# Patient Record
Sex: FEMALE | Race: BLACK OR AFRICAN AMERICAN | Employment: OTHER | URBAN - METROPOLITAN AREA
[De-identification: names, ages, dates, MRNs, and addresses within clinical notes are randomized per-mention and may not be internally consistent; named-entity substitution may affect disease eponyms.]

---

## 2017-03-08 ENCOUNTER — OFFICE VISIT (OUTPATIENT)
Dept: ENDOCRINOLOGY | Age: 67
End: 2017-03-08

## 2017-03-08 VITALS
HEART RATE: 58 BPM | BODY MASS INDEX: 33.46 KG/M2 | SYSTOLIC BLOOD PRESSURE: 135 MMHG | DIASTOLIC BLOOD PRESSURE: 74 MMHG | WEIGHT: 196 LBS | HEIGHT: 64 IN

## 2017-03-08 DIAGNOSIS — E83.52 HYPERCALCEMIA: ICD-10-CM

## 2017-03-08 DIAGNOSIS — E21.0 HYPERPARATHYROIDISM, PRIMARY (HCC): Primary | ICD-10-CM

## 2017-03-08 RX ORDER — CLONIDINE HYDROCHLORIDE 0.1 MG/1
TABLET ORAL 2 TIMES DAILY
COMMUNITY
End: 2022-02-04 | Stop reason: SDUPTHER

## 2017-03-08 RX ORDER — GUAIFENESIN 100 MG/5ML
81 LIQUID (ML) ORAL EVERY OTHER DAY
COMMUNITY

## 2017-03-08 RX ORDER — ENALAPRIL MALEATE 20 MG/1
20 TABLET ORAL DAILY
COMMUNITY
End: 2021-09-16 | Stop reason: SINTOL

## 2017-03-08 RX ORDER — GLIPIZIDE 5 MG/1
TABLET ORAL 2 TIMES DAILY
COMMUNITY
End: 2017-05-22

## 2017-03-08 RX ORDER — AMLODIPINE BESYLATE 10 MG/1
TABLET ORAL DAILY
COMMUNITY
End: 2022-03-30 | Stop reason: SDUPTHER

## 2017-03-08 RX ORDER — CHOLECALCIFEROL TAB 125 MCG (5000 UNIT) 125 MCG
TAB ORAL DAILY
COMMUNITY
End: 2021-02-15 | Stop reason: CLARIF

## 2017-03-08 NOTE — MR AVS SNAPSHOT
Visit Information Date & Time Provider Department Dept. Phone Encounter #  
 3/8/2017 11:10 AM Sara Ramos, [address] [place] [address] Diabetes and Endocrinology [phone] [id number] Follow-up Instructions Return in about 2 months (around 5/8/2017). Upcoming Health Maintenance Date Due Hepatitis C Screening 1950 DTaP/Tdap/Td series (1 - Tdap) 4/6/1971 BREAST CANCER SCRN MAMMOGRAM 4/6/2000 FOBT Q 1 YEAR AGE 50-75 4/6/2000 ZOSTER VACCINE AGE 60> 4/6/2010 GLAUCOMA SCREENING Q2Y 4/6/2015 OSTEOPOROSIS SCREENING (DEXA) 4/6/2015 Pneumococcal 65+ High/Highest Risk (1 of 2 - PCV13) 4/6/2015 MEDICARE YEARLY EXAM 4/6/2015 INFLUENZA AGE 9 TO ADULT 8/1/2016 Allergies as of 3/8/2017  Review Complete On: 3/8/2017 By: Sara Ramos MD  
 No Known Allergies Current Immunizations  Never Reviewed No immunizations on file. Not reviewed this visit You Were Diagnosed With   
  
 Codes Comments [facility] ([facility]    -  Primary ICD-10-CM: E21.0 ICD-9-CM: 252.01 Hypercalcemia     ICD-10-CM: R92.44 
ICD-9-CM: 275.42 Vitals BP Pulse Height(growth percentile) Weight(growth percentile) BMI Smoking Status 135/74 (BP 1 Location: Right arm, BP Patient Position: Sitting) (!) 58 5' 4\" (1.626 m) 196 lb (88.9 kg) 33.64 kg/m2 Never Smoker Vitals History BMI and BSA Data Body Mass Index Body Surface Area  
 33.64 kg/m 2 2 m 2 Preferred Pharmacy Pharmacy Name Phone [facility], [address] [facility] [phone] Your Updated Medication List  
  
   
This list is accurate as of: 3/8/17 12:02 PM.  Always use your most recent med list. amLODIPine 10 mg tablet Commonly known as:  Carmelita Gables Take  by mouth daily. aspirin 81 mg chewable tablet Take 81 mg by mouth daily. cholecalciferol (VITAMIN D3) 5,000 unit Tab tablet Commonly known as:  VITAMIN D3 Take  by mouth daily. cloNIDine HCl 0.1 mg tablet Commonly known as:  CATAPRES Take  by mouth two (2) times a day. enalapril 20 mg tablet Commonly known as:  Ricky Nissen Take 20 mg by mouth daily. glipiZIDE 5 mg tablet Commonly known as:  Janith Ket Take  by mouth two (2) times a day. metFORMIN 500 mg tablet Commonly known as:  GLUCOPHAGE Take 1,000 mg by mouth two (2) times daily (with meals). metoprolol tartrate 100 mg IR tablet Commonly known as:  LOPRESSOR Take  by mouth two (2) times a day. pravastatin 40 mg tablet Commonly known as:  PRAVACHOL Take 40 mg by mouth daily. We Performed the Following CALCIUM, RANDOM URINE [[id number] Custom] CREATININE, UR, RANDOM Q453814 CPT(R)] METABOLIC PANEL, COMPREHENSIVE [00374 CPT(R)] PTH INTACT [65911 CPT(R)] VITAMIN D, 25 HYDROXY [id number] CPT(R)] Follow-up Instructions Return in about 2 months (around 5/8/2017). To-Do List   
 03/08/2017 Imaging:  DEXA BONE DENSITY STUDY AXIAL Patient Instructions 24 HOUR URINE COLLECTION As part of your evaluation, we would like for you to collect a 24 hour sample of urine. This means that you will collect all of your urine into a collection jug for 24 hours. The first step is to visit the laboratory to receive a collection jug. Then select a day to start the urine collection. Keep in mind when selecting a day that you will need to return to the laboratory the following day to submit your urine sample. On the morning of the day you start collecting urine, the very first urine void should go into the toilet however everything else collected afterward should be collected into the jug, including any urine overnight, AND the first urine the following morning. After this you can stop collecting and bring the sample to the laboratory.  We will discuss the results with you along with any other labs you have completed. If you have other blood work to perform, you can complete that when you submit the urine sample to the lab. Just in case your lab tests may require it, return to the laboratory in the morning fasting. Plan to perform blood work on the morning that you return the urine container to the lab. Call with any concerns, I will discuss the results with you by phone, Yossi Baker [address] [facility] Diabetes & Endocrinology [address] [facility] Introducing [facility] SERVICES! Macario Veras introduces [employer] patient portal. Now you can access parts of your medical record, email your doctor's office, and request medication refills online. 1. In your internet browser, go to [email or web address] 2. Click on the First Time User? Click Here link in the Sign In box. You will see the New Member Sign Up page. 3. Enter your [employer] Access Code exactly as it appears below. You will not need to use this code after youve completed the sign-up process. If you do not sign up before the expiration date, you must request a new code. · [employer] Access Code: [email or web address] Expires: 6/6/2017 11:57 AM 
 
4. Enter the last four digits of your Social Security Number (xxxx) and Date of Birth (mm/dd/yyyy) as indicated and click Submit. You will be taken to the next sign-up page. 5. Create a [employer] ID. This will be your [employer] login ID and cannot be changed, so think of one that is secure and easy to remember. 6. Create a [employer] password. You can change your password at any time. 7. Enter your Password Reset Question and Answer. This can be used at a later time if you forget your password. 8. Enter your e-mail address. You will receive e-mail notification when new information is available in [address] 9. Click Sign Up. You can now view and download portions of your medical record.  
10. Click the Download Summary menu link to download a portable copy of your medical information. If you have questions, please visit the Frequently Asked Questions section of the [employer] website. Remember, [employer] is NOT to be used for urgent needs. For medical emergencies, dial 911. Now available from your iPhone and Android! Please provide this summary of care documentation to your next provider. Your primary care clinician is listed as Raina Miller. If you have any questions after today's visit, please call [phone].

## 2017-03-08 NOTE — PROGRESS NOTES
CONSULTATION REQUESTED BY: Mary Ellen Felder MD     REASON FOR CONSULT: hypercalcemia    CHIEF COMPLAINT: hypercalcemia    HISTORY OF PRESENT ILLNESS:   Nicholas Smart is a 77 y.o. female with a PMHx as noted below who was referred to our endocrinology clinic for evaluation of hypercalcemia. Patient is noted for evaluation of hypercalcemia with Dr. Ludivina Mckeon back in 2012. She was noted for very low vitamin D, level of 11.7, and a PTH level of 39. Furthermore she had been on HCTZ which had been stopped. Her serum calcium level was always very mild at that time, around 10.3-10.4. No history of kidney stones. Family history is not significant for hypercalcemia. Serum calcium on CMP obtained on 1/6/17 per outside labs shows a calcium level of 10.8. Unclear if a 24 hour urine calcium was previously obtained. No recent PTH level for review. Patient notes she has been feeling well in the past year, though some fatigue on exertion. She has been taking 5000 units of vitamin D 3 daily. PAST MEDICAL/SURGICAL HISTORY:   Past Medical History:   Diagnosis Date    Diabetes ([address]     HTN (hypertension)     Hyperlipemia      Past Surgical History:   Procedure Laterality Date    HX CHOLECYSTECTOMY         ALLERGIES:   No Known Allergies    MEDICATIONS ON ADMISSION:     Current Outpatient Prescriptions:     ergocalciferol (ERGOCALCIFEROL) 50,000 unit capsule, Take 1 Cap by mouth every fourteen (14) days. , Disp: 6 Cap, Rfl: 3    pravastatin (PRAVACHOL) 40 mg tablet, Take 40 mg by mouth daily. , Disp: , Rfl:     amLODIPine-benazepril (LOTREL) 5-10 mg per capsule, Take 1 Cap by mouth daily. , Disp: , Rfl:     metoprolol (LOPRESSOR) 100 mg tablet, Take  by mouth two (2) times a day., Disp: , Rfl:     metformin (GLUCOPHAGE) 500 mg tablet, Take 1,000 mg by mouth two (2) times daily (with meals). , Disp: , Rfl:     SOCIAL HISTORY:   Social History     Social History    Marital status:      Spouse name: N/A   Vinicio Wallace Number of children: N/A    Years of education: N/A     Occupational History    Not on file. Social History Main Topics    Smoking status: Never Smoker    Smokeless tobacco: Not on file    Alcohol use No    Drug use: No    Sexual activity: Not on file     Other Topics Concern    Not on file     Social History Narrative       FAMILY HISTORY:  Family History   Problem Relation Age of Onset    Diabetes Father     Kidney Disease Father     Diabetes Brother     Stroke Mother     Kidney Disease Brother     Other Brother      kidney stones       REVIEW OF SYSTEMS: Complete ROS assessed and noted for that which is described above, all else are negative. Eyes: normal  ENT: normal  CVS: normal  Resp: normal  GI: normal  : normal  GYN: normal  Endocrine: normal  Integument: normal  Musculoskeletal: normal  Neuro: normal  Psych: normal      PHYSICAL EXAMINATION:    VITAL SIGNS:  Visit Vitals    /76 (BP 1 Location: Left arm, BP Patient Position: Sitting)    Pulse 62    Ht 5' 4\" (1.626 m)    Wt 196 lb (88.9 kg)    BMI 33.64 kg/m2       GENERAL: NCAT, Sitting comfortably, NAD  EYES: EOMI, non-icteric, no proptosis  Ear/Nose/Throat: NCAT, no inflammation, no masses  LYMPH NODES: No LAD  CARDIOVASCULAR: S1 S2, RRR, No murmur, 2+ radial pulses  RESPIRATORY: CTA b/l, no wheeze/rales  GASTROINTESTINAL: NT, ND,  MUSCULOSKELETAL: Normal ROM, no atrophy  SKIN: warm, no edema/rash/ or other skin changes  NEUROLOGIC: 5/5 power all extremities, no tremors, AAOx3  PSYCHIATRIC: Normal affect, Normal insight and judgement         REVIEW OF LABORATORY AND RADIOLOGY DATA:   Labs and documentation have been reviewed as described above. ASSESSMENT AND PLAN:   Nichole Cowart is a 77 y.o. female with a PMHx as noted above who was referred to our endocrinology clinic for evaluation of hypercalcemia. Hypercalcemia, previously thought to be due to primary hyperparathyroidism.      Today we discussed the importance of f/u evaluation of bone health with progressive hypercalcemia. We discussed the need to reduce fracture risk and evaluate bone density. Furthermore we discussed the need for a lab refresh, with new baseline labs to guide decision making and therapeutic options. Plan:   Continue 5000 units of Vitamin D3 daily  Will check labs (blood and urine) and advise of potential calcium supplementation  Check PTH, CMP, 25 OH D, 24 hour urine calcium/creatiine  Denies prior DXA scan, will request a bone density eval.  * Patient would like to perform labs and imaging near home, I provided her with order slip to take with her. Discussed very simple and brief leg strengthening exercises to reduce risk of falls. Discussed screening her home for rugs and other obstructions that increase risk of falls. We will consider pursuing imaging of parathyroid glands only if she meets criteria for surgical intervention. To call with any concerns, I will advise her of results and treatment plan. Plan for 2 month f/u visit    Khloe HALL  [address] [facility] Diabetes & Endocrinology

## 2017-03-08 NOTE — PATIENT INSTRUCTIONS
24 HOUR URINE COLLECTION  As part of your evaluation, we would like for you to collect a 24 hour sample of urine. This means that you will collect all of your urine into a collection jug for 24 hours. The first step is to visit the laboratory to receive a collection jug. Then select a day to start the urine collection. Keep in mind when selecting a day that you will need to return to the laboratory the following day to submit your urine sample. On the morning of the day you start collecting urine, the very first urine void should go into the toilet however everything else collected afterward should be collected into the jug, including any urine overnight, AND the first urine the following morning. After this you can stop collecting and bring the sample to the laboratory. We will discuss the results with you along with any other labs you have completed. If you have other blood work to perform, you can complete that when you submit the urine sample to the lab. Just in case your lab tests may require it, return to the laboratory in the morning fasting. Plan to perform blood work on the morning that you return the urine container to the lab. Call with any concerns, I will discuss the results with you by phone,    Andrew Tran.  [address] Endocrinology  [address] [place] [address]

## 2017-03-15 ENCOUNTER — TELEPHONE (OUTPATIENT)
Dept: ENDOCRINOLOGY | Age: 67
End: 2017-03-15

## 2017-03-15 LAB
25(OH)D3+25(OH)D2 SERPL-MCNC: 32.9 NG/ML (ref 30–100)
ALBUMIN SERPL-MCNC: 3.9 G/DL (ref 3.6–4.8)
ALBUMIN/GLOB SERPL: 1.5 {RATIO} (ref 1.2–2.2)
ALP SERPL-CCNC: 68 IU/L (ref 39–117)
ALT SERPL-CCNC: 6 IU/L (ref 0–32)
AST SERPL-CCNC: 12 IU/L (ref 0–40)
BILIRUB SERPL-MCNC: 0.2 MG/DL (ref 0–1.2)
BUN SERPL-MCNC: 10 MG/DL (ref 8–27)
BUN/CREAT SERPL: 16 (ref 11–26)
CALCIUM 24H UR-MCNC: 14.4 MG/DL
CALCIUM 24H UR-MRATE: 144 MG/24 HR (ref 100–300)
CALCIUM SERPL-MCNC: 10.6 MG/DL (ref 8.7–10.3)
CHLORIDE SERPL-SCNC: 105 MMOL/L (ref 96–106)
CO2 SERPL-SCNC: 28 MMOL/L (ref 18–29)
CREAT 24H UR-MRATE: 687 MG/24 HR (ref 800–1800)
CREAT SERPL-MCNC: 0.64 MG/DL (ref 0.57–1)
CREAT UR-MCNC: 68.7 MG/DL
GLOBULIN SER CALC-MCNC: 2.6 G/DL (ref 1.5–4.5)
GLUCOSE SERPL-MCNC: 88 MG/DL (ref 65–99)
POTASSIUM SERPL-SCNC: 3.9 MMOL/L (ref 3.5–5.2)
PROT SERPL-MCNC: 6.5 G/DL (ref 6–8.5)
PTH-INTACT SERPL-MCNC: 30 PG/ML (ref 15–65)
SODIUM SERPL-SCNC: 146 MMOL/L (ref 134–144)

## 2017-03-15 NOTE — TELEPHONE ENCOUNTER
----- Message from Paty Vasquez MD sent at 3/15/2017  2:01 PM EDT -----  Labs and Bone density scan were reviewed:  Bone Density Scan dated 3/14/17 ([facility]):     Femor Neck T=  -1.1   Lumbar Spine T=  -0.2      Indication was for hyperparathyroidism but forarm T-score does not appear to have been performed. Serum calcium is mildly elevated at 10.6 (8.7-10. 3)  Vitamin D level is normal at 32.9  GFR normal >90  PTH 30, not overwhelmingly high but not suppressed either  24 hour urine calcium is normal at 144mg    Recommend she continue on her vitamin D3 of 5000 units  Recommend she start 500mg of calcium carbonate with breakfast and dinner  Because it is very mild and without symptoms, just mild borderline osteopenia, we can monitor on this therapy for now. Celine Rendon. 39 [employer] Endocrinology  [employer]    -----------------------------------  3/15/2017 2:12pm    I attempted to call Carrington Bragg, no answer and no voice mail on home phone. I then called mobile number and reached Carrington Bragg but she couldn't talk and asked me to call her back on her home number around 4pm. I told her i would do that for her. Jose Valencia   ----------------------------------------------    Addendum: 3/15/2017, 4:54 PM    Spoke with Amanda Mancuso by phone and relayed the above message. I told her and also her  Porter Tobin. They will  the Calcium and get started as instructed.     Florentino Romero

## 2017-03-15 NOTE — PROGRESS NOTES
Labs and Bone density scan were reviewed:  Bone Density Scan dated 3/14/17 ([facility]):     Femor Neck T=  -1.1   Lumbar Spine T=  -0.2      Indication was for hyperparathyroidism but forarm T-score does not appear to have been performed. Serum calcium is mildly elevated at 10.6 (8.7-10. 3)  Vitamin D level is normal at 32.9  GFR normal >90  PTH 30, not overwhelmingly high but not suppressed either  24 hour urine calcium is normal at 144mg    Recommend she continue on her vitamin D3 of 5000 units  Recommend she start 500mg of calcium carbonate with breakfast and dinner  Because it is very mild and without symptoms, just mild borderline osteopenia, we can monitor on this therapy for now. Maren Medrano.  [address] [facility] Endocrinology  [address] [place] [address]

## 2017-05-22 ENCOUNTER — OFFICE VISIT (OUTPATIENT)
Dept: ENDOCRINOLOGY | Age: 67
End: 2017-05-22

## 2017-05-22 VITALS
HEART RATE: 62 BPM | WEIGHT: 192.6 LBS | HEIGHT: 64 IN | DIASTOLIC BLOOD PRESSURE: 69 MMHG | BODY MASS INDEX: 32.88 KG/M2 | SYSTOLIC BLOOD PRESSURE: 120 MMHG

## 2017-05-22 DIAGNOSIS — E83.52 HYPERCALCEMIA: Primary | ICD-10-CM

## 2017-05-22 DIAGNOSIS — E21.0 HYPERPARATHYROIDISM, PRIMARY (HCC): ICD-10-CM

## 2017-05-22 RX ORDER — METFORMIN HYDROCHLORIDE 1000 MG/1
TABLET ORAL 2 TIMES DAILY
COMMUNITY
Start: 2017-03-29 | End: 2021-10-27 | Stop reason: SDUPTHER

## 2017-05-22 RX ORDER — GLIPIZIDE 5 MG/1
TABLET, FILM COATED, EXTENDED RELEASE ORAL DAILY
COMMUNITY
Start: 2017-04-13 | End: 2022-02-04 | Stop reason: SDUPTHER

## 2017-05-22 RX ORDER — LATANOPROST 50 UG/ML
1 SOLUTION/ DROPS OPHTHALMIC
COMMUNITY
Start: 2017-03-29

## 2017-05-22 NOTE — MR AVS SNAPSHOT
Visit Information Date & Time Provider Department Dept. Phone Encounter #  
 5/22/2017 10:50 AM Flora Whitehead, [facility] Diabetes and Endocrinology [id number] Follow-up Instructions Return in about 6 months (around 11/22/2017). Upcoming Health Maintenance Date Due Hepatitis C Screening 1950 DTaP/Tdap/Td series (1 - Tdap) 4/6/1971 BREAST CANCER SCRN MAMMOGRAM 4/6/2000 FOBT Q 1 YEAR AGE 50-75 4/6/2000 ZOSTER VACCINE AGE 60> 4/6/2010 GLAUCOMA SCREENING Q2Y 4/6/2015 Pneumococcal 65+ Low/Medium Risk (1 of 2 - PCV13) 4/6/2015 MEDICARE YEARLY EXAM 4/6/2015 INFLUENZA AGE 9 TO ADULT 8/1/2017 Allergies as of 5/22/2017  Review Complete On: 5/22/2017 By: Jorgito Kennedy LPN No Known Allergies Current Immunizations  Never Reviewed No immunizations on file. Not reviewed this visit You Were Diagnosed With   
  
 Codes Comments Hypercalcemia    -  Primary ICD-10-CM: E35.37 
ICD-9-CM: 275.42 Hyperparathyroidism, primary ([address]     ICD-10-CM: E21.0 ICD-9-CM: 252.01 Vitals BP Pulse Height(growth percentile) Weight(growth percentile) BMI Smoking Status 120/69 62 5' 4\" (1.626 m) 192 lb 9.6 oz (87.4 kg) 33.06 kg/m2 Never Smoker Vitals History BMI and BSA Data Body Mass Index Body Surface Area 33.06 kg/m 2 1.99 m 2 Preferred Pharmacy Pharmacy Name Phone [facility] 2002 [facility], [address] [place] [address] [phone] Your Updated Medication List  
  
   
This list is accurate as of: 5/22/17 10:54 AM.  Always use your most recent med list. amLODIPine 10 mg tablet Commonly known as:  Yarely Half Take  by mouth daily. aspirin 81 mg chewable tablet Take 81 mg by mouth daily. cholecalciferol (VITAMIN D3) 5,000 unit Tab tablet Commonly known as:  VITAMIN D3 Take  by mouth daily. cloNIDine HCl 0.1 mg tablet Commonly known as:  CATAPRES Take  by mouth two (2) times a day. enalapril 20 mg tablet Commonly known as:  Enrike Grzegorz Take 20 mg by mouth daily. * glipiZIDE 5 mg tablet Commonly known as:  Dimple Coral Take  by mouth two (2) times a day. * glipiZIDE SR 5 mg CR tablet Commonly known as:  GLUCOTROL XL  
daily. latanoprost 0.005 % ophthalmic solution Commonly known as:  XALATAN  
  
 * metFORMIN 500 mg tablet Commonly known as:  GLUCOPHAGE Take 1,000 mg by mouth two (2) times daily (with meals). * metFORMIN 1,000 mg tablet Commonly known as:  GLUCOPHAGE  
two (2) times a day. metoprolol tartrate 100 mg IR tablet Commonly known as:  LOPRESSOR Take  by mouth two (2) times a day. OTHER  
daily. Calcium citrate + D ( Vit D3 500 units and Calcium 630  
  
 pravastatin 40 mg tablet Commonly known as:  PRAVACHOL Take 40 mg by mouth daily. * Notice: This list has 4 medication(s) that are the same as other medications prescribed for you. Read the directions carefully, and ask your doctor or other care provider to review them with you. We Performed the Following METABOLIC PANEL, COMPREHENSIVE [26737 CPT(R)] PTH INTACT [47150 CPT(R)] Follow-up Instructions Return in about 6 months (around 11/22/2017). Patient Instructions Continue with calcium and vitamin D daily Blood work today, Will discuss any changes by phone, Sergio Moody. [address] [facility] Diabetes & Endocrinology 508 [facility] SERVICES! [facility] introduces [employer] patient portal. Now you can access parts of your medical record, email your doctor's office, and request medication refills online. 1. In your internet browser, go to [email or web address] 2. Click on the First Time User? Click Here link in the Sign In box. You will see the New Member Sign Up page. 3. Enter your [employer] Access Code exactly as it appears below. You will not need to use this code after youve completed the sign-up process. If you do not sign up before the expiration date, you must request a new code. · [employer] Access Code: [email or web address] Expires: 6/6/2017 12:57 PM 
 
4. Enter the last four digits of your Social Security Number (xxxx) and Date of Birth (mm/dd/yyyy) as indicated and click Submit. You will be taken to the next sign-up page. 5. Create a [employer] ID. This will be your [employer] login ID and cannot be changed, so think of one that is secure and easy to remember. 6. Create a [employer] password. You can change your password at any time. 7. Enter your Password Reset Question and Answer. This can be used at a later time if you forget your password. 8. Enter your e-mail address. You will receive e-mail notification when new information is available in [address] 9. Click Sign Up. You can now view and download portions of your medical record. 10. Click the Download Summary menu link to download a portable copy of your medical information. If you have questions, please visit the Frequently Asked Questions section of the [employer] website. Remember, [employer] is NOT to be used for urgent needs. For medical emergencies, dial 911. Now available from your iPhone and Android! Please provide this summary of care documentation to your next provider. Your primary care clinician is listed as Shane Campoverde. If you have any questions after today's visit, please call [phone].

## 2017-05-22 NOTE — PROGRESS NOTES
CHIEF COMPLAINT: primary hyperparathyroidism, asymptomatic     HISTORY OF PRESENT ILLNESS:   Bam Magaña is a 79 y.o. female with a PMHx as noted below who presents for f/u evaluation of hypercalcemia. Initial History:  Patient is noted for evaluation of hypercalcemia with Dr. Milagros Padron back in 2012. She was noted for very low vitamin D, level of 11.7, and a PTH level of 39. Furthermore she had been on HCTZ which had been stopped. Her serum calcium level was always very mild at that time, around 10.3-10.4. No history of kidney stones. Family history is not significant for hypercalcemia. Interval History:  Patient had completed a DXA with T= - 1.1 at the FN, forarm not completed  Labs obtained revealed serum calcium 10.6, D 32.9, PTH 30, GFR >90, 24 hr urine calcium 144  She was continued on D3 5000 U daily and started on calcium carb 500mg BID (taking one calc citrate 650 daily)  We planned to proceed with conservative medical management considering mild asymptomatic status. She is otherwise feeling well, has no complaints today. She has not had any fractures in the past.       PAST MEDICAL/SURGICAL HISTORY:   Past Medical History:   Diagnosis Date    Diabetes ([facility] [address]     HTN (hypertension)     Hyperlipemia      Past Surgical History:   Procedure Laterality Date    HX CHOLECYSTECTOMY         ALLERGIES:   No Known Allergies    MEDICATIONS ON ADMISSION:     Current Outpatient Prescriptions:     glipiZIDE SR (GLUCOTROL XL) 5 mg CR tablet, daily. , Disp: , Rfl:     metFORMIN (GLUCOPHAGE) 1,000 mg tablet, two (2) times a day., Disp: , Rfl:     latanoprost (XALATAN) 0.005 % ophthalmic solution, , Disp: , Rfl:     OTHER, daily. Calcium citrate + D ( Vit D3 500 units and Calcium 630, Disp: , Rfl:     aspirin 81 mg chewable tablet, Take 81 mg by mouth daily. , Disp: , Rfl:     enalapril (VASOTEC) 20 mg tablet, Take 20 mg by mouth daily. , Disp: , Rfl:     amLODIPine (NORVASC) 10 mg tablet, Take  by mouth daily. , Disp: , Rfl:     cholecalciferol, VITAMIN D3, (VITAMIN D3) 5,000 unit tab tablet, Take  by mouth daily. , Disp: , Rfl:     cloNIDine HCl (CATAPRES) 0.1 mg tablet, Take  by mouth two (2) times a day., Disp: , Rfl:     pravastatin (PRAVACHOL) 40 mg tablet, Take 40 mg by mouth daily. , Disp: , Rfl:     metoprolol (LOPRESSOR) 100 mg tablet, Take  by mouth two (2) times a day., Disp: , Rfl:     glipiZIDE (GLUCOTROL) 5 mg tablet, Take  by mouth two (2) times a day., Disp: , Rfl:     metformin (GLUCOPHAGE) 500 mg tablet, Take 1,000 mg by mouth two (2) times daily (with meals). , Disp: , Rfl:     SOCIAL HISTORY:   Social History     Social History    Marital status:      Spouse name: N/A    Number of children: N/A    Years of education: N/A     Occupational History    Not on file. Social History Main Topics    Smoking status: Never Smoker    Smokeless tobacco: Not on file    Alcohol use No    Drug use: No    Sexual activity: Not on file     Other Topics Concern    Not on file     Social History Narrative       FAMILY HISTORY:  Family History   Problem Relation Age of Onset    Diabetes Father     Kidney Disease Father     Diabetes Brother     Stroke Mother     Kidney Disease Brother     Other Brother      kidney stones       REVIEW OF SYSTEMS: Complete ROS assessed and noted for that which is described above, all else are negative.   Eyes: normal  ENT: normal  CVS: normal  Resp: normal  GI: normal  : normal  GYN: normal  Endocrine: normal  Integument: normal  Musculoskeletal: normal  Neuro: normal  Psych: normal      PHYSICAL EXAMINATION:    VITAL SIGNS:  Visit Vitals    /69    Pulse 62    Ht 5' 4\" (1.626 m)    Wt 192 lb 9.6 oz (87.4 kg)    BMI 33.06 kg/m2       GENERAL: NCAT, Sitting comfortably, NAD  EYES: EOMI, non-icteric, no proptosis  Ear/Nose/Throat: NCAT, no inflammation, no masses  LYMPH NODES: No LAD  CARDIOVASCULAR: S1 S2, RRR, No murmur, 2+ radial pulses  RESPIRATORY: CTA b/l, no wheeze/rales  GASTROINTESTINAL: NT, ND,  MUSCULOSKELETAL: Normal ROM, no atrophy  SKIN: warm, no edema/rash/ or other skin changes  NEUROLOGIC: 5/5 power all extremities, no tremors, AAOx3  PSYCHIATRIC: Normal affect, Normal insight and judgement         REVIEW OF LABORATORY AND RADIOLOGY DATA:   Labs and documentation have been reviewed as described above. ASSESSMENT AND PLAN:   Yuniel Acosta is a 79 y.o. female with a PMHx as noted above who presents for f/u evaluation of hypercalcemia. Primary hyperparathyroidism, mild asymptomatic    Conservative management at this time with calcium and vitamin D are appropriate. Serum calcium has always been mild, she is asymptomatic, renal function is normal, DXA with only borderline osteopenia at T= -1.1, and urine calcium is stable. Plan:   Continue 5000 units of Vitamin D3 daily  Calcium citrate 650mg daily with a meal ok  PTH and CMP today,  Hydration is important, discussed. Plan for 6 month f/u visit    Roderick HALL  [address] [facility] Diabetes & Endocrinology

## 2017-05-22 NOTE — PATIENT INSTRUCTIONS
Continue with calcium and vitamin D daily  Blood work today,  Will discuss any changes by phone,    Raymond Valenzuela.  39 [facility] Endocrinology  8 [facility]

## 2017-05-23 LAB
ALBUMIN SERPL-MCNC: 4 G/DL (ref 3.6–4.8)
ALBUMIN/GLOB SERPL: 1.7 {RATIO} (ref 1.2–2.2)
ALP SERPL-CCNC: 62 IU/L (ref 39–117)
ALT SERPL-CCNC: 12 IU/L (ref 0–32)
AST SERPL-CCNC: 18 IU/L (ref 0–40)
BILIRUB SERPL-MCNC: <0.2 MG/DL (ref 0–1.2)
BUN SERPL-MCNC: 16 MG/DL (ref 8–27)
BUN/CREAT SERPL: 22 (ref 12–28)
CALCIUM SERPL-MCNC: 11 MG/DL (ref 8.7–10.3)
CHLORIDE SERPL-SCNC: 106 MMOL/L (ref 96–106)
CO2 SERPL-SCNC: 27 MMOL/L (ref 18–29)
CREAT SERPL-MCNC: 0.74 MG/DL (ref 0.57–1)
GLOBULIN SER CALC-MCNC: 2.4 G/DL (ref 1.5–4.5)
GLUCOSE SERPL-MCNC: 110 MG/DL (ref 65–99)
POTASSIUM SERPL-SCNC: 4.2 MMOL/L (ref 3.5–5.2)
PROT SERPL-MCNC: 6.4 G/DL (ref 6–8.5)
PTH-INTACT SERPL-MCNC: 29 PG/ML (ref 15–65)
SODIUM SERPL-SCNC: 148 MMOL/L (ref 134–144)

## 2017-05-24 ENCOUNTER — TELEPHONE (OUTPATIENT)
Dept: ENDOCRINOLOGY | Age: 67
End: 2017-05-24

## 2017-05-24 NOTE — PROGRESS NOTES
Patients blood work is stable,   Because her urine calcium was stable, and vitamin D level was improved, along with stable bone density,   She can hold off on the calcium citrate that she is taking currently, we can decide later if she needs to take anymore extra calcium,  (PTH 29, Serum Calcium 11)    Thanks,  Darnell HALL  39 [facility] Endocrinology  [address] [place] [address]

## 2017-05-24 NOTE — TELEPHONE ENCOUNTER
----- Message from Monalisa Winston MD sent at 5/24/2017 10:27 AM EDT -----  Patients blood work is stable,   Because her urine calcium was stable, and vitamin D level was improved, along with stable bone density,   She can hold off on the calcium citrate that she is taking currently, we can decide later if she needs to take anymore extra calcium,  (PTH 29, Serum Calcium 11)    Thanks,  Fortunato Barrientos TCherry [address] [place] [address] Endocrinology  [address] [place] [address]    ------------------------------------------------------------------------    Addendum: 5/24/2017, 12:39 PM    Spoke with Taylor Cousins by phone, and relayed the above message. She and her  both understood the information.       Moshe Oneal

## 2017-08-24 ENCOUNTER — TELEPHONE (OUTPATIENT)
Dept: ENDOCRINOLOGY | Age: 67
End: 2017-08-24

## 2017-08-24 DIAGNOSIS — E83.52 HYPERCALCEMIA: Primary | ICD-10-CM

## 2017-08-24 NOTE — TELEPHONE ENCOUNTER
Received outside serum calcium level of 10.9 which is only 0.7mg/dl above the normal range. Patient to stay hydrated. It would be a good idea at this time to check just a few more labs to make sure her calcium is not elevated from other causes while we are monitoring. I will order those labs now and she can have them completed at her convenience at [facility]. If she likes, we can print the requisition form (making sure all labs are included on that printed copy) and mail/fax to her or her local lab to have her repeat them closer to home. We can update her on those result at that time. In the meantime proper hydration is important. Thanks,    Marilin Molina.  39 [facility] Endocrinology  [address] [place] [address]

## 2017-08-24 NOTE — TELEPHONE ENCOUNTER
I attempted to call Nathan Bynum but was told she was not home. I left a message saying I would try her again tomorrow.   Lianne Hedrick

## 2017-08-25 NOTE — TELEPHONE ENCOUNTER
I called Yossi Read back and relayed Dr. Sammi Cam message. She understood the information and will have that lab work done.   Kayla Vázquez

## 2017-09-01 LAB
1,25(OH)2D3 SERPL-MCNC: 45.9 PG/ML (ref 19.9–79.3)
25(OH)D3+25(OH)D2 SERPL-MCNC: 67.1 NG/ML (ref 30–100)
PTH RELATED PROT SERPL-SCNC: <1.1 PMOL/L
TSH SERPL DL<=0.005 MIU/L-ACNC: 2.47 UIU/ML (ref 0.45–4.5)
VIT A SERPL-MCNC: 65 UG/DL (ref 26–82)

## 2017-09-05 NOTE — TELEPHONE ENCOUNTER
PTHrp, 25 OH & 1,25 OH D, TSH, and Vit A levels are all normal.   No clear causes for any potential non-PTH mediated hypercalcemia. As calcium level is mild, we will continue to monitor,  I called patient and discussed this with her,    Nancy Ferreira.  39 [facility] Endocrinology  [address] [place] [address]

## 2017-11-22 ENCOUNTER — OFFICE VISIT (OUTPATIENT)
Dept: ENDOCRINOLOGY | Age: 67
End: 2017-11-22

## 2017-11-22 VITALS
SYSTOLIC BLOOD PRESSURE: 169 MMHG | WEIGHT: 195.3 LBS | BODY MASS INDEX: 33.34 KG/M2 | HEART RATE: 60 BPM | HEIGHT: 64 IN | DIASTOLIC BLOOD PRESSURE: 83 MMHG

## 2017-11-22 DIAGNOSIS — E21.0 HYPERPARATHYROIDISM, PRIMARY (HCC): ICD-10-CM

## 2017-11-22 DIAGNOSIS — E83.52 HYPERCALCEMIA: Primary | ICD-10-CM

## 2017-11-22 NOTE — PATIENT INSTRUCTIONS
Blood work at the lab at [facility],    Will see you back in 4 months,    Debra CHANG.  [address] Endocrinology  [address] [place] [address]

## 2017-11-22 NOTE — MR AVS SNAPSHOT
Visit Information Date & Time Provider Department Dept. Phone Encounter #  
 11/22/2017 10:50 AM Ever Fernandez, [address] [place] [address] Diabetes and Endocrinology [phone] [id number] Follow-up Instructions Return in about 4 months (around 3/22/2018). Upcoming Health Maintenance Date Due Hepatitis C Screening 1950 DTaP/Tdap/Td series (1 - Tdap) 4/6/1971 BREAST CANCER SCRN MAMMOGRAM 4/6/2000 FOBT Q 1 YEAR AGE 50-75 4/6/2000 ZOSTER VACCINE AGE 60> 2/6/2010 GLAUCOMA SCREENING Q2Y 4/6/2015 Pneumococcal 65+ Low/Medium Risk (1 of 2 - PCV13) 4/6/2015 MEDICARE YEARLY EXAM 4/6/2015 Influenza Age 5 to Adult 8/1/2017 Allergies as of 11/22/2017  Review Complete On: 11/22/2017 By: Ever Fernandez MD  
 No Known Allergies Current Immunizations  Never Reviewed No immunizations on file. Not reviewed this visit You Were Diagnosed With   
  
 Codes Comments Hypercalcemia    -  Primary ICD-10-CM: O36.35 
ICD-9-CM: 275.42 Hyperparathyroidism, primary ([facility] [address]     ICD-10-CM: E21.0 ICD-9-CM: 252.01 Vitals BP Pulse Height(growth percentile) Weight(growth percentile) BMI Smoking Status 169/83 (BP 1 Location: Right arm, BP Patient Position: Sitting) 60 5' 4\" (1.626 m) 195 lb 4.8 oz (88.6 kg) 33.52 kg/m2 Never Smoker Vitals History BMI and BSA Data Body Mass Index Body Surface Area  
 33.52 kg/m 2 2 m 2 Preferred Pharmacy Pharmacy Name Phone [facility] [address] [facility], [facility] [address] [facility] [phone] Your Updated Medication List  
  
   
This list is accurate as of: 11/22/17 11:58 AM.  Always use your most recent med list. amLODIPine 10 mg tablet Commonly known as:  Julisa Spruce Take  by mouth daily. aspirin 81 mg chewable tablet Take 81 mg by mouth daily. cholecalciferol (VITAMIN D3) 5,000 unit Tab tablet Commonly known as:  VITAMIN D3  
 Take  by mouth daily. cloNIDine HCl 0.1 mg tablet Commonly known as:  CATAPRES Take  by mouth two (2) times a day. enalapril 20 mg tablet Commonly known as:  Esta Honer Take 20 mg by mouth daily. glipiZIDE SR 5 mg CR tablet Commonly known as:  GLUCOTROL XL  
daily. latanoprost 0.005 % ophthalmic solution Commonly known as:  XALATAN  
  
 metFORMIN 1,000 mg tablet Commonly known as:  GLUCOPHAGE  
two (2) times a day. metoprolol tartrate 100 mg IR tablet Commonly known as:  LOPRESSOR Take  by mouth two (2) times a day. OTHER  
daily. Calcium citrate + D ( Vit D3 500 units and Calcium 630  
  
 pravastatin 40 mg tablet Commonly known as:  PRAVACHOL Take 40 mg by mouth daily. We Performed the Following FREE LIGHT CHAINS, KAPPA/LAMBDA, QT [38868 CPT(R)] METABOLIC PANEL, COMPREHENSIVE [94899 CPT(R)] PTH INTACT [89407 CPT(R)] Follow-up Instructions Return in about 4 months (around 3/22/2018). Patient Instructions Blood work at the lab at [facility], Will see you back in 4 months, 
 
Hazel HALL 5[address] [facility] Diabetes & Endocrinology 8 [facility] Introducing [facility]! [employer] introduces [employer] patient portal. Now you can access parts of your medical record, email your doctor's office, and request medication refills online. 1. In your internet browser, go to [email or web address] 2. Click on the First Time User? Click Here link in the Sign In box. You will see the New Member Sign Up page. 3. Enter your [employer] Access Code exactly as it appears below. You will not need to use this code after youve completed the sign-up process. If you do not sign up before the expiration date, you must request a new code. · [employer] Access Code: [email or web address] Expires: 2/20/2018 11:58 AM 
 
4.  Enter the last four digits of your Social Security Number (xxxx) and Date of Birth (mm/dd/yyyy) as indicated and click Submit. You will be taken to the next sign-up page. 5. Create a [employer] ID. This will be your [employer] login ID and cannot be changed, so think of one that is secure and easy to remember. 6. Create a [employer] password. You can change your password at any time. 7. Enter your Password Reset Question and Answer. This can be used at a later time if you forget your password. 8. Enter your e-mail address. You will receive e-mail notification when new information is available in [address] 9. Click Sign Up. You can now view and download portions of your medical record. 10. Click the Download Summary menu link to download a portable copy of your medical information. If you have questions, please visit the Frequently Asked Questions section of the [employer] website. Remember, [employer] is NOT to be used for urgent needs. For medical emergencies, dial 911. Now available from your iPhone and Android! Please provide this summary of care documentation to your next provider. Your primary care clinician is listed as Jaime Carlisle. If you have any questions after today's visit, please call [phone].

## 2017-11-22 NOTE — PROGRESS NOTES
CHIEF COMPLAINT: f/u suspected primary hyperparathyroidism, asymptomatic     HISTORY OF PRESENT ILLNESS:   Sandy Valadez is a 79 y.o. female with a PMHx as noted below who presents for f/u evaluation of hypercalcemia. Initial History:  Patient is noted for evaluation of hypercalcemia with Dr. Roshni Alexander back in 2012. She was noted for very low vitamin D, level of 11.7, and a PTH level of 39. Furthermore she had been on HCTZ which had been stopped. Her serum calcium level was always very mild at that time, around 10.3-10.4. No history of kidney stones. Family history is not significant for hypercalcemia. Patient had completed a DXA with T= - 1.1 at the FN, forarm not completed    Interval History:  24 hr urine calcium was 144  PTHrp, 25 OH & 1,25 OH D, TSH, and Vit A levels are all normal.   No clear causes for any potential non-PTH mediated hypercalcemia. As calcium level is mild and urine calcium normal, we decided to continue monitoring concervatively. She continues on D3 5000 U daily (level was 32) and calc citrate 650 daily  No recent labs for review,  Patient reports feeling well        PAST MEDICAL/SURGICAL HISTORY:   Past Medical History:   Diagnosis Date    Diabetes ([address]     HTN (hypertension)     Hyperlipemia      Past Surgical History:   Procedure Laterality Date    HX CHOLECYSTECTOMY         ALLERGIES:   No Known Allergies    MEDICATIONS ON ADMISSION:     Current Outpatient Prescriptions:     glipiZIDE SR (GLUCOTROL XL) 5 mg CR tablet, daily. , Disp: , Rfl:     metFORMIN (GLUCOPHAGE) 1,000 mg tablet, two (2) times a day., Disp: , Rfl:     latanoprost (XALATAN) 0.005 % ophthalmic solution, , Disp: , Rfl:     aspirin 81 mg chewable tablet, Take 81 mg by mouth daily. , Disp: , Rfl:     enalapril (VASOTEC) 20 mg tablet, Take 20 mg by mouth daily. , Disp: , Rfl:     amLODIPine (NORVASC) 10 mg tablet, Take  by mouth daily. , Disp: , Rfl:     cholecalciferol, VITAMIN D3, (VITAMIN D3) 5,000 skin changes  NEUROLOGIC: 5/5 power all extremities, no tremors, AAOx3  PSYCHIATRIC: Normal affect, Normal insight and judgement         REVIEW OF LABORATORY AND RADIOLOGY DATA:   Labs and documentation have been reviewed as described above. ASSESSMENT AND PLAN:   Nicholas Smart is a 79 y.o. female with a PMHx as noted above who presents for f/u evaluation of hypercalcemia. Hypercalcemia  Suspected Primary hyperparathyroidism, mild asymptomatic    Patient had no apparent cause of her elevated calcium level, and the only appreciable finding which was not too impressive was that the PTH level was not completely suppressed with the elevated calcium. The rest of the work up had been unrevealing. We decided to continue with conservative monitoring since her bone density was stable, her urine calcium was normal and not elevated, and she was feeling just fine. We will re-evaluate with some labs today, watching for any increasing trends in her PTH level, but will also check her serum free light chains on this lab set. Plan:   Continue 5000 units of Vitamin D3 daily  Can continue off calcium (low-normal PTH, normal urine calcium)  PTH, CMP, and serum free light chains. Will complete close to home,  Hydration with water around 2 glasses twice per day ok,    Plan for 4 month f/u visit, welcomed to call with any concerns,    Juan Mckeon.  [address] [facility] Diabetes & Endocrinology

## 2017-11-27 LAB
ALBUMIN SERPL-MCNC: 4.3 G/DL (ref 3.6–4.8)
ALBUMIN/GLOB SERPL: 1.6 {RATIO} (ref 1.2–2.2)
ALP SERPL-CCNC: 64 IU/L (ref 39–117)
ALT SERPL-CCNC: 9 IU/L (ref 0–32)
AST SERPL-CCNC: 15 IU/L (ref 0–40)
BILIRUB SERPL-MCNC: 0.3 MG/DL (ref 0–1.2)
BUN SERPL-MCNC: 17 MG/DL (ref 8–27)
BUN/CREAT SERPL: 23 (ref 12–28)
CALCIUM SERPL-MCNC: 11.2 MG/DL (ref 8.7–10.3)
CHLORIDE SERPL-SCNC: 100 MMOL/L (ref 96–106)
CO2 SERPL-SCNC: 30 MMOL/L (ref 18–29)
CREAT SERPL-MCNC: 0.75 MG/DL (ref 0.57–1)
GFR SERPLBLD CREATININE-BSD FMLA CKD-EPI: 83 ML/MIN/1.73
GFR SERPLBLD CREATININE-BSD FMLA CKD-EPI: 95 ML/MIN/1.73
GLOBULIN SER CALC-MCNC: 2.7 G/DL (ref 1.5–4.5)
GLUCOSE SERPL-MCNC: 130 MG/DL (ref 65–99)
KAPPA LC FREE SER-MCNC: 15.9 MG/L (ref 3.3–19.4)
KAPPA LC FREE/LAMBDA FREE SER: 1.12 {RATIO} (ref 0.26–1.65)
LAMBDA LC FREE SERPL-MCNC: 14.2 MG/L (ref 5.7–26.3)
POTASSIUM SERPL-SCNC: 3.9 MMOL/L (ref 3.5–5.2)
PROT SERPL-MCNC: 7 G/DL (ref 6–8.5)
PTH-INTACT SERPL-MCNC: 30 PG/ML (ref 15–65)
SODIUM SERPL-SCNC: 144 MMOL/L (ref 134–144)

## 2017-11-29 NOTE — PROGRESS NOTES
PTH level is low normal. It has generally not increased and has been stable over the past year. That is reassuring. Her calcium level is only mildly increased at 11.2, previously 11. We expect small fluctuations, but if it continues to increase we may consider pursuing further evaluation with imaging, and consider surgical evaluation. For now, hydration, and management as we had discussed is just fine. All other testing was normal, no evidence of multiple myeloma. She can call us if she has any concerns,    Kavon Pyel.  39 [facility] Endocrinology  [address] [place] [address]

## 2017-11-30 ENCOUNTER — TELEPHONE (OUTPATIENT)
Dept: ENDOCRINOLOGY | Age: 67
End: 2017-11-30

## 2017-11-30 NOTE — TELEPHONE ENCOUNTER
I attempted to call Sandoval Carney and there was no answer and no machine at the [phone] number. I also attempted the mobile number of [phone] and reached a recording that said the mail box was full. I will keep trying to reach her.   Juan R Mcduffie

## 2017-11-30 NOTE — TELEPHONE ENCOUNTER
----- Message from Sudheer Seth MD sent at 11/29/2017  5:02 PM EST -----  PTH level is low normal. It has generally not increased and has been stable over the past year. That is reassuring. Her calcium level is only mildly increased at 11.2, previously 11. We expect small fluctuations, but if it continues to increase we may consider pursuing further evaluation with imaging, and consider surgical evaluation. For now, hydration, and management as we had discussed is just fine. All other testing was normal, no evidence of multiple myeloma. She can call us if she has any concerns,    Denae Ontiveros.  39 [facility] Endocrinology  [address] [place] [address]

## 2017-12-08 NOTE — TELEPHONE ENCOUNTER
I reached Nupur Mac on her cell phone and relayed the message from Dr. James Cavanaugh. She understood the information and will do as instructed.   Fransisco Giles

## 2018-03-08 ENCOUNTER — OFFICE VISIT (OUTPATIENT)
Dept: ENDOCRINOLOGY | Age: 68
End: 2018-03-08

## 2018-03-08 VITALS
WEIGHT: 197.7 LBS | BODY MASS INDEX: 33.75 KG/M2 | DIASTOLIC BLOOD PRESSURE: 74 MMHG | HEIGHT: 64 IN | SYSTOLIC BLOOD PRESSURE: 116 MMHG | HEART RATE: 71 BPM

## 2018-03-08 DIAGNOSIS — E83.52 HYPERCALCEMIA: Primary | ICD-10-CM

## 2018-03-08 DIAGNOSIS — E21.0 HYPERPARATHYROIDISM, PRIMARY (HCC): ICD-10-CM

## 2018-03-08 NOTE — MR AVS SNAPSHOT
[address] [facility] [address] [address] [phone] Patient: Jojo Gaines MRN: [id number] SNM:[birth year] Visit Information Date & Time Provider Department Dept. Phone Encounter #  
 3/8/2018  9:30 AM Ann Elkins, [facility] Diabetes and Endocrinology [phone] [id number] Follow-up Instructions Return in about 6 months (around 9/8/2018). Upcoming Health Maintenance Date Due Hepatitis C Screening 1950 DTaP/Tdap/Td series (1 - Tdap) 4/6/1971 BREAST CANCER SCRN MAMMOGRAM 4/6/2000 FOBT Q 1 YEAR AGE 50-75 4/6/2000 ZOSTER VACCINE AGE 60> 2/6/2010 GLAUCOMA SCREENING Q2Y 4/6/2015 Pneumococcal 65+ Low/Medium Risk (1 of 2 - PCV13) 4/6/2015 MEDICARE YEARLY EXAM 4/6/2015 Allergies as of 3/8/2018  Review Complete On: 3/8/2018 By: Ann Elkins MD  
 Not on File Current Immunizations  Never Reviewed No immunizations on file. Not reviewed this visit You Were Diagnosed With   
  
 Codes Comments Hypercalcemia    -  Primary ICD-10-CM: S36.65 
ICD-9-CM: 275.42 Hyperparathyroidism, primary ([facility] [address]     ICD-10-CM: E21.0 ICD-9-CM: 252.01 Vitals BP Pulse Height(growth percentile) Weight(growth percentile) BMI Smoking Status 116/74 (BP 1 Location: Left arm, BP Patient Position: Sitting) 71 5' 4\" (1.626 m) 197 lb 11.2 oz (89.7 kg) 33.94 kg/m2 Never Smoker BMI and BSA Data Body Mass Index Body Surface Area 33.94 kg/m 2 2.01 m 2 Preferred Pharmacy Pharmacy Name Phone [facility] [address] [facility] [phone] Your Updated Medication List  
  
   
This list is accurate as of 3/8/18  9:45 AM.  Always use your most recent med list. amLODIPine 10 mg tablet Commonly known as:  Johnnie Bangura Take  by mouth daily. aspirin 81 mg chewable tablet Take 81 mg by mouth daily. cholecalciferol (VITAMIN D3) 5,000 unit Tab tablet Commonly known as:  VITAMIN D3 Take  by mouth daily. cloNIDine HCl 0.1 mg tablet Commonly known as:  CATAPRES Take  by mouth two (2) times a day. enalapril 20 mg tablet Commonly known as:  Randene Qualia Take 20 mg by mouth daily. glipiZIDE SR 5 mg CR tablet Commonly known as:  GLUCOTROL XL  
daily. latanoprost 0.005 % ophthalmic solution Commonly known as:  XALATAN  
  
 metFORMIN 1,000 mg tablet Commonly known as:  GLUCOPHAGE  
two (2) times a day. metoprolol tartrate 100 mg IR tablet Commonly known as:  LOPRESSOR Take  by mouth two (2) times a day. OTHER  
daily. Calcium citrate + D ( Vit D3 500 units and Calcium 630  
  
 pravastatin 40 mg tablet Commonly known as:  PRAVACHOL Take 40 mg by mouth daily. We Performed the Following METABOLIC PANEL, COMPREHENSIVE [87202 CPT(R)] METABOLIC PANEL, COMPREHENSIVE [05280 CPT(R)] Comments:  
 6 month labs PTH INTACT [16824 CPT(R)] PTH INTACT [35701 CPT(R)] Comments:  
 6 month labs VITAMIN D, 25 HYDROXY [id number] CPT(R)] Follow-up Instructions Return in about 6 months (around 9/8/2018). Patient Instructions Plan to complete blood work near home, See you back in 6 months,  
 
Samul Osler T. [address] [place] [address] Diabetes & Endocrinology [address] [place] [address] Introducing [address]! [facility] introduces [employer] patient portal. Now you can access parts of your medical record, email your doctor's office, and request medication refills online. 1. In your internet browser, go to [email or web address] 2. Click on the First Time User? Click Here link in the Sign In box. You will see the New Member Sign Up page. 3. Enter your [employer] Access Code exactly as it appears below. You will not need to use this code after youve completed the sign-up process.  If you do not sign up before the expiration date, you must request a new code. · [employer] Access Code: [email or web address] Expires: 6/6/2018  9:44 AM 
 
4. Enter the last four digits of your Social Security Number (xxxx) and Date of Birth (mm/dd/yyyy) as indicated and click Submit. You will be taken to the next sign-up page. 5. Create a [employer] ID. This will be your [employer] login ID and cannot be changed, so think of one that is secure and easy to remember. 6. Create a [employer] password. You can change your password at any time. 7. Enter your Password Reset Question and Answer. This can be used at a later time if you forget your password. 8. Enter your e-mail address. You will receive e-mail notification when new information is available in [address] 9. Click Sign Up. You can now view and download portions of your medical record. 10. Click the Download Summary menu link to download a portable copy of your medical information. If you have questions, please visit the Frequently Asked Questions section of the [employer] website. Remember, [employer] is NOT to be used for urgent needs. For medical emergencies, dial 911. Now available from your iPhone and Android! Please provide this summary of care documentation to your next provider. Your primary care clinician is listed as Ksenia Bolaños. If you have any questions after today's visit, please call [phone].

## 2018-03-08 NOTE — PROGRESS NOTES
CHIEF COMPLAINT: f/u suspected primary hyperparathyroidism, asymptomatic     HISTORY OF PRESENT ILLNESS:   Brannon Bolanos is a 79 y.o. female with a PMHx as noted below who presents for f/u evaluation of hypercalcemia. Initial History:  Patient is noted for evaluation of hypercalcemia with Dr. Mia Dominguez back in 2012. She was noted for very low vitamin D, level of 11.7, and a PTH level of 39. Furthermore she had been on HCTZ which had been stopped. Her serum calcium level was always very mild at that time, around 10.3-10.4. No history of kidney stones. Family history is not significant for hypercalcemia. 3/14/17: DXA with T= - 1.1 at the FN, forarm not completed    Interval History:  Previously all labs were normal other than calcium at 11.2,  No clear causes for any potential non-PTH mediated hypercalcemia. As calcium level is mild and urine calcium normal, we decided to continue monitoring concervatively. She continues on D3 5000 U daily (level was 32) and calc citrate 650 daily  No recent labs for review,  Patient has not had any interval kidney stones and urine calcium prev was normal.  She is otherwise feeling well. PAST MEDICAL/SURGICAL HISTORY:   Past Medical History:   Diagnosis Date    Diabetes ([address]     HTN (hypertension)     Hyperlipemia      Past Surgical History:   Procedure Laterality Date    HX CHOLECYSTECTOMY         ALLERGIES:   Not on File    MEDICATIONS ON ADMISSION:     Current Outpatient Prescriptions:     glipiZIDE SR (GLUCOTROL XL) 5 mg CR tablet, daily. , Disp: , Rfl:     metFORMIN (GLUCOPHAGE) 1,000 mg tablet, two (2) times a day., Disp: , Rfl:     latanoprost (XALATAN) 0.005 % ophthalmic solution, , Disp: , Rfl:     OTHER, daily. Calcium citrate + D ( Vit D3 500 units and Calcium 630, Disp: , Rfl:     aspirin 81 mg chewable tablet, Take 81 mg by mouth daily. , Disp: , Rfl:     enalapril (VASOTEC) 20 mg tablet, Take 20 mg by mouth daily. , Disp: , Rfl:     amLODIPine (NORVASC) 10 mg tablet, Take  by mouth daily. , Disp: , Rfl:     cholecalciferol, VITAMIN D3, (VITAMIN D3) 5,000 unit tab tablet, Take  by mouth daily. , Disp: , Rfl:     cloNIDine HCl (CATAPRES) 0.1 mg tablet, Take  by mouth two (2) times a day., Disp: , Rfl:     pravastatin (PRAVACHOL) 40 mg tablet, Take 40 mg by mouth daily. , Disp: , Rfl:     metoprolol (LOPRESSOR) 100 mg tablet, Take  by mouth two (2) times a day., Disp: , Rfl:     SOCIAL HISTORY:   Social History     Social History    Marital status:      Spouse name: N/A    Number of children: N/A    Years of education: N/A     Occupational History    Not on file. Social History Main Topics    Smoking status: Never Smoker    Smokeless tobacco: Never Used    Alcohol use No    Drug use: No    Sexual activity: Not on file     Other Topics Concern    Not on file     Social History Narrative       FAMILY HISTORY:  Family History   Problem Relation Age of Onset    Diabetes Father     Kidney Disease Father     Diabetes Brother     Stroke Mother     Kidney Disease Brother     Other Brother      kidney stones       REVIEW OF SYSTEMS: Complete ROS assessed and noted for that which is described above, all else are negative.   Eyes: normal  ENT: normal  CVS: normal  Resp: normal  GI: normal  : normal  GYN: normal  Endocrine: normal  Integument: normal  Musculoskeletal: normal  Neuro: normal  Psych: normal      PHYSICAL EXAMINATION:    VITAL SIGNS:  Visit Vitals    /74 (BP 1 Location: Left arm, BP Patient Position: Sitting)    Pulse 71    Ht 5' 4\" (1.626 m)    Wt 197 lb 11.2 oz (89.7 kg)    BMI 33.94 kg/m2       GENERAL: NCAT, Sitting comfortably, NAD  EYES: EOMI, non-icteric, no proptosis  Ear/Nose/Throat: NCAT, no inflammation, no masses  LYMPH NODES: No LAD  CARDIOVASCULAR: S1 S2, RRR, No murmur, 2+ radial pulses  RESPIRATORY: CTA b/l, no wheeze/rales  GASTROINTESTINAL: NT, ND,  MUSCULOSKELETAL: Normal ROM, no atrophy  SKIN: warm, no edema/rash/ or other skin changes  NEUROLOGIC: 5/5 power all extremities, no tremors, AAOx3  PSYCHIATRIC: Normal affect, Normal insight and judgement         REVIEW OF LABORATORY AND RADIOLOGY DATA:   Labs and documentation have been reviewed as described above. ASSESSMENT AND PLAN:   Fredis Rodriguez is a 79 y.o. female with a PMHx as noted above who presents for f/u evaluation of hypercalcemia. Hypercalcemia  Suspected Primary hyperparathyroidism, mild asymptomatic    No evidence of non-PTH mediated hypercalcemia, calcium level has been mildly elevated, she has only borderline osteopenia, no kidney stones and urine calcium level was normal, and patient feels well. In light of this we had discussed the most likely diagnosis of primary hyperparathyroidism, and our conservative approach with calcium/D replacement and hydration, which is her preference. I did advise her to consider one point, and that is if she required surgery after 5-10 years due to progression, would she be in the same good and stable health at that time to proceed with surgery or would it have been better to have surgery these days instead. We are not leaning toward surgery however these concepts must be explored. I advised her to consider this between now and her next visit. Also the option of obtaining a parathyroid scan, and if a positive parathyroid adenoma is detected, if this would be information that would help her in deciding how to proceed. However I did advise that there is no need for a parathyroid scan now if she is not interested in proceeding with surgery. Plan:   Continue 5000 units of Vitamin D3 daily,  Can continue with calcium replacement,  PTH, CMP now and in 6 months, provided lab slip to complete near her home,  Hydration with water,    Plan for 6 month f/u visit, welcomed to call with any concerns,    Gibran Hernandez.  [address] [facility] Diabetes & Endocrinology

## 2018-03-08 NOTE — PATIENT INSTRUCTIONS
Plan to complete blood work near home,     See you back in 6 months,     Keyonna Osborne T.  [address] [facility] Endocrinology  [address] [place] [address]

## 2018-03-23 LAB
25(OH)D3+25(OH)D2 SERPL-MCNC: 68.1 NG/ML (ref 30–100)
ALBUMIN SERPL-MCNC: 4.3 G/DL (ref 3.6–4.8)
ALBUMIN/GLOB SERPL: 1.8 {RATIO} (ref 1.2–2.2)
ALP SERPL-CCNC: 64 IU/L (ref 39–117)
ALT SERPL-CCNC: 12 IU/L (ref 0–32)
AST SERPL-CCNC: 13 IU/L (ref 0–40)
BILIRUB SERPL-MCNC: 0.3 MG/DL (ref 0–1.2)
BUN SERPL-MCNC: 14 MG/DL (ref 8–27)
BUN/CREAT SERPL: 18 (ref 12–28)
CALCIUM SERPL-MCNC: 10.7 MG/DL (ref 8.7–10.3)
CHLORIDE SERPL-SCNC: 103 MMOL/L (ref 96–106)
CO2 SERPL-SCNC: 25 MMOL/L (ref 18–29)
CREAT SERPL-MCNC: 0.76 MG/DL (ref 0.57–1)
GFR SERPLBLD CREATININE-BSD FMLA CKD-EPI: 81 ML/MIN/1.73
GFR SERPLBLD CREATININE-BSD FMLA CKD-EPI: 94 ML/MIN/1.73
GLOBULIN SER CALC-MCNC: 2.4 G/DL (ref 1.5–4.5)
GLUCOSE SERPL-MCNC: 129 MG/DL (ref 65–99)
POTASSIUM SERPL-SCNC: 4 MMOL/L (ref 3.5–5.2)
PROT SERPL-MCNC: 6.7 G/DL (ref 6–8.5)
PTH-INTACT SERPL-MCNC: 28 PG/ML (ref 15–65)
SODIUM SERPL-SCNC: 143 MMOL/L (ref 134–144)

## 2018-03-26 NOTE — PROGRESS NOTES
On calcium and vitamin D replacement,   Her serum calcium has come down from 11.2 to 10.7,   PTH remains low normal at 28,  Recommend she continue on current therapy with conservative approach,  To stay well hydrated,     She will repeat labs before next visit,  Attempted to call patient a home number, no answer, mobile number resulted in someone hanging up after they indicated they are not Ms Shearer. Can try again later,    Collette Pfeiffer.  [address] [facility] Endocrinology  [address]

## 2018-04-19 ENCOUNTER — TELEPHONE (OUTPATIENT)
Dept: ENDOCRINOLOGY | Age: 68
End: 2018-04-19

## 2018-04-19 NOTE — TELEPHONE ENCOUNTER
Attempted to reach her previously but was not able per my note: \"On calcium and vitamin D replacement,   Her serum calcium has come down from 11.2 to 10.7,   PTH remains low normal at 28, this is stable. Recommend she continue on current therapy with conservative approach\"    Yvette Lawson.  [address] [facility] Endocrinology  [address] [place] [address]

## 2018-04-20 NOTE — TELEPHONE ENCOUNTER
I called Ms. Shearer and relayed the message from Dr. Leslee Grey. She understood the information and will do as instructed.   Yash Cortés

## 2018-08-25 LAB
ALBUMIN SERPL-MCNC: 3.7 G/DL (ref 3.6–4.8)
ALBUMIN/GLOB SERPL: 1.4 {RATIO} (ref 1.2–2.2)
ALP SERPL-CCNC: 75 IU/L (ref 39–117)
ALT SERPL-CCNC: 10 IU/L (ref 0–32)
AST SERPL-CCNC: 12 IU/L (ref 0–40)
BILIRUB SERPL-MCNC: 0.2 MG/DL (ref 0–1.2)
BUN SERPL-MCNC: 12 MG/DL (ref 8–27)
BUN/CREAT SERPL: 16 (ref 12–28)
CALCIUM SERPL-MCNC: 10.4 MG/DL (ref 8.7–10.3)
CHLORIDE SERPL-SCNC: 107 MMOL/L (ref 96–106)
CO2 SERPL-SCNC: 24 MMOL/L (ref 20–29)
CREAT SERPL-MCNC: 0.75 MG/DL (ref 0.57–1)
GLOBULIN SER CALC-MCNC: 2.6 G/DL (ref 1.5–4.5)
GLUCOSE SERPL-MCNC: 180 MG/DL (ref 65–99)
POTASSIUM SERPL-SCNC: 4 MMOL/L (ref 3.5–5.2)
PROT SERPL-MCNC: 6.3 G/DL (ref 6–8.5)
PTH-INTACT SERPL-MCNC: 34 PG/ML (ref 15–65)
SODIUM SERPL-SCNC: 148 MMOL/L (ref 134–144)

## 2018-09-06 ENCOUNTER — OFFICE VISIT (OUTPATIENT)
Dept: ENDOCRINOLOGY | Age: 68
End: 2018-09-06

## 2018-09-06 VITALS
HEIGHT: 64 IN | SYSTOLIC BLOOD PRESSURE: 127 MMHG | WEIGHT: 199.8 LBS | BODY MASS INDEX: 34.11 KG/M2 | HEART RATE: 66 BPM | DIASTOLIC BLOOD PRESSURE: 77 MMHG

## 2018-09-06 DIAGNOSIS — E83.52 HYPERCALCEMIA: Primary | ICD-10-CM

## 2018-09-06 DIAGNOSIS — R73.01 IMPAIRED FASTING GLUCOSE: ICD-10-CM

## 2018-09-06 DIAGNOSIS — E21.0 HYPERPARATHYROIDISM, PRIMARY (HCC): ICD-10-CM

## 2018-09-06 NOTE — MR AVS SNAPSHOT
[address] [facility] [address] [address] [phone] Patient: Leisa Lu MRN: [id number] DJP:[birth year] Visit Information Date & Time Provider Department Dept. Phone Encounter #  
 9/6/2018  9:30 AM Ermias Marques, [address] [place] [address] Diabetes and Endocrinology 21  Follow-up Instructions Return in about 6 months (around 3/6/2019). Upcoming Health Maintenance Date Due Hepatitis C Screening 1950 DTaP/Tdap/Td series (1 - Tdap) 4/6/1971 BREAST CANCER SCRN MAMMOGRAM 4/6/2000 FOBT Q 1 YEAR AGE 50-75 4/6/2000 ZOSTER VACCINE AGE 60> 2/6/2010 GLAUCOMA SCREENING Q2Y 4/6/2015 Pneumococcal 65+ Low/Medium Risk (1 of 2 - PCV13) 4/6/2015 MEDICARE YEARLY EXAM 3/14/2018 Influenza Age 5 to Adult 8/1/2018 Allergies as of 9/6/2018  Review Complete On: 9/6/2018 By: Ermias Marques MD  
 No Known Allergies Current Immunizations  Never Reviewed No immunizations on file. Not reviewed this visit You Were Diagnosed With   
  
 Codes Comments Hypercalcemia    -  Primary ICD-10-CM: Z15.86 
ICD-9-CM: 275.42 Hyperparathyroidism, primary ([facility] [address]     ICD-10-CM: E21.0 ICD-9-CM: 252.01 Vitals BP Pulse Height(growth percentile) Weight(growth percentile) BMI Smoking Status 127/77 (BP 1 Location: Left arm, BP Patient Position: Sitting) 66 5' 4\" (1.626 m) 199 lb 12.8 oz (90.6 kg) 34.3 kg/m2 Never Smoker BMI and BSA Data Body Mass Index Body Surface Area  
 34.3 kg/m 2 2.02 m 2 Preferred Pharmacy Pharmacy Name Phone [facility] [address] [facility] [phone] Your Updated Medication List  
  
   
This list is accurate as of 9/6/18  9:49 AM.  Always use your most recent med list. amLODIPine 10 mg tablet Commonly known as:  Evin Alstrom Take  by mouth daily. aspirin 81 mg chewable tablet Take 81 mg by mouth daily. cholecalciferol (VITAMIN D3) 5,000 unit Tab tablet Commonly known as:  VITAMIN D3 Take  by mouth daily. cloNIDine HCl 0.1 mg tablet Commonly known as:  CATAPRES Take  by mouth two (2) times a day. enalapril 20 mg tablet Commonly known as:  Bakari Kirby Take 20 mg by mouth daily. glipiZIDE SR 5 mg CR tablet Commonly known as:  GLUCOTROL XL  
daily. latanoprost 0.005 % ophthalmic solution Commonly known as:  XALATAN  
  
 metFORMIN 1,000 mg tablet Commonly known as:  GLUCOPHAGE  
two (2) times a day. metoprolol tartrate 100 mg IR tablet Commonly known as:  LOPRESSOR Take  by mouth two (2) times a day. OTHER  
daily. Calcium citrate + D ( Vit D3 500 units and Calcium 630  
  
 pravastatin 40 mg tablet Commonly known as:  PRAVACHOL Take 40 mg by mouth daily. We Performed the Following METABOLIC PANEL, BASIC [74292 CPT(R)] PTH INTACT [54094 CPT(R)] VITAMIN D, 25 HYDROXY S204387 CPT(R)] Follow-up Instructions Return in about 6 months (around 3/6/2019). Introducing [facility] & HEALTH SERVICES! [facility] introduces [employer] patient portal. Now you can access parts of your medical record, email your doctor's office, and request medication refills online. 1. In your internet browser, go to [email or web address] 2. Click on the First Time User? Click Here link in the Sign In box. You will see the New Member Sign Up page. 3. Enter your [employer] Access Code exactly as it appears below. You will not need to use this code after youve completed the sign-up process. If you do not sign up before the expiration date, you must request a new code. · [employer] Access Code: [email or web address] Expires: 12/5/2018  9:47 AM 
 
4. Enter the last four digits of your Social Security Number (xxxx) and Date of Birth (mm/dd/yyyy) as indicated and click Submit.  You will be taken to the next sign-up page. 5. Create a [employer] ID. This will be your [employer] login ID and cannot be changed, so think of one that is secure and easy to remember. 6. Create a [employer] password. You can change your password at any time. 7. Enter your Password Reset Question and Answer. This can be used at a later time if you forget your password. 8. Enter your e-mail address. You will receive e-mail notification when new information is available in [address] 9. Click Sign Up. You can now view and download portions of your medical record. 10. Click the Download Summary menu link to download a portable copy of your medical information. If you have questions, please visit the Frequently Asked Questions section of the [employer] website. Remember, [employer] is NOT to be used for urgent needs. For medical emergencies, dial 911. Now available from your iPhone and Android! Please provide this summary of care documentation to your next provider. Your primary care clinician is listed as Daisy Morelos. If you have any questions after today's visit, please call [phone].

## 2018-09-06 NOTE — PROGRESS NOTES
CHIEF COMPLAINT: f/u suspected primary hyperparathyroidism, asymptomatic     HISTORY OF PRESENT ILLNESS:   Viviane Sandoval is a 76 y.o. female with a PMHx as noted below who presents for f/u evaluation of hypercalcemia. Initial History:  Patient is noted for evaluation of hypercalcemia with Dr. Tejal Durham back in 2012. She was noted for very low vitamin D, level of 11.7, and a PTH level of 39. Furthermore she had been on HCTZ which had been stopped. Her serum calcium level was always very mild at that time, around 10.3-10.4. No history of kidney stones. Family history is not significant for hypercalcemia.    3/14/17: DXA with T= - 1.1 at the FN, forarm not completed    Interval History:  Previously her calcium level had improved on shantel/D replacement, recently 10.4,  Her PTH level had remained low normal in the 20's which is ideal,  We had planned to continue monitoring conservatively,  She continues on D3 5000 U daily (level was 32) and calc citrate 650 daily  No recent labs for review,  Patient notably has not had interval kidney stones and urine calcium prev was normal.  She is doing just fine today,    Review of most recent bone-related metabolic lab panel:  Lab Results   Component Value Date    PTHILT 34 08/24/2018    PTHILT 28 03/22/2018    PTHILT 30 11/24/2017    [id number] <1.1 08/28/2017    CA 10.4 (H) 08/24/2018    CA 10.7 (H) 03/22/2018    CA 11.2 (H) 11/24/2017    VITD3 68.1 03/22/2018    VITD3 67.1 08/28/2017    VITD3 32.9 03/14/2017    CALU2 144.0 03/13/2017    TSH 2.470 08/28/2017     [id number]: PTHrP level  [id number]: Serum NTX level  PTHILT: Intact PTH level  CILT: Ionized Calcium    PAST MEDICAL/SURGICAL HISTORY:   Past Medical History:   Diagnosis Date    Diabetes ([address]     HTN (hypertension)     Hyperlipemia      Past Surgical History:   Procedure Laterality Date    HX CHOLECYSTECTOMY         ALLERGIES:   No Known Allergies    MEDICATIONS ON ADMISSION:     Current Outpatient Prescriptions:   glipiZIDE SR (GLUCOTROL XL) 5 mg CR tablet, daily. , Disp: , Rfl:     metFORMIN (GLUCOPHAGE) 1,000 mg tablet, two (2) times a day., Disp: , Rfl:     latanoprost (XALATAN) 0.005 % ophthalmic solution, , Disp: , Rfl:     aspirin 81 mg chewable tablet, Take 81 mg by mouth daily. , Disp: , Rfl:     amLODIPine (NORVASC) 10 mg tablet, Take  by mouth daily. , Disp: , Rfl:     cholecalciferol, VITAMIN D3, (VITAMIN D3) 5,000 unit tab tablet, Take  by mouth daily. , Disp: , Rfl:     cloNIDine HCl (CATAPRES) 0.1 mg tablet, Take  by mouth two (2) times a day., Disp: , Rfl:     pravastatin (PRAVACHOL) 40 mg tablet, Take 40 mg by mouth daily. , Disp: , Rfl:     metoprolol (LOPRESSOR) 100 mg tablet, Take  by mouth two (2) times a day., Disp: , Rfl:     OTHER, daily. Calcium citrate + D ( Vit D3 500 units and Calcium 630, Disp: , Rfl:     enalapril (VASOTEC) 20 mg tablet, Take 20 mg by mouth daily. , Disp: , Rfl:     SOCIAL HISTORY:   Social History     Social History    Marital status:      Spouse name: N/A    Number of children: N/A    Years of education: N/A     Occupational History    Not on file. Social History Main Topics    Smoking status: Never Smoker    Smokeless tobacco: Never Used    Alcohol use No    Drug use: No    Sexual activity: Not on file     Other Topics Concern    Not on file     Social History Narrative       FAMILY HISTORY:  Family History   Problem Relation Age of Onset    Diabetes Father     Kidney Disease Father     Diabetes Brother     Stroke Mother     Kidney Disease Brother     Other Brother      kidney stones       REVIEW OF SYSTEMS: Complete ROS assessed and noted for that which is described above, all else are negative.   Eyes: normal  ENT: normal  CVS: normal  Resp: normal  GI: normal  : normal  GYN: normal  Endocrine: normal  Integument: normal  Musculoskeletal: normal  Neuro: normal  Psych: normal      PHYSICAL EXAMINATION:    VITAL SIGNS:  Visit Vitals    BP 127/77 (BP 1 Location: Left arm, BP Patient Position: Sitting)    Pulse 66    Ht 5' 4\" (1.626 m)    Wt 199 lb 12.8 oz (90.6 kg)    BMI 34.3 kg/m2       GENERAL: NCAT, Sitting comfortably, NAD  EYES: EOMI, non-icteric, no proptosis  Ear/Nose/Throat: NCAT, no inflammation, no masses  LYMPH NODES: No LAD  CARDIOVASCULAR: S1 S2, RRR, No murmur, 2+ radial pulses  RESPIRATORY: CTA b/l, no wheeze/rales  GASTROINTESTINAL: NT, ND,  MUSCULOSKELETAL: Normal ROM, no atrophy  SKIN: warm, no edema/rash/ or other skin changes  NEUROLOGIC: 5/5 power all extremities, no tremors, AAOx3  PSYCHIATRIC: Normal affect, Normal insight and judgement         REVIEW OF LABORATORY AND RADIOLOGY DATA:   Labs and documentation have been reviewed as described above. ASSESSMENT AND PLAN:   Erin Feliciano is a 76 y.o. female with a PMHx as noted above who presents for f/u evaluation of hypercalcemia. Hypercalcemia  Suspected Primary hyperparathyroidism, mild asymptomatic  Prediabetes    Patients serum calcium appears to be almost back to normal. Reviewing her trend of serum calcium as see above, it is evident that calcium and vitamin D replacement has helped her serum calcium levels to trend back down, almost normal. Patients serum blood sugar was noted however to be elevated on her cmp and she noted she is prediabetic. She describes that Dr. Mile Villela is no longer her primary doctor since he has left the practice, and so she is establishing care with a new provider. I advised her to continue her metformin twice daily, advised a walk 15 minutes, 1 hour after her dinner each evening, and recommended 45 gm max carb per meal.     Plan:   Continue 5000 units of Vitamin D3 daily,  Can continue with calcium replacement,  Bone prelabs before next visit,   Continue metformin twice daily, Dietary changes /exercise as described,  We can check her POCA1c on next visit as well IFserum blood sugar appears elevated.      Plan for 6 month f/u visit, welcomed to call with any concerns,    Patric Staton.  [address] [facility] Diabetes & Endocrinology

## 2019-01-15 ENCOUNTER — OFFICE VISIT (OUTPATIENT)
Dept: ENDOCRINOLOGY | Age: 69
End: 2019-01-15

## 2019-01-15 VITALS
SYSTOLIC BLOOD PRESSURE: 128 MMHG | WEIGHT: 197.3 LBS | DIASTOLIC BLOOD PRESSURE: 58 MMHG | BODY MASS INDEX: 33.68 KG/M2 | HEART RATE: 65 BPM | HEIGHT: 64 IN

## 2019-01-15 DIAGNOSIS — E21.0 HYPERPARATHYROIDISM, PRIMARY (HCC): ICD-10-CM

## 2019-01-15 DIAGNOSIS — E74.89 OTHER SPECIFIED DISORDERS OF CARBOHYDRATE METABOLISM (HCC): ICD-10-CM

## 2019-01-15 DIAGNOSIS — E83.52 HYPERCALCEMIA: Primary | ICD-10-CM

## 2019-01-15 LAB — HBA1C MFR BLD HPLC: 7 %

## 2019-01-15 NOTE — PATIENT INSTRUCTIONS
Plan for labs today at the laboratory,    I will call you with results and plan,     Plan to return to the clinic in 3 months,     Try to repeat the blood work ordered using the lab slip provided 3 days before next visit. This is different from the blood work we ordered for you to complete today,     Hallie Poon.  [address] [facility] Endocrinology  [address] [place] [address]

## 2019-01-15 NOTE — PROGRESS NOTES
CHIEF COMPLAINT: f/u suspected primary hyperparathyroidism, asymptomatic     HISTORY OF PRESENT ILLNESS:   Oliver Marrufo is a 76 y.o. female with a PMHx as noted below who presents for f/u evaluation of hypercalcemia. Initial History:  Patient is noted for evaluation of hypercalcemia with Dr. Michael Linares back in 2012. She was noted for very low vitamin D, level of 11.7, and a PTH level of 39. Furthermore she had been on HCTZ which had been stopped. Her serum calcium level was always very mild at that time, around 10.3-10.4. No history of kidney stones. Family history is not significant for hypercalcemia. 3/14/17: DXA with T= - 1.1 at the [facility], forarm not completed    Interval History:  Previously her calcium level had improved on shantel/D replacement, last on file was 10.4 in Aug 2018. Her PTH was low normal and stable. She had some prelabs ordered but has not completed them for this visit. We had planned to continue monitoring conservatively considering treatment response. She continues on D3 5000 U daily but no longer taking calc citrate 650 daily pre review of meds. No interval kidney stones and urine calcium was evaluated in the past was was normal.  Patient describes having been to the ED with low calcium, record not available,   She reports she has some aching but not now, no active muscle spasms.    Prev with neck/shoulder/hip discomfort, on tramadol, and diazepam per meds reviewed,    Review of most recent bone-related metabolic lab panel:  Lab Results   Component Value Date    PTHILT 34 08/24/2018    PTHILT 28 03/22/2018    PTHILT 30 11/24/2017    [id number] <1.1 08/28/2017    CA 10.4 (H) 08/24/2018    CA 10.7 (H) 03/22/2018    CA 11.2 (H) 11/24/2017    VITD3 68.1 03/22/2018    VITD3 67.1 08/28/2017    VITD3 32.9 03/14/2017    CALU2 144.0 03/13/2017    TSH 2.470 08/28/2017     [id number]: PTHrP level  [id number]: Serum NTX level  PTHILT: Intact PTH level  CILT: Ionized Calcium    PAST MEDICAL/SURGICAL HISTORY:   Past Medical History:   Diagnosis Date    Diabetes ([facility] [address]     HTN (hypertension)     Hyperlipemia      Past Surgical History:   Procedure Laterality Date    HX CHOLECYSTECTOMY         ALLERGIES:   No Known Allergies    MEDICATIONS ON ADMISSION:     Current Outpatient Medications:     glipiZIDE SR (GLUCOTROL XL) 5 mg CR tablet, daily. , Disp: , Rfl:     metFORMIN (GLUCOPHAGE) 1,000 mg tablet, two (2) times a day., Disp: , Rfl:     latanoprost (XALATAN) 0.005 % ophthalmic solution, , Disp: , Rfl:     aspirin 81 mg chewable tablet, Take 81 mg by mouth daily. , Disp: , Rfl:     amLODIPine (NORVASC) 10 mg tablet, Take  by mouth daily. , Disp: , Rfl:     cholecalciferol, VITAMIN D3, (VITAMIN D3) 5,000 unit tab tablet, Take  by mouth daily. , Disp: , Rfl:     cloNIDine HCl (CATAPRES) 0.1 mg tablet, Take  by mouth two (2) times a day., Disp: , Rfl:     pravastatin (PRAVACHOL) 40 mg tablet, Take 40 mg by mouth daily. , Disp: , Rfl:     metoprolol (LOPRESSOR) 100 mg tablet, Take  by mouth two (2) times a day., Disp: , Rfl:     OTHER, daily. Calcium citrate + D ( Vit D3 500 units and Calcium 630, Disp: , Rfl:     enalapril (VASOTEC) 20 mg tablet, Take 20 mg by mouth daily. , Disp: , Rfl:     SOCIAL HISTORY:   Social History     Socioeconomic History    Marital status:      Spouse name: Not on file    Number of children: Not on file    Years of education: Not on file    Highest education level: Not on file   Social Needs    Financial resource strain: Not on file    Food insecurity - worry: Not on file    Food insecurity - inability: Not on file   [employer] needs - medical: Not on file   [employer] needs - non-medical: Not on file   Occupational History    Not on file   Tobacco Use    Smoking status: Never Smoker    Smokeless tobacco: Never Used   Substance and Sexual Activity    Alcohol use: No    Drug use: No    Sexual activity: Not on file   Other Topics Concern    Not on file   Social History Narrative    Not on file       FAMILY HISTORY:  Family History   Problem Relation Age of Onset    Diabetes Father     Kidney Disease Father     Diabetes Brother     Stroke Mother     Kidney Disease Brother     Other Brother         kidney stones       REVIEW OF SYSTEMS: Complete ROS assessed and noted for that which is described above, all else are negative. Eyes: normal  ENT: normal  CVS: normal  Resp: normal  GI: normal  : normal  GYN: normal  Endocrine: normal  Integument: normal  Musculoskeletal: normal  Neuro: normal  Psych: normal    PHYSICAL EXAMINATION:    VITAL SIGNS:  Visit Vitals  /58 (BP 1 Location: Left arm, BP Patient Position: Sitting)   Pulse 65   Ht 5' 4\" (1.626 m)   Wt 197 lb 4.8 oz (89.5 kg)   BMI 33.87 kg/m²       GENERAL: NCAT, Sitting comfortably, NAD  EYES: EOMI, non-icteric, no proptosis  Ear/Nose/Throat: NCAT, no inflammation, no masses  LYMPH NODES: No LAD  CARDIOVASCULAR: S1 S2, RRR, No murmur, 2+ radial pulses  RESPIRATORY: CTA b/l, no wheeze/rales  GASTROINTESTINAL: NT, ND,  MUSCULOSKELETAL: Normal ROM, no atrophy  SKIN: warm, no edema/rash/ or other skin changes  NEUROLOGIC: 5/5 power all extremities, no tremors, AAOx3  PSYCHIATRIC: Normal affect, Normal insight and judgement    REVIEW OF LABORATORY AND RADIOLOGY DATA:   Labs and documentation have been reviewed as described above. ASSESSMENT AND PLAN:   Jones Navarro is a 76 y.o. female with a PMHx as noted above who presents for f/u evaluation of hypercalcemia. Hypercalcemia  Suspected Primary hyperparathyroidism, mild asymptomatic    It is not clear why patient has reported hypocalcemia, however we do know that her hypercalcemia previously responded well to calcium / D replacement, and this can potentially be a result of chronic calcium deficiency, and when she stopped taking her calcium supplements as directed, her calcium may have started to get low.  I am not sure that her calcium level is associated with some of the pain she had been having in the neck/hips etc, and some of that may be arthritic in nature, however we did discuss the typical symptoms of hypocalcemia. We will update her labs today and restart calcium replacement if appropriate. Plan:   Continue 5000 units of Vitamin D3 daily,  Can continue with calcium replacement,  BMP, PTH, and Vitamin D level today at the lab,    Her A1c today in clinic is 7.0% suggestive of diabetes (prev prediabetic), already on metformin and glipizide per review of med's and thus at goal. She should resume normal diabetes evaluation/screening/management with her primary care team.     Plan for 3 month f/u visit with Bakari Mcclure.  [address] [facility]

## 2019-01-16 LAB
25(OH)D3+25(OH)D2 SERPL-MCNC: 69.2 NG/ML (ref 30–100)
ALBUMIN SERPL-MCNC: 3.9 G/DL (ref 3.6–4.8)
ALBUMIN/GLOB SERPL: 1.4 {RATIO} (ref 1.2–2.2)
ALP SERPL-CCNC: 67 IU/L (ref 39–117)
ALT SERPL-CCNC: 13 IU/L (ref 0–32)
AST SERPL-CCNC: 13 IU/L (ref 0–40)
BILIRUB SERPL-MCNC: <0.2 MG/DL (ref 0–1.2)
BUN SERPL-MCNC: 12 MG/DL (ref 8–27)
BUN/CREAT SERPL: 17 (ref 12–28)
CALCIUM SERPL-MCNC: 10.8 MG/DL (ref 8.7–10.3)
CHLORIDE SERPL-SCNC: 106 MMOL/L (ref 96–106)
CO2 SERPL-SCNC: 27 MMOL/L (ref 20–29)
CREAT SERPL-MCNC: 0.71 MG/DL (ref 0.57–1)
GLOBULIN SER CALC-MCNC: 2.8 G/DL (ref 1.5–4.5)
GLUCOSE SERPL-MCNC: 114 MG/DL (ref 65–99)
POTASSIUM SERPL-SCNC: 4.7 MMOL/L (ref 3.5–5.2)
PROT SERPL-MCNC: 6.7 G/DL (ref 6–8.5)
PTH-INTACT SERPL-MCNC: 45 PG/ML (ref 15–65)
SODIUM SERPL-SCNC: 145 MMOL/L (ref 134–144)

## 2019-01-30 ENCOUNTER — TELEPHONE (OUTPATIENT)
Dept: ENDOCRINOLOGY | Age: 69
End: 2019-01-30

## 2019-01-30 NOTE — TELEPHONE ENCOUNTER
I attempted to call Ms. Shearer and the phone just rang and rang. There was no answering machine. I will try again.   Bekah Aceves

## 2019-01-30 NOTE — TELEPHONE ENCOUNTER
Vitamin D and parathyroid hormone level are stable,  Her calcium is 10.8 which is elevated but has been stable below 11 mostly. As long as she has no symptoms, and notably had stable bone density on DXA, we will continue current management. Thanks ! Sushma Ceballos.  [facility]  [address] [place] [address]

## 2019-04-13 LAB
BUN SERPL-MCNC: 16 MG/DL (ref 8–27)
BUN/CREAT SERPL: 22 (ref 12–28)
CALCIUM SERPL-MCNC: 10.7 MG/DL (ref 8.7–10.3)
CHLORIDE SERPL-SCNC: 106 MMOL/L (ref 96–106)
CO2 SERPL-SCNC: 27 MMOL/L (ref 20–29)
CREAT SERPL-MCNC: 0.73 MG/DL (ref 0.57–1)
GLUCOSE SERPL-MCNC: 143 MG/DL (ref 65–99)
POTASSIUM SERPL-SCNC: 3.8 MMOL/L (ref 3.5–5.2)
PTH-INTACT SERPL-MCNC: 38 PG/ML (ref 15–65)
SODIUM SERPL-SCNC: 147 MMOL/L (ref 134–144)

## 2019-04-17 ENCOUNTER — OFFICE VISIT (OUTPATIENT)
Dept: ENDOCRINOLOGY | Age: 69
End: 2019-04-17

## 2019-04-17 VITALS
BODY MASS INDEX: 32.95 KG/M2 | SYSTOLIC BLOOD PRESSURE: 99 MMHG | DIASTOLIC BLOOD PRESSURE: 66 MMHG | HEART RATE: 66 BPM | HEIGHT: 64 IN | WEIGHT: 193 LBS

## 2019-04-17 DIAGNOSIS — E74.89 OTHER SPECIFIED DISORDERS OF CARBOHYDRATE METABOLISM (HCC): ICD-10-CM

## 2019-04-17 DIAGNOSIS — E21.0 HYPERPARATHYROIDISM, PRIMARY (HCC): ICD-10-CM

## 2019-04-17 DIAGNOSIS — E83.52 HYPERCALCEMIA: Primary | ICD-10-CM

## 2019-04-17 RX ORDER — LANOLIN ALCOHOL/MO/W.PET/CERES
325 CREAM (GRAM) TOPICAL
COMMUNITY
Start: 2019-03-26 | End: 2022-03-30 | Stop reason: SDUPTHER

## 2019-04-17 RX ORDER — CALCIUM CARBONATE 200(500)MG
1 TABLET,CHEWABLE ORAL
Qty: 200 TAB | Refills: 5 | Status: SHIPPED | OUTPATIENT
Start: 2019-04-17 | End: 2019-04-24 | Stop reason: SDUPTHER

## 2019-04-17 NOTE — PATIENT INSTRUCTIONS
Vitamin D3: Take 5000 units once daily with breakfast    Calcium: Take 500mg with breakfast and dinner each day, ordered to pharmacy    Bone Density: Repeat bone density scan, make sure that the technician also includes the forarm    See you back in 6 months,    * Remember to have your blood work collected at the laboratory 3 days before your next visit using the lab sheet provided during your visit. Jesus Guaman.  [address] [facility]  [address] [place] [address]

## 2019-04-17 NOTE — PROGRESS NOTES
CHIEF COMPLAINT: f/u suspected primary hyperparathyroidism, asymptomatic     HISTORY OF PRESENT ILLNESS:   Rigoberto Nunez is a 71 y.o. female with a PMHx as noted below who presents for f/u evaluation of hypercalcemia. Initial History:  Patient is noted for evaluation of hypercalcemia with Dr. Elvis Johnson back in 2012. She was noted for very low vitamin D, level of 11.7, and a PTH level of 39. Furthermore she had been on HCTZ which had been stopped. Her serum calcium level was always very mild at that time, around 10.3-10.4. No history of kidney stones. Family history is not significant for hypercalcemia. 3/14/17: DXA with T= - 1.1 at the [facility], forarm not completed    Interval History:  Patient continues on calcium / D replacement, conservative management of asymptomatic hyperparathyroidism,  She continues on D3 5000 U daily but not taking calcium. No interval kidney stones and urine calcium was evaluated in the past was was normal.  Review of her labs demonstrates a stable PTH and Vit D, calcium 10.7, not worsening since last visit. She has not had a repeat DXA since last visit. Reports feeling stable these days.    Review of most recent bone-related metabolic lab panel:  Lab Results   Component Value Date    PTHILT 38 04/12/2019    PTHILT 45 01/15/2019    PTHILT 34 08/24/2018    [id number] <1.1 08/28/2017    CA 10.7 (H) 04/12/2019    CA 10.8 (H) 01/15/2019    CA 10.4 (H) 08/24/2018    CALU2 144.0 03/13/2017    VITD3 69.2 01/15/2019    VITD3 68.1 03/22/2018    VITD3 67.1 08/28/2017    GFRNA 84 04/12/2019    GFRNA 88 01/15/2019    GFRNA 82 08/24/2018    GFRAA 97 04/12/2019    GFRAA 101 01/15/2019    GFRAA 95 08/24/2018    TSH 2.470 08/28/2017     LAB KEY:  [id number]: PTHrP level  [id number]: Serum NTX level  PTHILT: Intact PTH level  PTHRLT:  PTH-rP  CILT: Ionized Calcium  CALU2: 24 hr urine calcium      PAST MEDICAL/SURGICAL HISTORY:   Past Medical History:   Diagnosis Date    Diabetes ([address]     HTN (hypertension)     Hyperlipemia      Past Surgical History:   Procedure Laterality Date    HX CHOLECYSTECTOMY         ALLERGIES:   No Known Allergies    MEDICATIONS ON ADMISSION:     Current Outpatient Medications:     ferrous sulfate 325 mg (65 mg iron) tablet, Take 325 mg by mouth., Disp: , Rfl:     glipiZIDE SR (GLUCOTROL XL) 5 mg CR tablet, daily. , Disp: , Rfl:     metFORMIN (GLUCOPHAGE) 1,000 mg tablet, two (2) times a day., Disp: , Rfl:     latanoprost (XALATAN) 0.005 % ophthalmic solution, , Disp: , Rfl:     OTHER, daily. Calcium citrate + D ( Vit D3 500 units and Calcium 630, Disp: , Rfl:     aspirin 81 mg chewable tablet, Take 81 mg by mouth daily. , Disp: , Rfl:     amLODIPine (NORVASC) 10 mg tablet, Take  by mouth daily. , Disp: , Rfl:     cholecalciferol, VITAMIN D3, (VITAMIN D3) 5,000 unit tab tablet, Take  by mouth daily. , Disp: , Rfl:     cloNIDine HCl (CATAPRES) 0.1 mg tablet, Take  by mouth two (2) times a day., Disp: , Rfl:     pravastatin (PRAVACHOL) 40 mg tablet, Take 40 mg by mouth daily. , Disp: , Rfl:     metoprolol (LOPRESSOR) 100 mg tablet, Take  by mouth two (2) times a day., Disp: , Rfl:     enalapril (VASOTEC) 20 mg tablet, Take 20 mg by mouth daily. , Disp: , Rfl:     SOCIAL HISTORY:   Social History     Socioeconomic History    Marital status:      Spouse name: Not on file    Number of children: Not on file    Years of education: Not on file    Highest education level: Not on file   Occupational History    Not on file   Social Needs    Financial resource strain: Not on file    Food insecurity:     Worry: Not on file     Inability: Not on file    Transportation needs:     Medical: Not on file     Non-medical: Not on file   Tobacco Use    Smoking status: Never Smoker    Smokeless tobacco: Never Used   Substance and Sexual Activity    Alcohol use: No    Drug use: No    Sexual activity: Not on file   Lifestyle    Physical activity:     Days per week: Not on file     Minutes per session: Not on file    Stress: Not on file   Relationships    Social connections:     Talks on phone: Not on file     Gets together: Not on file     Attends Christianity service: Not on file     Active member of club or organization: Not on file     Attends meetings of clubs or organizations: Not on file     Relationship status: Not on file    Intimate partner violence:     Fear of current or ex partner: Not on file     Emotionally abused: Not on file     Physically abused: Not on file     Forced sexual activity: Not on file   Other Topics Concern    Not on file   Social History Narrative    Not on file       FAMILY HISTORY:  Family History   Problem Relation Age of Onset    Diabetes Father     Kidney Disease Father     Diabetes Brother     Stroke Mother     Kidney Disease Brother     Other Brother         kidney stones       REVIEW OF SYSTEMS: Complete ROS assessed and noted for that which is described above, all else are negative. Eyes: normal  ENT: normal  CVS: normal  Resp: normal  GI: normal  : normal  GYN: normal  Endocrine: normal  Integument: normal  Musculoskeletal: normal  Neuro: normal  Psych: normal    PHYSICAL EXAMINATION:    VITAL SIGNS:  Visit Vitals  BP 99/66 (BP 1 Location: Left arm, BP Patient Position: Sitting)   Pulse 66   Ht 5' 4\" (1.626 m)   Wt 193 lb (87.5 kg)   BMI 33.13 kg/m²       GENERAL: NCAT, Sitting comfortably, NAD  EYES: EOMI, non-icteric, no proptosis  Ear/Nose/Throat: NCAT, no inflammation, no masses  LYMPH NODES: No LAD  CARDIOVASCULAR: S1 S2, RRR, No murmur, 2+ radial pulses  RESPIRATORY: CTA b/l, no wheeze/rales  GASTROINTESTINAL: NT, ND,  MUSCULOSKELETAL: Normal ROM, no atrophy  SKIN: warm, no edema/rash/ or other skin changes  NEUROLOGIC: 5/5 power all extremities, no tremors, AAOx3  PSYCHIATRIC: Normal affect, Normal insight and judgement    REVIEW OF LABORATORY AND RADIOLOGY DATA:   Labs and documentation have been reviewed as described above. ASSESSMENT AND PLAN:   Graciela Lockhart is a 71 y.o. female with a PMHx as noted above who presents for f/u evaluation of hypercalcemia. Hypercalcemia  Suspected Primary hyperparathyroidism, mild asymptomatic    Patient has asymptomatic hypercalcemia thought to be due to mild hyperparathyroidism ( \"inappropriately normal PTH level\" ). In her case her renal function and bone density have been stable and she has not had any falls, fractures, or kidney stones. We noted that there may be indications in the future that might guide us toward definitive therapy with surgery and the need to monitor over time. We will update her DXA to include the forarm this time, noting it was not assessed on prior scan. She will likely complete it near home ([facility] ?)    Plan:   Continue 5000 units of Vitamin D3 daily,  Start 500mg calcium carbonate (200mg elemental) BID with meals,  BMP, PTH, and Vitamin D level before next visit in 6 months,   DXA: Repeat DXA WITH ADRIANA,    Plan for 6 month f/u visit with Leanne Finley.  [address] [facility]

## 2019-04-24 NOTE — TELEPHONE ENCOUNTER
-----  Regarding: Dr Cochran/rx refill  Pt (p) [phone], said her [facility] mail order pharmacy has sent  Request for her Calcium pills she is out of the pills and hopes it can be sent in soon

## 2019-04-25 RX ORDER — CALCIUM CARBONATE 200(500)MG
1 TABLET,CHEWABLE ORAL
Qty: 200 TAB | Refills: 5 | Status: SHIPPED | OUTPATIENT
Start: 2019-04-25 | End: 2020-12-16 | Stop reason: ALTCHOICE

## 2019-10-12 LAB
BUN SERPL-MCNC: 14 MG/DL (ref 8–27)
BUN/CREAT SERPL: 19 (ref 12–28)
CALCIUM SERPL-MCNC: 11 MG/DL (ref 8.7–10.3)
CHLORIDE SERPL-SCNC: 106 MMOL/L (ref 96–106)
CO2 SERPL-SCNC: 25 MMOL/L (ref 20–29)
CREAT SERPL-MCNC: 0.74 MG/DL (ref 0.57–1)
GLUCOSE SERPL-MCNC: 139 MG/DL (ref 65–99)
POTASSIUM SERPL-SCNC: 4.1 MMOL/L (ref 3.5–5.2)
PTH-INTACT SERPL-MCNC: 31 PG/ML (ref 15–65)
SODIUM SERPL-SCNC: 147 MMOL/L (ref 134–144)

## 2019-10-16 ENCOUNTER — OFFICE VISIT (OUTPATIENT)
Dept: ENDOCRINOLOGY | Age: 69
End: 2019-10-16

## 2019-10-16 VITALS
TEMPERATURE: 97.2 F | RESPIRATION RATE: 16 BRPM | WEIGHT: 196.3 LBS | BODY MASS INDEX: 33.51 KG/M2 | DIASTOLIC BLOOD PRESSURE: 77 MMHG | HEART RATE: 58 BPM | OXYGEN SATURATION: 89 % | SYSTOLIC BLOOD PRESSURE: 125 MMHG | HEIGHT: 64 IN

## 2019-10-16 DIAGNOSIS — E83.52 HYPERCALCEMIA: ICD-10-CM

## 2019-10-16 DIAGNOSIS — E21.0 HYPERPARATHYROIDISM, PRIMARY (HCC): Primary | ICD-10-CM

## 2019-10-16 NOTE — PROGRESS NOTES
Identified pt with two pt identifiers(name and ). Reviewed record in preparation for visit and have obtained necessary documentation. Chief Complaint   Patient presents with    Thyroid Problem     follow up        Visit Vitals  /77 (BP 1 Location: Left arm, BP Patient Position: Sitting)   Pulse (!) 58   Temp 97.2 °F (36.2 °C) (Oral)   Resp 16   Ht 5' 4\" (1.626 m)   Wt 196 lb 4.8 oz (89 kg)   SpO2 (!) 89%   BMI 33.69 kg/m²       Pain Scale: 0 - No pain/10  Pain Location:     1. Have you been to the ER, urgent care clinic since your last visit? Hospitalized since your last visit? No    2. Have you seen or consulted any other health care providers outside of the [address] [place] [address] since your last visit? Include any pap smears or colon screening.  No

## 2019-10-16 NOTE — PATIENT INSTRUCTIONS
Vitamin D3: Take 5000 units once daily with breakfast 
 
Calcium: Holding this for now Ultrasound AND nuclear parathyroid scan ordered to evaluate for lesion. You can go ahead and call the scheduling department to have your procedure scheduled at a time that is convenient to you. Please call [phone]. See you back in 3 months, * Remember to have your blood work collected at the laboratory 3 days before your next visit using the lab sheet provided during your visit. Reji Escalante. [address] [facility] [address]

## 2019-10-16 NOTE — PROGRESS NOTES
CHIEF COMPLAINT: f/u suspected primary hyperparathyroidism, asymptomatic     HISTORY OF PRESENT ILLNESS:   Sandi Cross is a 71 y.o. female with a PMHx as noted below who presents for f/u evaluation of hypercalcemia. Initial History:  Patient is noted for evaluation of hypercalcemia with Dr. Kimo Theodore back in 2012. She was noted for very low vitamin D, level of 11.7, and a PTH level of 39. Furthermore she had been on HCTZ which had been stopped. Her serum calcium level was always very mild at that time, around 10.3-10.4. No history of kidney stones. Family history is not significant for hypercalcemia. 3/14/17: DXA with T= - 1.1 at the [facility], forarm not completed    Interval History:  Patient continues on calcium / D replacement, conservative management of asymptomatic hyperparathyroidism,  She continues on D3 5000 U daily but not taking calcium.    No interval kidney stones and urine calcium was evaluated in the past was was normal.  Review of her labs demonstrates a stable PTH and Vit D,   Her calcium however is trending up, now at 11.0,  She has not yet repeated DXA ordered on prior visit,  No falls or fractures since last visit,    Review of most recent bone-related metabolic lab panel:  Lab Results   Component Value Date    PTHILT 31 10/11/2019    PTHILT 38 04/12/2019    PTHILT 45 01/15/2019    [id number] <1.1 08/28/2017    CA 11.0 (H) 10/11/2019    CA 10.7 (H) 04/12/2019    CA 10.8 (H) 01/15/2019    CALU2 144.0 03/13/2017    VITD3 69.2 01/15/2019    VITD3 68.1 03/22/2018    VITD3 67.1 08/28/2017    GFRNA 83 10/11/2019    GFRNA 84 04/12/2019    GFRNA 88 01/15/2019    GFRAA 96 10/11/2019    GFRAA 97 04/12/2019    GFRAA 101 01/15/2019    TSH 2.470 08/28/2017     LAB KEY:  [id number]: PTHrP level  [id number]: Serum NTX level  PTHILT: Intact PTH level  PTHRLT:  PTH-rP  CILT: Ionized Calcium  CALU2: 24 hr urine calcium      PAST MEDICAL/SURGICAL HISTORY:   Past Medical History:   Diagnosis Date    Diabetes ([address]  HTN (hypertension)     Hyperlipemia      Past Surgical History:   Procedure Laterality Date    HX CHOLECYSTECTOMY         ALLERGIES:   No Known Allergies    MEDICATIONS ON ADMISSION:     Current Outpatient Medications:     ferrous sulfate 325 mg (65 mg iron) tablet, Take 325 mg by mouth., Disp: , Rfl:     glipiZIDE SR (GLUCOTROL XL) 5 mg CR tablet, daily. , Disp: , Rfl:     metFORMIN (GLUCOPHAGE) 1,000 mg tablet, two (2) times a day., Disp: , Rfl:     latanoprost (XALATAN) 0.005 % ophthalmic solution, , Disp: , Rfl:     aspirin 81 mg chewable tablet, Take 81 mg by mouth daily. , Disp: , Rfl:     amLODIPine (NORVASC) 10 mg tablet, Take  by mouth daily. , Disp: , Rfl:     cholecalciferol, VITAMIN D3, (VITAMIN D3) 5,000 unit tab tablet, Take  by mouth daily. , Disp: , Rfl:     cloNIDine HCl (CATAPRES) 0.1 mg tablet, Take  by mouth two (2) times a day., Disp: , Rfl:     pravastatin (PRAVACHOL) 40 mg tablet, Take 40 mg by mouth daily. , Disp: , Rfl:     metoprolol (LOPRESSOR) 100 mg tablet, Take  by mouth two (2) times a day., Disp: , Rfl:     calcium carbonate (TUMS) 200 mg calcium (500 mg) chew, Take 1 Tab by mouth Before breakfast and dinner., Disp: 200 Tab, Rfl: 5    OTHER, daily. Calcium citrate + D ( Vit D3 500 units and Calcium 630, Disp: , Rfl:     enalapril (VASOTEC) 20 mg tablet, Take 20 mg by mouth daily. , Disp: , Rfl:     SOCIAL HISTORY:   Social History     Socioeconomic History    Marital status:      Spouse name: Not on file    Number of children: Not on file    Years of education: Not on file    Highest education level: Not on file   Occupational History    Not on file   Social Needs    Financial resource strain: Not on file    Food insecurity:     Worry: Not on file     Inability: Not on file    Transportation needs:     Medical: Not on file     Non-medical: Not on file   Tobacco Use    Smoking status: Never Smoker    Smokeless tobacco: Never Used   Substance and Sexual Activity    Alcohol use: No    Drug use: No    Sexual activity: Not on file   Lifestyle    Physical activity:     Days per week: Not on file     Minutes per session: Not on file    Stress: Not on file   Relationships    Social connections:     Talks on phone: Not on file     Gets together: Not on file     Attends Yazdanism service: Not on file     Active member of club or organization: Not on file     Attends meetings of clubs or organizations: Not on file     Relationship status: Not on file    Intimate partner violence:     Fear of current or ex partner: Not on file     Emotionally abused: Not on file     Physically abused: Not on file     Forced sexual activity: Not on file   Other Topics Concern    Not on file   Social History Narrative    Not on file       FAMILY HISTORY:  Family History   Problem Relation Age of Onset    Diabetes Father     Kidney Disease Father     Diabetes Brother     Stroke Mother     Kidney Disease Brother     Other Brother         kidney stones       REVIEW OF SYSTEMS: Complete ROS assessed and noted for that which is described above, all else are negative.   Eyes: normal  ENT: normal  CVS: normal  Resp: normal  GI: normal  : normal  GYN: normal  Endocrine: normal  Integument: normal  Musculoskeletal: normal  Neuro: normal  Psych: normal    PHYSICAL EXAMINATION:    VITAL SIGNS:  Visit Vitals  /77 (BP 1 Location: Left arm, BP Patient Position: Sitting)   Pulse (!) 58   Temp 97.2 °F (36.2 °C) (Oral)   Resp 16   Ht 5' 4\" (1.626 m)   Wt 196 lb 4.8 oz (89 kg)   SpO2 (!) 89%   BMI 33.69 kg/m²       GENERAL: NCAT, Sitting comfortably, NAD  EYES: EOMI, non-icteric, no proptosis  Ear/Nose/Throat: NCAT, no inflammation, no masses  LYMPH NODES: No LAD  CARDIOVASCULAR: S1 S2, RRR, No murmur, 2+ radial pulses  RESPIRATORY: CTA b/l, no wheeze/rales  GASTROINTESTINAL: NT, ND,  MUSCULOSKELETAL: Normal ROM, no atrophy  SKIN: warm, no edema/rash/ or other skin changes  NEUROLOGIC: 5/5 power all extremities, no tremors, AAOx3  PSYCHIATRIC: Normal affect, Normal insight and judgement    REVIEW OF LABORATORY AND RADIOLOGY DATA:   Labs and documentation have been reviewed as described above. ASSESSMENT AND PLAN:   Shellia Essex is a 71 y.o. female with a PMHx as noted above who presents for f/u evaluation of hypercalcemia. Hypercalcemia  Suspected Primary hyperparathyroidism, mild asymptomatic    Calcium is trending up at 11 now. She remains asymptomatic. She is not excited about the idea of potential surgery however. I advised the best thing to do that this time is start obtaining imaging of the parathyroid glands, and repeat her levels at a sooner time, about 3 months from now. If does not trend back down and we have positive imaging studies, we will refer for surgical evaluation. If improves however we can continue monitoring conservatively however would still recommend imaging. Plan:   Continue 5000 units of Vitamin D3 daily,  Off calcium   BMP only before next visit  Imaging:    DXA: again reminded about DXA WITH ADRIANA, will complete this later however after imaging neck,   Obtaining US and NM parathyroid scan    Plan for 3 month f/u visit with Juan Carlos HALL  [address] [place] [address] Diabetes & Endocrinology

## 2019-10-31 ENCOUNTER — HOSPITAL ENCOUNTER (OUTPATIENT)
Dept: NUCLEAR MEDICINE | Age: 69
Discharge: HOME | End: 2019-10-31
Attending: INTERNAL MEDICINE
Payer: MEDICARE

## 2019-10-31 ENCOUNTER — HOSPITAL ENCOUNTER (OUTPATIENT)
Dept: ULTRASOUND IMAGING | Age: 69
Discharge: HOME | End: 2019-10-31
Attending: INTERNAL MEDICINE
Payer: MEDICARE

## 2019-10-31 DIAGNOSIS — E83.52 HYPERCALCEMIA: ICD-10-CM

## 2019-10-31 DIAGNOSIS — E21.0 HYPERPARATHYROIDISM, PRIMARY (HCC): ICD-10-CM

## 2019-10-31 PROCEDURE — 76536 US EXAM OF HEAD AND NECK: CPT

## 2019-10-31 PROCEDURE — 78070 PARATHYROID PLANAR IMAGING: CPT

## 2019-11-01 NOTE — PROGRESS NOTES
10/31/19: Parathyroid US & NM Parathyroid scan: \"1.3 cm hypoechoic nodule at the base of the right thyroid gland \"  I notified patient, spoke to her and her ,  She is not excited about possible surgery, we plan to repeat her calcium before next visit first,  Advised to stay hydrated with water,    Melinda HALL  [facility]  [address] [place] [address]

## 2020-01-25 LAB
BUN SERPL-MCNC: 11 MG/DL (ref 8–27)
BUN/CREAT SERPL: 15 (ref 12–28)
CALCIUM SERPL-MCNC: 10.7 MG/DL (ref 8.7–10.3)
CHLORIDE SERPL-SCNC: 105 MMOL/L (ref 96–106)
CO2 SERPL-SCNC: 25 MMOL/L (ref 20–29)
CREAT SERPL-MCNC: 0.71 MG/DL (ref 0.57–1)
GLUCOSE SERPL-MCNC: 134 MG/DL (ref 65–99)
POTASSIUM SERPL-SCNC: 4 MMOL/L (ref 3.5–5.2)
SODIUM SERPL-SCNC: 145 MMOL/L (ref 134–144)

## 2020-01-29 ENCOUNTER — OFFICE VISIT (OUTPATIENT)
Dept: ENDOCRINOLOGY | Age: 70
End: 2020-01-29

## 2020-01-29 VITALS
BODY MASS INDEX: 32.1 KG/M2 | SYSTOLIC BLOOD PRESSURE: 104 MMHG | HEART RATE: 66 BPM | HEIGHT: 64 IN | DIASTOLIC BLOOD PRESSURE: 67 MMHG | WEIGHT: 188 LBS

## 2020-01-29 DIAGNOSIS — E21.0 HYPERPARATHYROIDISM, PRIMARY (HCC): Primary | ICD-10-CM

## 2020-01-29 DIAGNOSIS — E83.52 HYPERCALCEMIA: ICD-10-CM

## 2020-01-29 NOTE — PROGRESS NOTES
CHIEF COMPLAINT: f/u suspected primary hyperparathyroidism, asymptomatic     HISTORY OF PRESENT ILLNESS:   Ralf Carrillo is a 71 y.o. female with a PMHx as noted below who presents for f/u evaluation of hypercalcemia. Patient is noted for evaluation of hypercalcemia with Dr. Enrike Florentino back in 2012. No history of kidney stones. Family history is not significant for hypercalcemia.    3/14/17: DXA with T= - 1.1 at the [facility], forarm not completed  10/31/19: NM Parathyroid Scan: Parathyroid adenoma base of the right thyroid gland  10/31/19: Thyroid US: 1.3 cm nodule, base of Rt lobe, compatible with parathyroid adenoma  Monitored conservatively per her preference, asymptomatic, with vitamin D replacement  She continues on D3 5000 U daily but not taking calcium  No interval kidney stones and urine calcium was evaluated in the past was was normal  Serum calcium down to 10.7 from prior 11.0  No falls or fractures since last visit,    Review of most recent bone-related metabolic lab panel:  Lab Results   Component Value Date    PTHILT 31 10/11/2019    PTHILT 38 04/12/2019    PTHILT 45 01/15/2019    [id number] <1.1 08/28/2017    CA 10.7 (H) 01/24/2020    CA 11.0 (H) 10/11/2019    CA 10.7 (H) 04/12/2019    CALU2 144.0 03/13/2017    VITD3 69.2 01/15/2019    VITD3 68.1 03/22/2018    VITD3 67.1 08/28/2017    GFRNA 87 01/24/2020    GFRNA 83 10/11/2019    GFRNA 84 04/12/2019    GFRAA 100 01/24/2020    GFRAA 96 10/11/2019    GFRAA 97 04/12/2019    TSH 2.470 08/28/2017     LAB KEY:  [id number]: PTHrP level  [id number]: Serum NTX level  PTHILT: Intact PTH level  PTHRLT:  PTH-rP  CILT: Ionized Calcium  CALU2: 24 hr urine calcium      PAST MEDICAL/SURGICAL HISTORY:   Past Medical History:   Diagnosis Date    Diabetes ([address]     HTN (hypertension)     Hyperlipemia      Past Surgical History:   Procedure Laterality Date    HX CHOLECYSTECTOMY         ALLERGIES:   No Known Allergies    MEDICATIONS ON ADMISSION:     Current Outpatient Medications:     calcium carbonate (TUMS) 200 mg calcium (500 mg) chew, Take 1 Tab by mouth Before breakfast and dinner., Disp: 200 Tab, Rfl: 5    ferrous sulfate 325 mg (65 mg iron) tablet, Take 325 mg by mouth., Disp: , Rfl:     glipiZIDE SR (GLUCOTROL XL) 5 mg CR tablet, daily. , Disp: , Rfl:     metFORMIN (GLUCOPHAGE) 1,000 mg tablet, two (2) times a day., Disp: , Rfl:     latanoprost (XALATAN) 0.005 % ophthalmic solution, , Disp: , Rfl:     aspirin 81 mg chewable tablet, Take 81 mg by mouth daily. , Disp: , Rfl:     amLODIPine (NORVASC) 10 mg tablet, Take  by mouth daily. , Disp: , Rfl:     cholecalciferol, VITAMIN D3, (VITAMIN D3) 5,000 unit tab tablet, Take  by mouth daily. , Disp: , Rfl:     cloNIDine HCl (CATAPRES) 0.1 mg tablet, Take  by mouth two (2) times a day., Disp: , Rfl:     pravastatin (PRAVACHOL) 40 mg tablet, Take 40 mg by mouth daily. , Disp: , Rfl:     metoprolol (LOPRESSOR) 100 mg tablet, Take  by mouth two (2) times a day., Disp: , Rfl:     OTHER, daily. Calcium citrate + D ( Vit D3 500 units and Calcium 630, Disp: , Rfl:     enalapril (VASOTEC) 20 mg tablet, Take 20 mg by mouth daily. , Disp: , Rfl:     SOCIAL HISTORY:   Social History     Socioeconomic History    Marital status:      Spouse name: Not on file    Number of children: Not on file    Years of education: Not on file    Highest education level: Not on file   Occupational History    Not on file   Social Needs    Financial resource strain: Not on file    Food insecurity:     Worry: Not on file     Inability: Not on file    Transportation needs:     Medical: Not on file     Non-medical: Not on file   Tobacco Use    Smoking status: Never Smoker    Smokeless tobacco: Never Used   Substance and Sexual Activity    Alcohol use: No    Drug use: No    Sexual activity: Not on file   Lifestyle    Physical activity:     Days per week: Not on file     Minutes per session: Not on file    Stress: Not on file Relationships    Social connections:     Talks on phone: Not on file     Gets together: Not on file     Attends Bahai service: Not on file     Active member of club or organization: Not on file     Attends meetings of clubs or organizations: Not on file     Relationship status: Not on file    Intimate partner violence:     Fear of current or ex partner: Not on file     Emotionally abused: Not on file     Physically abused: Not on file     Forced sexual activity: Not on file   Other Topics Concern    Not on file   Social History Narrative    Not on file       FAMILY HISTORY:  Family History   Problem Relation Age of Onset    Diabetes Father     Kidney Disease Father     Diabetes Brother     Stroke Mother     Kidney Disease Brother     Other Brother         kidney stones       REVIEW OF SYSTEMS: Complete ROS assessed and noted for that which is described above, all else are negative. Eyes: normal  ENT: normal  CVS: normal  Resp: normal  GI: normal  : normal  GYN: normal  Endocrine: normal  Integument: normal  Musculoskeletal: normal  Neuro: normal  Psych: normal    PHYSICAL EXAMINATION:    VITAL SIGNS:  Visit Vitals  /67 (BP 1 Location: Left arm, BP Patient Position: Sitting)   Pulse 66   Ht 5' 4\" (1.626 m)   Wt 188 lb (85.3 kg)   BMI 32.27 kg/m²       GENERAL: NCAT, Sitting comfortably, NAD  EYES: EOMI, non-icteric, no proptosis  Ear/Nose/Throat: NCAT, no inflammation, no masses  LYMPH NODES: No LAD  CARDIOVASCULAR: S1 S2, RRR, No murmur, 2+ radial pulses  RESPIRATORY: CTA b/l, no wheeze/rales  GASTROINTESTINAL: NT, ND,  MUSCULOSKELETAL: Normal ROM, no atrophy  SKIN: warm, no edema/rash/ or other skin changes  NEUROLOGIC: 5/5 power all extremities, no tremors, AAOx3  PSYCHIATRIC: Normal affect, Normal insight and judgement    REVIEW OF LABORATORY AND RADIOLOGY DATA:   Labs and documentation have been reviewed as described above.      ASSESSMENT AND PLAN:   Jong Rubio is a 71 y.o. female with a PMHx as noted above who presents for f/u evaluation of hypercalcemia. Hypercalcemia  Suspected Primary hyperparathyroidism, mild asymptomatic    Patient has a stable elevated calcium, asymptomatic. We do have positive imaging and this will aid any surgical intervention, however she prefers monitoring of asymptomatic disease. This is reasonable for now, renal function is good and stable, and we will plan as follows:    Plan:   Vit D: Continue 5000 units of Vitamin D3 daily,  Calcium: stable asymptomatic hypercalcemia, off calcium supplement  Labs: BMP/Vit D/PTH before next visit  Imaging: + for Rt lower parathyroid adenoma    Plan for 4 month f/u visit with Torsten HALL  [address] [facility]

## 2020-01-29 NOTE — PATIENT INSTRUCTIONS
Vitamin D3: Take 5000 units once daily with breakfast 
 
Calcium: Holding this for now See you back in 4 months, * Remember to have your blood work collected at the laboratory 3 days before your next visit using the lab sheet provided during your visit. Camden Ring. [address] [facility] [address]

## 2020-05-23 LAB
25(OH)D3+25(OH)D2 SERPL-MCNC: 52.7 NG/ML (ref 30–100)
BUN SERPL-MCNC: 13 MG/DL (ref 8–27)
BUN/CREAT SERPL: 19 (ref 12–28)
CALCIUM SERPL-MCNC: 10.7 MG/DL (ref 8.7–10.3)
CHLORIDE SERPL-SCNC: 103 MMOL/L (ref 96–106)
CO2 SERPL-SCNC: 26 MMOL/L (ref 20–29)
CREAT SERPL-MCNC: 0.68 MG/DL (ref 0.57–1)
GLUCOSE SERPL-MCNC: 166 MG/DL (ref 65–99)
POTASSIUM SERPL-SCNC: 3.6 MMOL/L (ref 3.5–5.2)
PTH-INTACT SERPL-MCNC: 29 PG/ML (ref 15–65)
SODIUM SERPL-SCNC: 143 MMOL/L (ref 134–144)

## 2020-05-29 ENCOUNTER — VIRTUAL VISIT (OUTPATIENT)
Dept: ENDOCRINOLOGY | Age: 70
End: 2020-05-29

## 2020-05-29 DIAGNOSIS — R73.01 IMPAIRED FASTING GLUCOSE: ICD-10-CM

## 2020-05-29 DIAGNOSIS — E83.52 HYPERCALCEMIA: ICD-10-CM

## 2020-05-29 DIAGNOSIS — E21.0 HYPERPARATHYROIDISM, PRIMARY (HCC): Primary | ICD-10-CM

## 2020-05-29 RX ORDER — LOSARTAN POTASSIUM 50 MG/1
50 TABLET ORAL DAILY
COMMUNITY
Start: 2020-05-06 | End: 2021-10-27 | Stop reason: SDUPTHER

## 2020-05-29 RX ORDER — MELATONIN
1000 DAILY
COMMUNITY
End: 2022-05-04

## 2020-05-29 NOTE — PROGRESS NOTES
**DUE TO PANDEMIC AND HEALTH CONCERNS IN THE COMMUNITY, THIS PATIENT WAS EITHER ILL OR FOUND TO BE HIGH RISK FOR IN-PERSON EVALUATION WITHIN THE CLINIC. THE FOLLOWING IS A VIRTUAL VISIT VIA A TELEPHONE ENCOUNTER TO WHICH THE PATIENT AGREED. THE PURPOSE IS TO LIMIT INTERRUPTIONS IN HEALTHCARE AND TO PROVIDE FOR ONGOING URGENT NEEDS UNDER THE CURRENT CONDITIONS. THE PATIENT CONFIRMS THEY ARE AWARE OF THE LIMITATIONS OF THE TELEPHONE VISIT. CHIEF COMPLAINT: f/u suspected primary hyperparathyroidism, asymptomatic     HISTORY OF PRESENT ILLNESS:   Pillo Bhandari is a 79 y.o. female with a PMHx as noted below who presents for f/u evaluation of hypercalcemia. Patient is noted for evaluation of hypercalcemia with Dr. Jasiel Leigh back in 2012. No history of kidney stones. Family history is not significant for hypercalcemia.    3/14/17: DXA with T= - 1.1 at the [facility], forarm not completed  10/31/19: NM Parathyroid Scan: Parathyroid adenoma base of the right thyroid gland  10/31/19: Thyroid US: 1.3 cm nodule, base of Rt lobe, compatible with parathyroid adenoma  Monitored conservatively per her preference, asymptomatic, with vitamin D replacement  She continues on D3 1000 U daily but not taking calcium  No interval kidney stones and urine calcium was evaluated in the past was was normal  Serum calcium remains stable, unchanged at 10.7,   No falls or fractures since last visit,  Feeling well, no recent illness,    Review of most recent bone-related metabolic lab panel:  Lab Results   Component Value Date    PTHILT 29 05/22/2020    PTHILT 31 10/11/2019    PTHILT 38 04/12/2019    [id number] <1.1 08/28/2017    CA 10.7 (H) 05/22/2020    CA 10.7 (H) 01/24/2020    CA 11.0 (H) 10/11/2019    CALU2 144.0 03/13/2017    VITD3 52.7 05/22/2020    VITD3 69.2 01/15/2019    VITD3 68.1 03/22/2018    GFRNA 89 05/22/2020    GFRNA 87 01/24/2020    GFRNA 83 10/11/2019    GFRAA 102 05/22/2020    GFRAA 100 01/24/2020    GFRAA 96 10/11/2019    TSH 2.470 08/28/2017     LAB KEY:  [id number]: PTHrP level  [id number]: Serum NTX level  PTHILT: Intact PTH level  PTHRLT:  PTH-rP  CILT: Ionized Calcium  CALU2: 24 hr urine calcium      PAST MEDICAL/SURGICAL HISTORY:   Past Medical History:   Diagnosis Date    Diabetes ([address]     HTN (hypertension)     Hyperlipemia      Past Surgical History:   Procedure Laterality Date    HX CHOLECYSTECTOMY         ALLERGIES:   No Known Allergies    MEDICATIONS ON ADMISSION:     Current Outpatient Medications:     cholecalciferol (Vitamin D3) (1000 Units /25 mcg) tablet, Take 1,000 Units by mouth daily. , Disp: , Rfl:     losartan (COZAAR) 50 mg tablet, , Disp: , Rfl:     glipiZIDE SR (GLUCOTROL XL) 5 mg CR tablet, daily. , Disp: , Rfl:     metFORMIN (GLUCOPHAGE) 1,000 mg tablet, two (2) times a day., Disp: , Rfl:     latanoprost (XALATAN) 0.005 % ophthalmic solution, , Disp: , Rfl:     aspirin 81 mg chewable tablet, Take 81 mg by mouth daily. , Disp: , Rfl:     amLODIPine (NORVASC) 10 mg tablet, Take  by mouth daily. , Disp: , Rfl:     pravastatin (PRAVACHOL) 40 mg tablet, Take 40 mg by mouth daily. , Disp: , Rfl:     metoprolol (LOPRESSOR) 100 mg tablet, Take  by mouth two (2) times a day., Disp: , Rfl:     calcium carbonate (TUMS) 200 mg calcium (500 mg) chew, Take 1 Tab by mouth Before breakfast and dinner., Disp: 200 Tab, Rfl: 5    ferrous sulfate 325 mg (65 mg iron) tablet, Take 325 mg by mouth., Disp: , Rfl:     OTHER, daily. Calcium citrate + D ( Vit D3 500 units and Calcium 630, Disp: , Rfl:     enalapril (VASOTEC) 20 mg tablet, Take 20 mg by mouth daily. , Disp: , Rfl:     cholecalciferol, VITAMIN D3, (VITAMIN D3) 5,000 unit tab tablet, Take  by mouth daily. , Disp: , Rfl:     cloNIDine HCl (CATAPRES) 0.1 mg tablet, Take  by mouth two (2) times a day., Disp: , Rfl:     SOCIAL HISTORY:   Social History     Socioeconomic History    Marital status:      Spouse name: Not on file    Number of children: Not on file    Years of education: Not on file    Highest education level: Not on file   Occupational History    Not on file   Social Needs    Financial resource strain: Not on file    Food insecurity     Worry: Not on file     Inability: Not on file    Transportation needs     Medical: Not on file     Non-medical: Not on file   Tobacco Use    Smoking status: Never Smoker    Smokeless tobacco: Never Used   Substance and Sexual Activity    Alcohol use: No    Drug use: No    Sexual activity: Not on file   Lifestyle    Physical activity     Days per week: Not on file     Minutes per session: Not on file    Stress: Not on file   Relationships    Social connections     Talks on phone: Not on file     Gets together: Not on file     Attends Denominational service: Not on file     Active member of club or organization: Not on file     Attends meetings of clubs or organizations: Not on file     Relationship status: Not on file    Intimate partner violence     Fear of current or ex partner: Not on file     Emotionally abused: Not on file     Physically abused: Not on file     Forced sexual activity: Not on file   Other Topics Concern    Not on file   Social History Narrative    Not on file       FAMILY HISTORY:  Family History   Problem Relation Age of Onset    Diabetes Father     Kidney Disease Father     Diabetes Brother     Stroke Mother     Kidney Disease Brother     Other Brother         kidney stones       REVIEW OF SYSTEMS: Complete ROS assessed and noted for that which is described above, all else are negative. Eyes: normal  ENT: normal  CVS: normal  Resp: normal  GI: normal  : normal  GYN: normal  Endocrine: normal  Integument: normal  Musculoskeletal: normal  Neuro: normal  Psych: normal    PHYSICAL EXAMINATION:  Telephone Visit    REVIEW OF LABORATORY AND RADIOLOGY DATA:   Labs and documentation have been reviewed as described above.      ASSESSMENT AND PLAN:   Tejal Barrientos is a 79 y.o. female with a PMHx as noted above who presents for f/u evaluation of hypercalcemia. Hypercalcemia  Suspected Primary hyperparathyroidism, mild asymptomatic    Patient has a stable elevated calcium, asymptomatic. We do have positive imaging and this will aid any surgical intervention, however she continues to prefer monitoring of asymptomatic disease. And this remains reasonable approach noting her calcium and PTH level have been stable. Plan:   Vit D: Continue 1000 units of Vitamin D3 daily,  Calcium: stable asymptomatic hypercalcemia, off calcium supplement  Labs: BMP/PTH before next visit  Imaging: + for Rt lower parathyroid adenoma    Plan for 4 month f/u visit with pre-labs,    25 minutes spent toward telephone visit today of which >50% of this time was spent in counseling and coordination of care. Emigdio Gamboa.  [facility]

## 2020-10-02 LAB
BUN SERPL-MCNC: 15 MG/DL (ref 8–27)
BUN/CREAT SERPL: 21 (ref 12–28)
CALCIUM SERPL-MCNC: 11.1 MG/DL (ref 8.7–10.3)
CHLORIDE SERPL-SCNC: 105 MMOL/L (ref 96–106)
CO2 SERPL-SCNC: 26 MMOL/L (ref 20–29)
CREAT SERPL-MCNC: 0.73 MG/DL (ref 0.57–1)
GLUCOSE SERPL-MCNC: 137 MG/DL (ref 65–99)
POTASSIUM SERPL-SCNC: 3.8 MMOL/L (ref 3.5–5.2)
PTH-INTACT SERPL-MCNC: 28 PG/ML (ref 15–65)
SODIUM SERPL-SCNC: 145 MMOL/L (ref 134–144)

## 2020-10-06 ENCOUNTER — VIRTUAL VISIT (OUTPATIENT)
Dept: ENDOCRINOLOGY | Age: 70
End: 2020-10-06
Payer: MEDICARE

## 2020-10-06 DIAGNOSIS — E83.52 HYPERCALCEMIA: ICD-10-CM

## 2020-10-06 DIAGNOSIS — E21.0 HYPERPARATHYROIDISM, PRIMARY (HCC): Primary | ICD-10-CM

## 2020-10-06 PROCEDURE — 99443 PR PHYS/QHP TELEPHONE EVALUATION 21-30 MIN: CPT | Performed by: INTERNAL MEDICINE

## 2020-10-06 RX ORDER — MELOXICAM 15 MG/1
TABLET ORAL
COMMUNITY
Start: 2020-10-05 | End: 2022-05-04

## 2020-10-06 RX ORDER — DICLOFENAC SODIUM 10 MG/G
2 GEL TOPICAL 2 TIMES DAILY
COMMUNITY
Start: 2020-10-05 | End: 2022-06-28

## 2020-10-06 NOTE — PROGRESS NOTES
**DUE TO PANDEMIC AND HEALTH CONCERNS IN THE COMMUNITY, THIS PATIENT WAS EITHER ILL OR FOUND TO BE HIGH RISK FOR IN-PERSON EVALUATION WITHIN THE CLINIC. THE FOLLOWING IS A VIRTUAL VISIT VIA A TELEPHONE ENCOUNTER TO WHICH THE PATIENT AGREED. THE PURPOSE IS TO LIMIT INTERRUPTIONS IN HEALTHCARE AND TO PROVIDE FOR ONGOING URGENT NEEDS UNDER THE CURRENT CONDITIONS. THE PATIENT CONFIRMS THEY ARE AWARE OF THE LIMITATIONS OF THE TELEPHONE VISIT. CHIEF COMPLAINT: f/u suspected primary hyperparathyroidism, asymptomatic     HISTORY OF PRESENT ILLNESS:   Luzma Coronel is a 79 y.o. female with a PMHx as noted below who presents for f/u evaluation of hypercalcemia. Patient is noted for evaluation of hypercalcemia with Dr. Jose Alfredo rome in 2012. No history of kidney stones. Family history is not significant for hypercalcemia.    3/14/17: DXA with T= - 1.1 at the [facility], forarm not completed  10/31/19: NM Parathyroid Scan: Parathyroid adenoma base of the right thyroid gland  10/31/19: Thyroid US: 1.3 cm nodule, base of Rt lobe, compatible with parathyroid adenoma  Monitored conservatively per her preference, asymptomatic, with vitamin D replacement  She continues on D3 1000 U daily but not taking calcium  No interval kidney stones and urine calcium was evaluated in the past was was normal  Serum calcium did rise to 11.1 recently,   No falls or fractures since last visit,  No kidney stones,   PTH and Vit D remain stable,     Review of most recent bone-related metabolic lab panel:  Lab Results   Component Value Date    PTHILT 28 10/01/2020    PTHILT 29 05/22/2020    PTHILT 31 10/11/2019    [id number] <1.1 08/28/2017    CA 11.1 (H) 10/01/2020    CA 10.7 (H) 05/22/2020    CA 10.7 (H) 01/24/2020    CALU2 144.0 03/13/2017    VITD3 52.7 05/22/2020    VITD3 69.2 01/15/2019    VITD3 68.1 03/22/2018    GFRNA 84 10/01/2020    GFRNA 89 05/22/2020    GFRNA 87 01/24/2020    GFRAA 96 10/01/2020    GFRAA 102 05/22/2020    GFRAA 100 01/24/2020 TSH 2.470 08/28/2017     LAB KEY:  [id number]: PTHrP level  [id number]: Serum NTX level  PTHILT: Intact PTH level  PTHRLT:  PTH-rP  CILT: Ionized Calcium  CALU2: 24 hr urine calcium      PAST MEDICAL/SURGICAL HISTORY:   Past Medical History:   Diagnosis Date    Diabetes ([address]     HTN (hypertension)     Hyperlipemia      Past Surgical History:   Procedure Laterality Date    HX CHOLECYSTECTOMY         ALLERGIES:   No Known Allergies    MEDICATIONS ON ADMISSION:     Current Outpatient Medications:     cholecalciferol (Vitamin D3) (1000 Units /25 mcg) tablet, Take 1,000 Units by mouth daily. , Disp: , Rfl:     losartan (COZAAR) 50 mg tablet, 50 mg daily. , Disp: , Rfl:     calcium carbonate (TUMS) 200 mg calcium (500 mg) chew, Take 1 Tab by mouth Before breakfast and dinner., Disp: 200 Tab, Rfl: 5    ferrous sulfate 325 mg (65 mg iron) tablet, Take 325 mg by mouth., Disp: , Rfl:     glipiZIDE SR (GLUCOTROL XL) 5 mg CR tablet, daily. , Disp: , Rfl:     metFORMIN (GLUCOPHAGE) 1,000 mg tablet, two (2) times a day., Disp: , Rfl:     latanoprost (XALATAN) 0.005 % ophthalmic solution, Administer 1 Drop to both eyes nightly., Disp: , Rfl:     OTHER, daily. Calcium citrate + D ( Vit D3 500 units and Calcium 630, Disp: , Rfl:     aspirin 81 mg chewable tablet, Take 81 mg by mouth every other day., Disp: , Rfl:     enalapril (VASOTEC) 20 mg tablet, Take 20 mg by mouth daily. , Disp: , Rfl:     amLODIPine (NORVASC) 10 mg tablet, Take  by mouth daily. , Disp: , Rfl:     cloNIDine HCl (CATAPRES) 0.1 mg tablet, Take  by mouth two (2) times a day., Disp: , Rfl:     pravastatin (PRAVACHOL) 40 mg tablet, Take 40 mg by mouth daily. , Disp: , Rfl:     metoprolol (LOPRESSOR) 100 mg tablet, Take  by mouth two (2) times a day., Disp: , Rfl:     diclofenac (VOLTAREN) 1 % gel, , Disp: , Rfl:     meloxicam (MOBIC) 15 mg tablet, , Disp: , Rfl:     cholecalciferol, VITAMIN D3, (VITAMIN D3) 5,000 unit tab tablet, Take  by mouth daily., Disp: , Rfl:     SOCIAL HISTORY:   Social History     Socioeconomic History    Marital status:      Spouse name: Not on file    Number of children: Not on file    Years of education: Not on file    Highest education level: Not on file   Occupational History    Not on file   Social Needs    Financial resource strain: Not on file    Food insecurity     Worry: Not on file     Inability: Not on file    Transportation needs     Medical: Not on file     Non-medical: Not on file   Tobacco Use    Smoking status: Never Smoker    Smokeless tobacco: Never Used   Substance and Sexual Activity    Alcohol use: No    Drug use: No    Sexual activity: Not on file   Lifestyle    Physical activity     Days per week: Not on file     Minutes per session: Not on file    Stress: Not on file   Relationships    Social connections     Talks on phone: Not on file     Gets together: Not on file     Attends Druze service: Not on file     Active member of club or organization: Not on file     Attends meetings of clubs or organizations: Not on file     Relationship status: Not on file    Intimate partner violence     Fear of current or ex partner: Not on file     Emotionally abused: Not on file     Physically abused: Not on file     Forced sexual activity: Not on file   Other Topics Concern    Not on file   Social History Narrative    Not on file       FAMILY HISTORY:  Family History   Problem Relation Age of Onset    Diabetes Father     Kidney Disease Father     Diabetes Brother     Stroke Mother     Kidney Disease Brother     Other Brother         kidney stones       REVIEW OF SYSTEMS: Complete ROS assessed and noted for that which is described above, all else are negative.   Eyes: normal  ENT: normal  CVS: normal  Resp: normal  GI: normal  : normal  GYN: normal  Endocrine: normal  Integument: normal  Musculoskeletal: normal  Neuro: normal  Psych: normal    PHYSICAL EXAMINATION:  Telephone Visit    REVIEW OF LABORATORY AND RADIOLOGY DATA:   Labs and documentation have been reviewed as described above. ASSESSMENT AND PLAN:   Nicole Mejia is a 79 y.o. female with a PMHx as noted above who presents for f/u evaluation of hypercalcemia. Hypercalcemia  Suspected Primary hyperparathyroidism, mild asymptomatic    Calcium has climbed up beyond the mild range where she had previously preferred to monitor asymptomatic disease. She remains asymptomatic and feeling well however. I discussed the notion today with her regarding seeing a surgeon and the result of our discussion was such that we will repeat her calcium level in 4 weeks and if remains elevated we will refer her to the surgeon at that time. She is encouraged to stay adequately hydrated with water. Plan:   Vit D: Continue 1000 units of Vitamin D3 daily,  Calcium: asymptomatic hypercalcemia, off calcium supplement  Labs: BMP in 4 weeks as planned, will consider if still high = surgery visit  Imaging: + for Rt lower parathyroid adenoma    Plan for 4 month, Feb 15 at 2:50,    25 minutes spent toward telephone visit today of which >50% of this time was spent in counseling and coordination of care. Marie Livingston.  [address] [facility]

## 2020-10-30 ENCOUNTER — TELEPHONE (OUTPATIENT)
Dept: ENDOCRINOLOGY | Age: 70
End: 2020-10-30

## 2020-10-30 DIAGNOSIS — E21.0 HYPERPARATHYROIDISM, PRIMARY (HCC): Primary | ICD-10-CM

## 2020-10-30 LAB
BUN SERPL-MCNC: 17 MG/DL (ref 8–27)
BUN/CREAT SERPL: 19 (ref 12–28)
CALCIUM SERPL-MCNC: 11.2 MG/DL (ref 8.7–10.3)
CHLORIDE SERPL-SCNC: 106 MMOL/L (ref 96–106)
CO2 SERPL-SCNC: 25 MMOL/L (ref 20–29)
CREAT SERPL-MCNC: 0.88 MG/DL (ref 0.57–1)
GLUCOSE SERPL-MCNC: 114 MG/DL (ref 65–99)
POTASSIUM SERPL-SCNC: 4.8 MMOL/L (ref 3.5–5.2)
SODIUM SERPL-SCNC: 144 MMOL/L (ref 134–144)

## 2020-10-30 NOTE — TELEPHONE ENCOUNTER
Jerson Rizzo,     Patients calcium remains elevated, she should continue to stay hydrated with water, and I will refer her to the surgery clinic for evaluation. She can go ahead and call [phone] and advise them that she would like to set up an appt. Romana Ellis.  [facility]  [address] [place] [address]

## 2020-11-11 ENCOUNTER — TELEPHONE (OUTPATIENT)
Dept: ENDOCRINOLOGY | Age: 70
End: 2020-11-11

## 2020-11-11 DIAGNOSIS — E83.52 HYPERCALCEMIA: Primary | ICD-10-CM

## 2020-11-11 NOTE — TELEPHONE ENCOUNTER
----- Message from Bret Alves sent at 11/11/2020  9:52 AM EST -----  Regarding: /Telephone  General Message/Vendor Calls    Caller's first and last name:Fifi Shearer      Reason for call:lab results       Callback required yes/no and why:yes      Best contact number(s):[phone]      Details to clarify the request:  Pt requesting lab results from October 29 in regards to if pt needs to schedule thyroid surgery before the end of the year .     Bret Alves

## 2020-11-11 NOTE — TELEPHONE ENCOUNTER
----- Message from Roz Grider sent at 11/11/2020  2:26 PM EST -----  Regarding: Dr Babs León  Pt needs to speak to the doctor regarding her Calcium being high, thinks it is coming from her Thyroid, please call pt at [phone] or [phone]

## 2020-11-11 NOTE — TELEPHONE ENCOUNTER
I returned this call and spoke with Mrs and Mr Jefry Donato. I relayed the message from Dr. Nilda Martin. They both seemed to understand this information and said they would do as instructed.   Pepper Marie

## 2020-11-11 NOTE — TELEPHONE ENCOUNTER
Gerri Watkins,    Her calcium was elevated >11 previously and I referred her to a surgeon and we provided her with the number to call and schedule an appt with them, see recent telephone notes. This is not a thyroid problem but is usually a parathyroid problem and we do have positive imaging of a suspect parathyroid lesion. It does not appear she has scheduled this appt with them yet. 1. She would need to set up an appt. since her calcium remains very high. 2. In the meantime she can complete a 24 hour urine collection for calcium to evaluate her risk of kidney stone and exclude other possibilities. To  the jug from the lab, and then to collect all urine in that jug for a 24 hr period and submit to the lab when completed. If not submitting the following day, then should refrigerate until taking back to the lab. Thanks,     Steven Best.  [facility]  [address] [place] [address]

## 2020-11-11 NOTE — TELEPHONE ENCOUNTER
----- Message from David Sanchez sent at 11/11/2020  9:52 AM EST -----  Regarding: /Telephone  General Message/Vendor Calls     Caller's first and last name:Fifi Shearer        Reason for call:lab results         Callback required yes/no and why:yes        Best contact number(s):[phone]        Details to clarify the request:  Pt requesting lab results from October 29 in regards to if pt needs to schedule thyroid surgery before the end of the year .     David Sanchez

## 2020-11-11 NOTE — TELEPHONE ENCOUNTER
Spoke to Mr. And Mrs. Shearer and informed them of the message per Dr. Licona Mems.  Both understood what was told to them

## 2020-12-07 ENCOUNTER — OFFICE VISIT (OUTPATIENT)
Dept: SURGERY | Age: 70
End: 2020-12-07
Payer: MEDICARE

## 2020-12-07 VITALS
WEIGHT: 185.5 LBS | RESPIRATION RATE: 18 BRPM | OXYGEN SATURATION: 98 % | HEART RATE: 70 BPM | TEMPERATURE: 96.8 F | DIASTOLIC BLOOD PRESSURE: 70 MMHG | SYSTOLIC BLOOD PRESSURE: 133 MMHG | HEIGHT: 64 IN | BODY MASS INDEX: 31.67 KG/M2

## 2020-12-07 DIAGNOSIS — E21.0 PRIMARY HYPERPARATHYROIDISM (HCC): Primary | ICD-10-CM

## 2020-12-07 PROCEDURE — G8417 CALC BMI ABV UP PARAM F/U: HCPCS | Performed by: SURGERY

## 2020-12-07 PROCEDURE — 1101F PT FALLS ASSESS-DOCD LE1/YR: CPT | Performed by: SURGERY

## 2020-12-07 PROCEDURE — G8510 SCR DEP NEG, NO PLAN REQD: HCPCS | Performed by: SURGERY

## 2020-12-07 PROCEDURE — G8399 PT W/DXA RESULTS DOCUMENT: HCPCS | Performed by: SURGERY

## 2020-12-07 PROCEDURE — 1090F PRES/ABSN URINE INCON ASSESS: CPT | Performed by: SURGERY

## 2020-12-07 PROCEDURE — G8427 DOCREV CUR MEDS BY ELIG CLIN: HCPCS | Performed by: SURGERY

## 2020-12-07 PROCEDURE — 99204 OFFICE O/P NEW MOD 45 MIN: CPT | Performed by: SURGERY

## 2020-12-07 PROCEDURE — 3017F COLORECTAL CA SCREEN DOC REV: CPT | Performed by: SURGERY

## 2020-12-07 PROCEDURE — G8536 NO DOC ELDER MAL SCRN: HCPCS | Performed by: SURGERY

## 2020-12-07 RX ORDER — BLOOD-GLUCOSE METER
EACH MISCELLANEOUS
COMMUNITY
Start: 2020-09-18

## 2020-12-07 RX ORDER — MULTIVITAMIN
TABLET,CHEWABLE ORAL
COMMUNITY
Start: 2020-09-16 | End: 2022-05-04

## 2020-12-07 RX ORDER — BLOOD SUGAR DIAGNOSTIC
STRIP MISCELLANEOUS
COMMUNITY
Start: 2020-09-18 | End: 2022-08-30 | Stop reason: SDUPTHER

## 2020-12-07 NOTE — PROGRESS NOTES
HISTORY OF PRESENT ILLNESS  Rich Sepulveda is a 79 y.o. female who comes in for consultation by Olegario Duron DO and Dr Christi Anguiano for hyperparathyroidism  HPI  She has been noted to have an elevated calcium for over 8 years. Recently it has been over 11. PTH was normal.   She had an US and NM parathyroid scan suggesting a 1.3 cm right inferior parathyroid consistent with adenoma. She denies hx kidney stones, fractures, abdominal pain, depression although she does have some fatigue and memory loss. Past Medical History:   Diagnosis Date    Arthritis     Diabetes ([address]     HTN (hypertension)     Hyperlipemia      Past Surgical History:   Procedure Laterality Date    HX CHOLECYSTECTOMY      HX ORTHOPAEDIC      lumbar discectomy      Family History   Problem Relation Age of Onset    Diabetes Father     Kidney Disease Father     Diabetes Brother     Cancer Brother     Stroke Mother     Kidney Disease Brother     Cancer Brother     Other Brother         kidney stones    Cancer Sister     Cancer Sister      Social History     Tobacco Use    Smoking status: Never Smoker    Smokeless tobacco: Never Used   Substance Use Topics    Alcohol use: No    Drug use: No     Current Outpatient Medications   Medication Sig    ReliOn Prime Meter misc USE TO CHECK BLOOD SUGAR ONCE DAILY    ReliOn Prime Test Strips strip USE TO CHECK BLOOD SUGAR ONCE DAILY    Alcohol Prep Pads padm     diclofenac (VOLTAREN) 1 % gel     cholecalciferol (Vitamin D3) (1000 Units /25 mcg) tablet Take 1,000 Units by mouth daily.  losartan (COZAAR) 50 mg tablet 50 mg daily.  ferrous sulfate 325 mg (65 mg iron) tablet Take 325 mg by mouth.  glipiZIDE SR (GLUCOTROL XL) 5 mg CR tablet daily.  metFORMIN (GLUCOPHAGE) 1,000 mg tablet two (2) times a day.  latanoprost (XALATAN) 0.005 % ophthalmic solution Administer 1 Drop to both eyes nightly.  aspirin 81 mg chewable tablet Take 81 mg by mouth every other day.     amLODIPine (NORVASC) 10 mg tablet Take  by mouth daily.  cloNIDine HCl (CATAPRES) 0.1 mg tablet Take  by mouth two (2) times a day.  pravastatin (PRAVACHOL) 40 mg tablet Take 40 mg by mouth daily.  metoprolol (LOPRESSOR) 100 mg tablet Take  by mouth two (2) times a day.  meloxicam (MOBIC) 15 mg tablet     calcium carbonate (TUMS) 200 mg calcium (500 mg) chew Take 1 Tab by mouth Before breakfast and dinner. (Patient not taking: Reported on 12/7/2020)    OTHER daily. Calcium citrate + D ( Vit D3 500 units and Calcium 630    enalapril (VASOTEC) 20 mg tablet Take 20 mg by mouth daily.  cholecalciferol, VITAMIN D3, (VITAMIN D3) 5,000 unit tab tablet Take  by mouth daily. No current facility-administered medications for this visit. Allergies   Allergen Reactions    Enalapril Swelling       Review of Systems   Constitutional: Negative for chills, diaphoresis, fever and weight loss. HENT: Negative for sore throat. Eyes: Negative for blurred vision and discharge. Respiratory: Negative for cough, shortness of breath and wheezing. Cardiovascular: Negative for chest pain, palpitations, orthopnea, claudication and leg swelling. Gastrointestinal: Negative for abdominal pain, constipation, diarrhea, heartburn, melena, nausea and vomiting. Genitourinary: Negative for dysuria, flank pain, frequency and hematuria. Musculoskeletal: Negative for back pain, joint pain, myalgias and neck pain. Skin: Negative for rash. Neurological: Negative for dizziness, speech change, focal weakness, seizures, loss of consciousness, weakness and headaches. Endo/Heme/Allergies: Bruises/bleeds easily. Psychiatric/Behavioral: Negative for depression and memory loss.      Visit Vitals  /70 (BP 1 Location: Left arm, BP Patient Position: Sitting)   Pulse 70   Temp 96.8 °F (36 °C) (Oral)   Resp 18   Ht 5' 4\" (1.626 m)   Wt 84.1 kg (185 lb 8 oz)   SpO2 98%   BMI 31.84 kg/m²       Physical Exam  Constitutional:       General: She is not in acute distress. Appearance: She is well-developed. She is not diaphoretic. HENT:      Head: Normocephalic and atraumatic. Mouth/Throat:      Pharynx: No oropharyngeal exudate. Eyes:      General: No scleral icterus. Conjunctiva/sclera: Conjunctivae normal.      Pupils: Pupils are equal, round, and reactive to light. Neck:      Musculoskeletal: Normal range of motion and neck supple. Thyroid: No thyromegaly. Vascular: No JVD. Trachea: No tracheal deviation. Cardiovascular:      Rate and Rhythm: Normal rate and regular rhythm. Heart sounds: No murmur. No friction rub. No gallop. Pulmonary:      Effort: Pulmonary effort is normal. No respiratory distress. Breath sounds: Normal breath sounds. No wheezing or rales. Abdominal:      General: Bowel sounds are normal. There is no distension. Palpations: Abdomen is soft. There is no mass. Tenderness: There is no abdominal tenderness. There is no guarding or rebound. Musculoskeletal: Normal range of motion. Lymphadenopathy:      Cervical: No cervical adenopathy. Skin:     General: Skin is warm and dry. Coloration: Skin is not pale. Findings: No erythema or rash. Neurological:      Mental Status: She is alert and oriented to person, place, and time. Cranial Nerves: No cranial nerve deficit. Psychiatric:         Behavior: Behavior normal.         Thought Content: Thought content normal.         Judgment: Judgment normal.         ASSESSMENT and PLAN  1. Primary hyperparathyroidism. I explained to her and her  about the anatomy and pathophysiology of the process. I explained about risks of hypercalcemia and bone loss.   I discussed parathyroidectomy and risks including, but not limited to, bleeding, infection, hypoparathyroidism, recurrent laryngeal nerve injury, need for partial/thyroidectomy, DVT/PE, cardiac/CNS/pulmonary/Urinary complications and [id number].    2.  Essential hypertension. Stable on rx  3. NIDDM type 2. Controlled on rx  4. Anemia. On supplemental iron    She is interested in parathyroidectomy under general anesthesia as an outpatient with an overnight stay    She and her  are extremely anxious about Covid-19 and the recent increase in cases    The patient was counseled at length about the risks of cesar Covid-19 during their perioperative period and any recovery window from their procedure. The patient was made aware that cesar Covid-19  may worsen their prognosis for recovering from their procedure and lend to a higher morbidity and/or mortality risk. All material risks, benefits, and reasonable alternatives including postponing the procedure were discussed. The patient does not  wish to proceed with the procedure at this time.       They will return in 2 months and consider surgery at that time    Hank Caal MD FACS        RTC 2 months    Hank Caal MD FACS

## 2020-12-07 NOTE — PATIENT INSTRUCTIONS
Parathyroidectomy: Before Your Surgery  What is a parathyroidectomy? Parathyroidectomy is surgery to remove one or more of the four parathyroid glands in the neck. These small glands help control the amount of calcium in the body. They are found on the back of the thyroid gland. When they are too active, these glands cause high levels of calcium. This is called hyperparathyroidism (say \"hy-per-pair-td-ZTW-zybv-iz-um\"). The glands also are removed if they contain cancer. The doctor will take out the gland or glands through a cut in the front of your neck. This cut is called an incision. You may have a tube in your neck for 1 to 4 days. The tube drains fluid from the incision. You may be able to go back to work or your normal routine after a few weeks. This depends on the kind of work you do and how you feel. Follow-up care is a key part of your treatment and safety. Be sure to make and go to all appointments, and call your doctor if you are having problems. It's also a good idea to know your test results and keep a list of the medicines you take. How do you prepare for surgery? Surgery can be stressful. This information will help you understand what you can expect. And it will help you safely prepare for surgery. Preparing for surgery    · Be sure you have someone to take you home. Anesthesia and pain medicine will make it unsafe for you to drive or get home on your own.     · Understand exactly what surgery is planned, along with the risks, benefits, and other options.     · If you take aspirin or some other blood thinner, ask your doctor if you should stop taking it before your surgery. Make sure that you understand exactly what your doctor wants you to do. These medicines increase the risk of bleeding.     · Tell your doctor ALL the medicines, vitamins, supplements, and herbal remedies you take. Some may increase the risk of problems during your surgery.  Your doctor will tell you if you should stop taking any of them before the surgery and how soon to do it.     · Make sure your doctor and the hospital have a copy of your advance directive. If you don't have one, you may want to prepare one. It lets others know your health care wishes. It's a good thing to have before any type of surgery or procedure. What happens on the day of surgery? · Follow the instructions exactly about when to stop eating and drinking. If you don't, your surgery may be canceled. If your doctor told you to take your medicines on the day of surgery, take them with only a sip of water.     · Take a bath or shower before you come in for your surgery. Do not apply lotions, perfumes, deodorants, or nail polish.     · Do not shave the surgical site yourself.     · Take off all jewelry and piercings. And take out contact lenses, if you wear them. At the hospital or surgery center   · Bring a picture ID.     · You will be kept comfortable and safe by your anesthesia provider. You will be asleep during the surgery.     · The surgery may take several hours, depending on how complex it is. When should you call your doctor? · You have questions or concerns.     · You don't understand how to prepare for your surgery.     · You become ill before the surgery (such as fever, flu, or a cold).     · You need to reschedule or have changed your mind about having the surgery. Where can you learn more? Go to http://www.gray.com/  Enter L446 in the search box to learn more about \"Parathyroidectomy: Before Your Surgery. \"  Current as of: March 31, 2020               Content Version: 12.6  © 5143-0465 [employer]. Care instructions adapted under license by [employer] (which disclaims liability or warranty for this information).  If you have questions about a medical condition or this instruction, always ask your healthcare professional. [address] any warranty or liability for your use of this information.

## 2020-12-07 NOTE — LETTER
12/7/20 Patient: Sahara Herring YOB: 1950 Date of Visit: 12/7/2020 Karol Litten, DO 
[address] [place] [address] VIA Facsimile: [phone] Vinnie Jack MD 
[address] [place] [address] [address] [facility] VIA In Basket Dear Karol Litten, DO Elner Cullen, MD, Thank you for referring Ms. Sahara Herring to  [facility] for evaluation. My notes for this consultation are attached. If you have questions, please do not hesitate to call me. I look forward to following your patient along with you.  
 
 
Sincerely, 
 
Blanca Kirk MD

## 2020-12-07 NOTE — PROGRESS NOTES
Chief Complaint   Patient presents with    Thyroid Problem     Seen at the request of [place] hyperparathyroidism with hypercalcemia.

## 2020-12-11 ENCOUNTER — TELEPHONE (OUTPATIENT)
Dept: ENDOCRINOLOGY | Age: 70
End: 2020-12-11

## 2020-12-11 NOTE — TELEPHONE ENCOUNTER
I returned this call and spoke with Mr. Jefry Donato. He said that they went to see the surgeon and he said there may be a pill that she could take. I looked at the note from him and it looks like they were talking about surgery but Mr. Jefry Donato said that she doesn't really want to do this. I also asked him to go to [facility] and pickup the 24 hour urine jug and do that because that would be helpful in trying to answer some questions about what to do next. He said they would do that.   Pepper Marie

## 2020-12-11 NOTE — TELEPHONE ENCOUNTER
----- Message from Erasmo Quiroga sent at 12/11/2020  9:45 AM EST -----  Regarding: Dr. Sruthi Adams General Message/Vendor Calls    Caller's first and last name: Tito Howe ()      Reason for call: Requesting call back regarding Rx options.        Call back required yes/no and why: Yes       Best contact number(s): [phone] or (c) [phone] or [phone]      Details to clarify the request:      Erasmo Quiroga

## 2020-12-16 LAB
CALCIUM 24H UR-MCNC: 9 MG/DL
CALCIUM 24H UR-MRATE: 45 MG/24 HR (ref 47–462)
CREAT 24H UR-MRATE: 916 MG/24 HR (ref 800–1800)
CREAT UR-MCNC: 183.2 MG/DL

## 2020-12-16 RX ORDER — CALCIUM CARBONATE 200(500)MG
1 TABLET,CHEWABLE ORAL
Qty: 200 TAB | Refills: 5 | Status: SHIPPED | OUTPATIENT
Start: 2020-12-16 | End: 2022-05-04

## 2020-12-16 NOTE — TELEPHONE ENCOUNTER
Chance Zambrano,   Patients urine calcium was low, although it was not a full collection since they reported only 500 ml collected. I believe she should start calcium supplement at 500mg with breakfast and dinner each day. Although her calcium is high in the blood, she is somewhat calcium deficient with regards to total body calcium, and will benefit from supplement.    She can plan to follow up with Dr. Reyes Gust as per their surgical plans,

## 2020-12-17 ENCOUNTER — TELEPHONE (OUTPATIENT)
Dept: ENDOCRINOLOGY | Age: 70
End: 2020-12-17

## 2020-12-17 NOTE — TELEPHONE ENCOUNTER
I called Ms. Shearer and relayed the message from Dr. Kendra Wilson. I spoke with both her and her  and they both seemed to understand this information and will do as instructed.   Kwabena Stanley

## 2020-12-29 ENCOUNTER — TELEPHONE (OUTPATIENT)
Dept: ENDOCRINOLOGY | Age: 70
End: 2020-12-29

## 2020-12-29 NOTE — TELEPHONE ENCOUNTER
----- Message from Maria R Mosley sent at 12/29/2020 11:56 AM EST -----  Regarding: Dr. Rodríguez De Jesus General Message/Vendor Calls    Caller's first and last name:      Reason for call: Regarding she hasn't received her thyroid Rx through [facility].       Call back required yes/no and why: Yes       Best contact number(s): [phone]      Details to clarify the request:      Maria R Mosley

## 2020-12-30 ENCOUNTER — TRANSCRIBE ORDER (OUTPATIENT)
Dept: ENDOCRINOLOGY | Age: 70
End: 2020-12-30

## 2020-12-30 ENCOUNTER — TELEPHONE (OUTPATIENT)
Dept: ENDOCRINOLOGY | Age: 70
End: 2020-12-30

## 2020-12-30 NOTE — TELEPHONE ENCOUNTER
Spoke to Mrs. Shearer and informed her that she can buy the Tums over the counter at her local store. Patient understood with no further questions.

## 2020-12-30 NOTE — TELEPHONE ENCOUNTER
Patient states has not received a rx from [facility] that  sent in a few weeks ago. Advised that TUMS was sent on 12/16/2020. Advised to call [facility] to see why she has not received the prescription yet. Patient expressed understanding.

## 2020-12-30 NOTE — TELEPHONE ENCOUNTER
----- Message from Brant Gleason sent at 12/30/2020  9:43 AM EST -----  Regarding: [place]/Telephone  Env General Message/Vendor Calls    Caller's first and last name: Antoinette Ospina ()      Reason for call: Regarding pt hasn't received mail order for calcium carbonate (TUMS) 200 mg calcium (500 mg) chew, stated pharmacy stated they needed to contact the Dr.       Call back required yes/no and why: Yes       Best contact number(s): [phone]      Details to clarify the request: Stated it has been 16 days.        Brant Gleason

## 2020-12-30 NOTE — TELEPHONE ENCOUNTER
Spoke to Mr. And Mrs. Shearer and informed them both that Dr. Nilda Martin sent the prescription to [address] [place] [address] on 12/16/2020 and that we received confirmation at 4:48 pm that same day that the pharmacy received the prescription via electronically. Mr. And Mrs. Shearer stated that they will call [facility] and will contact us if there is anything that we need to do from our end.

## 2020-12-30 NOTE — TELEPHONE ENCOUNTER
----- Message from Christopher Victoria sent at 12/30/2020 12:40 PM EST -----  Regarding: Dr. Shivani Petersen General Message/Vendor Calls    Caller's first and last name: Bc Ramachandran ()      Reason for call: Regarding pt's Rx Calcium Bonate isn't covered by her ins.        Call back required yes/no and why: Yes       Best contact number(s): [phone]      Details to clarify the request:      Christopher Victoria

## 2021-01-07 ENCOUNTER — TRANSCRIBE ORDER (OUTPATIENT)
Dept: ENDOCRINOLOGY | Age: 71
End: 2021-01-07

## 2021-01-07 ENCOUNTER — TELEPHONE (OUTPATIENT)
Dept: ENDOCRINOLOGY | Age: 71
End: 2021-01-07

## 2021-01-07 NOTE — TELEPHONE ENCOUNTER
I returned this call and Ms. Shearer just wanted to know how many times a day she was supposed to take the tums. I let her know that she was supposed to take one before breakfast and one before dinner. She understood this information and will do as instructed.   Ousmane Salvador

## 2021-01-07 NOTE — TELEPHONE ENCOUNTER
----- Message from Jose Hernandez sent at 1/7/2021  1:45 PM EST -----  Regarding: Dr. Celine Salgado General Message/Vendor Calls    Caller's first and last name: Cady Coleman ()      Reason for call: Requesting call back from [facility]. regarding his wife's Rx calcium carbonate (TUMS) 200 mg calcium (500 mg) chew.       Call back required yes/no and why: Yes       Best contact number(s): [phone] or  [phone]      Details to clarify the request:      Jose Hernandez

## 2021-02-01 ENCOUNTER — TELEPHONE (OUTPATIENT)
Dept: ENDOCRINOLOGY | Age: 71
End: 2021-02-01

## 2021-02-01 DIAGNOSIS — E55.9 VITAMIN D DEFICIENCY: ICD-10-CM

## 2021-02-01 DIAGNOSIS — E21.0 HYPERPARATHYROIDISM, PRIMARY (HCC): Primary | ICD-10-CM

## 2021-02-01 DIAGNOSIS — E83.52 HYPERCALCEMIA: ICD-10-CM

## 2021-02-01 NOTE — TELEPHONE ENCOUNTER
----- Message from Jeevan Irizarry sent at 2/1/2021 10:24 AM EST -----  Regarding: Dr. Katerina Craft: [phone]  General Message/Vendor Calls    Caller's first and last name:Pt      Reason for call: Patient would like a call back to know if she has to get blood/lab work done before her 2/15/21 appointment. If yes, please sent the paperwork to [facility] so the patient can schedule. Callback required yes/no and why:Yes, confirm.        Best contact number(s):[phone]      Details to clarify the request:n/a      Jeevan Irizarry

## 2021-02-02 NOTE — TELEPHONE ENCOUNTER
I returned this call and let Ms. Shearer know that Dr. Bentley Godwin ordered the lab work and she could go to any [facility] to have this done.   Alem Perez

## 2021-02-08 ENCOUNTER — OFFICE VISIT (OUTPATIENT)
Dept: SURGERY | Age: 71
End: 2021-02-08
Payer: MEDICARE

## 2021-02-08 VITALS
BODY MASS INDEX: 30.15 KG/M2 | OXYGEN SATURATION: 98 % | DIASTOLIC BLOOD PRESSURE: 76 MMHG | SYSTOLIC BLOOD PRESSURE: 155 MMHG | TEMPERATURE: 97.2 F | WEIGHT: 176.6 LBS | RESPIRATION RATE: 20 BRPM | HEIGHT: 64 IN | HEART RATE: 90 BPM

## 2021-02-08 DIAGNOSIS — E21.0 PRIMARY HYPERPARATHYROIDISM (HCC): Primary | ICD-10-CM

## 2021-02-08 PROCEDURE — 1090F PRES/ABSN URINE INCON ASSESS: CPT | Performed by: SURGERY

## 2021-02-08 PROCEDURE — G8536 NO DOC ELDER MAL SCRN: HCPCS | Performed by: SURGERY

## 2021-02-08 PROCEDURE — 99213 OFFICE O/P EST LOW 20 MIN: CPT | Performed by: SURGERY

## 2021-02-08 PROCEDURE — G8417 CALC BMI ABV UP PARAM F/U: HCPCS | Performed by: SURGERY

## 2021-02-08 PROCEDURE — G8432 DEP SCR NOT DOC, RNG: HCPCS | Performed by: SURGERY

## 2021-02-08 PROCEDURE — G8399 PT W/DXA RESULTS DOCUMENT: HCPCS | Performed by: SURGERY

## 2021-02-08 PROCEDURE — G8427 DOCREV CUR MEDS BY ELIG CLIN: HCPCS | Performed by: SURGERY

## 2021-02-08 PROCEDURE — 3017F COLORECTAL CA SCREEN DOC REV: CPT | Performed by: SURGERY

## 2021-02-08 PROCEDURE — 1101F PT FALLS ASSESS-DOCD LE1/YR: CPT | Performed by: SURGERY

## 2021-02-08 NOTE — PROGRESS NOTES
Chief Complaint   Patient presents with    Thyroid Problem     follow up hyperparathyroidism w/ hypercalcemia        1. Have you been to the ER, urgent care clinic since your last visit? Hospitalized since your last visit? ER, [facility] fracture left hand. 2. Have you seen or consulted any other health care providers outside of the [address] [place] [address] since your last visit? Include any pap smears or colon screening. No    Pt fell this morning on same side (left) as hand fracture.

## 2021-02-08 NOTE — PROGRESS NOTES
HISTORY OF PRESENT ILLNESS  Burt Marrufo is a 79 y.o. female who comes in for consultation by Caity Ha DO and Dr Bethann Phalen for hyperparathyroidism  Thyroid Problem  Pertinent negatives include no chest pain, no abdominal pain, no headaches and no shortness of breath. She has been noted to have an elevated calcium for over 8 years. Recently it has been over 11. PTH was normal.   She had an US and NM parathyroid scan suggesting a 1.3 cm right inferior parathyroid consistent with adenoma. She denies hx kidney stones, fractures, abdominal pain, depression although she does have some fatigue and memory loss. Past Medical History:   Diagnosis Date    Arthritis     Diabetes ([address]     Fracture     HTN (hypertension)     Hyperlipemia      Past Surgical History:   Procedure Laterality Date    HX CHOLECYSTECTOMY      HX FRACTURE TX Left     HX ORTHOPAEDIC      lumbar discectomy      Family History   Problem Relation Age of Onset    Diabetes Father     Kidney Disease Father     Diabetes Brother     Cancer Brother     Stroke Mother     Kidney Disease Brother     Cancer Brother     Other Brother         kidney stones    Cancer Sister     Cancer Sister      Social History     Tobacco Use    Smoking status: Never Smoker    Smokeless tobacco: Never Used   Substance Use Topics    Alcohol use: No    Drug use: No     Current Outpatient Medications   Medication Sig    calcium carbonate (TUMS) 200 mg calcium (500 mg) chew Take 1 Tab by mouth Before breakfast and dinner.  ReliOn Prime Meter misc USE TO CHECK BLOOD SUGAR ONCE DAILY    ReliOn Prime Test Strips strip USE TO CHECK BLOOD SUGAR ONCE DAILY    Alcohol Prep Pads padm     diclofenac (VOLTAREN) 1 % gel     meloxicam (MOBIC) 15 mg tablet     cholecalciferol (Vitamin D3) (1000 Units /25 mcg) tablet Take 1,000 Units by mouth daily.  losartan (COZAAR) 50 mg tablet 50 mg daily.     ferrous sulfate 325 mg (65 mg iron) tablet Take 325 mg by mouth.  glipiZIDE SR (GLUCOTROL XL) 5 mg CR tablet daily.  metFORMIN (GLUCOPHAGE) 1,000 mg tablet two (2) times a day.  latanoprost (XALATAN) 0.005 % ophthalmic solution Administer 1 Drop to both eyes nightly.  aspirin 81 mg chewable tablet Take 81 mg by mouth every other day.  enalapril (VASOTEC) 20 mg tablet Take 20 mg by mouth daily.  amLODIPine (NORVASC) 10 mg tablet Take  by mouth daily.  cloNIDine HCl (CATAPRES) 0.1 mg tablet Take  by mouth two (2) times a day.  pravastatin (PRAVACHOL) 40 mg tablet Take 40 mg by mouth daily.  metoprolol (LOPRESSOR) 100 mg tablet Take  by mouth two (2) times a day.  OTHER daily. Calcium citrate + D ( Vit D3 500 units and Calcium 630    cholecalciferol, VITAMIN D3, (VITAMIN D3) 5,000 unit tab tablet Take  by mouth daily. No current facility-administered medications for this visit. Allergies   Allergen Reactions    Enalapril Swelling       Review of Systems   Constitutional: Negative for chills, diaphoresis, fever and weight loss. HENT: Negative for sore throat. Eyes: Negative for blurred vision and discharge. Respiratory: Negative for cough, shortness of breath and wheezing. Cardiovascular: Negative for chest pain, palpitations, orthopnea, claudication and leg swelling. Gastrointestinal: Negative for abdominal pain, constipation, diarrhea, heartburn, melena, nausea and vomiting. Genitourinary: Negative for dysuria, flank pain, frequency and hematuria. Musculoskeletal: Negative for back pain, joint pain, myalgias and neck pain. Skin: Negative for rash. Neurological: Negative for dizziness, speech change, focal weakness, seizures, loss of consciousness, weakness and headaches. Endo/Heme/Allergies: Bruises/bleeds easily. Psychiatric/Behavioral: Negative for depression and memory loss.      Visit Vitals  BP (!) 155/76 (BP 1 Location: Left upper arm, BP Patient Position: Sitting)   Pulse 90   Temp 97.2 °F (36.2 °C) (Temporal)   Resp 20   Ht 5' 4\" (1.626 m)   Wt 80.1 kg (176 lb 9.6 oz)   SpO2 98%   BMI 30.31 kg/m²       Physical Exam  Constitutional:       General: She is not in acute distress. Appearance: She is well-developed. She is not diaphoretic. HENT:      Head: Normocephalic and atraumatic. Mouth/Throat:      Pharynx: No oropharyngeal exudate. Eyes:      General: No scleral icterus. Conjunctiva/sclera: Conjunctivae normal.      Pupils: Pupils are equal, round, and reactive to light. Neck:      Musculoskeletal: Normal range of motion and neck supple. Thyroid: No thyromegaly. Vascular: No JVD. Trachea: No tracheal deviation. Cardiovascular:      Rate and Rhythm: Normal rate and regular rhythm. Heart sounds: No murmur. No friction rub. No gallop. Pulmonary:      Effort: Pulmonary effort is normal. No respiratory distress. Breath sounds: Normal breath sounds. No wheezing or rales. Abdominal:      General: Bowel sounds are normal. There is no distension. Palpations: Abdomen is soft. There is no mass. Tenderness: There is no abdominal tenderness. There is no guarding or rebound. Musculoskeletal: Normal range of motion. Lymphadenopathy:      Cervical: No cervical adenopathy. Skin:     General: Skin is warm and dry. Coloration: Skin is not pale. Findings: No erythema or rash. Neurological:      Mental Status: She is alert and oriented to person, place, and time. Cranial Nerves: No cranial nerve deficit. Psychiatric:         Behavior: Behavior normal.         Thought Content: Thought content normal.         Judgment: Judgment normal.         ASSESSMENT and PLAN  1. Primary hyperparathyroidism. I explained to her and her  about the anatomy and pathophysiology of the process. I explained about risks of hypercalcemia and bone loss.   I discussed parathyroidectomy and risks including, but not limited to, bleeding, infection, hypoparathyroidism, recurrent laryngeal nerve injury, need for partial/thyroidectomy, DVT/PE, cardiac/CNS/pulmonary/Urinary complications and [id number].    2.  Essential hypertension. Stable on rx  3. NIDDM type 2. Controlled on rx  4. Anemia. On supplemental iron  5.  covid-19. Received first vaccine 2/6/2021. She and her  are reluctant to undergo surgery due to Covid-19 risks at this time    She is interested in parathyroidectomy under general anesthesia as an outpatient with an overnight stay    She and her  are extremely anxious about Covid-19 and the recent increase in cases    The patient was counseled at length about the risks of cesar Covid-19 during their perioperative period and any recovery window from their procedure. The patient was made aware that cesar Covid-19  may worsen their prognosis for recovering from their procedure and lend to a higher morbidity and/or mortality risk. All material risks, benefits, and reasonable alternatives including postponing the procedure were discussed. The patient does not  wish to proceed with the procedure at this time.       They will return in 4 months and consider surgery at that time    Cody George MD FACS        RTC 2 months    Cody George MD FACS

## 2021-02-08 NOTE — LETTER
2/8/2021 Patient: Daniel Thomas YOB: 1950 Date of Visit: 2/8/2021 Usama Ivan DO 
[address] [place] [address] Via Fax: [phone] Dar Son MD 
[address] [place] [address] [address] [place] [address] Via In [employer] Dear DO Dar Shepard MD, Thank you for referring Ms. Daniel Thomas to  [facility] for evaluation. My notes for this consultation are attached. If you have questions, please do not hesitate to call me. I look forward to following your patient along with you.  
 
 
Sincerely, 
 
Steve Hager MD

## 2021-02-11 LAB
25(OH)D3+25(OH)D2 SERPL-MCNC: 47.3 NG/ML (ref 30–100)
BUN SERPL-MCNC: 11 MG/DL (ref 8–27)
BUN/CREAT SERPL: 15 (ref 12–28)
CALCIUM SERPL-MCNC: 10.9 MG/DL (ref 8.7–10.3)
CHLORIDE SERPL-SCNC: 106 MMOL/L (ref 96–106)
CO2 SERPL-SCNC: 24 MMOL/L (ref 20–29)
CREAT SERPL-MCNC: 0.75 MG/DL (ref 0.57–1)
GLUCOSE SERPL-MCNC: 139 MG/DL (ref 65–99)
POTASSIUM SERPL-SCNC: 3.7 MMOL/L (ref 3.5–5.2)
PTH-INTACT SERPL-MCNC: 20 PG/ML (ref 15–65)
SODIUM SERPL-SCNC: 145 MMOL/L (ref 134–144)

## 2021-02-15 ENCOUNTER — VIRTUAL VISIT (OUTPATIENT)
Dept: ENDOCRINOLOGY | Age: 71
End: 2021-02-15
Payer: MEDICARE

## 2021-02-15 DIAGNOSIS — E21.0 HYPERPARATHYROIDISM, PRIMARY (HCC): Primary | ICD-10-CM

## 2021-02-15 DIAGNOSIS — E83.52 HYPERCALCEMIA: ICD-10-CM

## 2021-02-15 DIAGNOSIS — E55.9 VITAMIN D DEFICIENCY: ICD-10-CM

## 2021-02-15 PROCEDURE — 99443 PR PHYS/QHP TELEPHONE EVALUATION 21-30 MIN: CPT | Performed by: INTERNAL MEDICINE

## 2021-02-15 RX ORDER — CYCLOBENZAPRINE HCL 5 MG
TABLET ORAL
COMMUNITY
Start: 2020-12-26 | End: 2022-05-04

## 2021-02-15 NOTE — PROGRESS NOTES
**DUE TO PANDEMIC AND HEALTH CONCERNS IN THE COMMUNITY, THIS PATIENT WAS EITHER ILL OR FOUND TO BE HIGH RISK FOR IN-PERSON EVALUATION WITHIN THE CLINIC. THE FOLLOWING IS A VIRTUAL VISIT VIA A TELEPHONE ENCOUNTER TO WHICH THE PATIENT AGREED. THE PURPOSE IS TO LIMIT INTERRUPTIONS IN HEALTHCARE AND TO PROVIDE FOR ONGOING URGENT NEEDS UNDER THE CURRENT CONDITIONS. THE PATIENT CONFIRMS THEY ARE AWARE OF THE LIMITATIONS OF THE TELEPHONE VISIT. CHIEF COMPLAINT: f/u suspected primary hyperparathyroidism, asymptomatic     HISTORY OF PRESENT ILLNESS:   Griselda Lands is a 79 y.o. female with a PMHx as noted below who presents for f/u evaluation of hypercalcemia. Patient is noted for evaluation of hypercalcemia with Dr. Shivani Peralta back in 2012. No history of kidney stones. Family history is not significant for hypercalcemia.    3/14/17: DXA with T= - 1.1 at the [facility], forarm not completed  10/31/19: NM Parathyroid Scan: Parathyroid adenoma base of the right thyroid gland  10/31/19: Thyroid US: 1.3 cm nodule, base of Rt lobe, compatible with parathyroid adenoma  Monitored conservatively initially per her preference, was asymptomatic, with vitamin D replacement  She continues on D3 1000 U daily and 500mg of calcium carbonate BID  Serum calcium did rise to 11.2 and has remained generally elevated,  She was referred to surgery for evaluation, seen recently by Dr. Maegan Rizo,  They discussed elective surgery, she desires to wait until things cool down with COVID,  No falls or fractures since last visit,  No kidney stones,   See labs below,    Review of most recent bone-related metabolic lab panel:  Lab Results   Component Value Date    PTHILT 20 02/10/2021    PTHILT 28 10/01/2020    PTHILT 29 05/22/2020    [id number] <1.1 08/28/2017    CA 10.9 (H) 02/10/2021    CA 11.2 (H) 10/29/2020    CA 11.1 (H) 10/01/2020    CALU2 45 (L) 12/14/2020    VITD3 47.3 02/10/2021    VITD3 52.7 05/22/2020    VITD3 69.2 01/15/2019    GFRNA 81 02/10/2021 GFRNA 67 10/29/2020    GFRNA 84 10/01/2020    GFRAA 93 02/10/2021    GFRAA 77 10/29/2020    GFRAA 96 10/01/2020    TSH 2.470 08/28/2017     LAB KEY:  [id number]: PTHrP level  [id number]: Serum NTX level  PTHILT: Intact PTH level  PTHRLT:  PTH-rP  CILT: Ionized Calcium  CALU2: 24 hr urine calcium      PAST MEDICAL/SURGICAL HISTORY:   Past Medical History:   Diagnosis Date    Arthritis     Diabetes ([address]     Fracture     HTN (hypertension)     Hyperlipemia      Past Surgical History:   Procedure Laterality Date    HX CHOLECYSTECTOMY      HX FRACTURE TX Left     HX ORTHOPAEDIC      lumbar discectomy        ALLERGIES:   Allergies   Allergen Reactions    Enalapril Swelling       MEDICATIONS ON ADMISSION:     Current Outpatient Medications:     calcium carbonate (TUMS) 200 mg calcium (500 mg) chew, Take 1 Tab by mouth Before breakfast and dinner., Disp: 200 Tab, Rfl: 5    cholecalciferol (Vitamin D3) (1000 Units /25 mcg) tablet, Take 1,000 Units by mouth daily. , Disp: , Rfl:     losartan (COZAAR) 50 mg tablet, 50 mg daily. , Disp: , Rfl:     ferrous sulfate 325 mg (65 mg iron) tablet, Take 325 mg by mouth., Disp: , Rfl:     glipiZIDE SR (GLUCOTROL XL) 5 mg CR tablet, daily. , Disp: , Rfl:     metFORMIN (GLUCOPHAGE) 1,000 mg tablet, two (2) times a day., Disp: , Rfl:     latanoprost (XALATAN) 0.005 % ophthalmic solution, Administer 1 Drop to both eyes nightly., Disp: , Rfl:     aspirin 81 mg chewable tablet, Take 81 mg by mouth every other day., Disp: , Rfl:     amLODIPine (NORVASC) 10 mg tablet, Take  by mouth daily. , Disp: , Rfl:     cloNIDine HCl (CATAPRES) 0.1 mg tablet, Take  by mouth two (2) times a day., Disp: , Rfl:     pravastatin (PRAVACHOL) 40 mg tablet, Take 40 mg by mouth daily. , Disp: , Rfl:     metoprolol (LOPRESSOR) 100 mg tablet, Take  by mouth two (2) times a day., Disp: , Rfl:     cyclobenzaprine (FLEXERIL) 5 mg tablet, TAKE 1 TABLET BY MOUTH AT BEDTIME AS NEEDED FOR MUSCLE SPASM, Disp: , Rfl:     ReliOn Prime Meter misc, USE TO CHECK BLOOD SUGAR ONCE DAILY, Disp: , Rfl:     ReliOn Prime Test Strips strip, USE TO CHECK BLOOD SUGAR ONCE DAILY, Disp: , Rfl:     Alcohol Prep Pads padm, , Disp: , Rfl:     diclofenac (VOLTAREN) 1 % gel, , Disp: , Rfl:     meloxicam (MOBIC) 15 mg tablet, , Disp: , Rfl:     OTHER, daily. Calcium citrate + D ( Vit D3 500 units and Calcium 630, Disp: , Rfl:     enalapril (VASOTEC) 20 mg tablet, Take 20 mg by mouth daily. , Disp: , Rfl:     SOCIAL HISTORY:   Social History     Socioeconomic History    Marital status:      Spouse name: Not on file    Number of children: Not on file    Years of education: Not on file    Highest education level: Not on file   Occupational History    Not on file   Social Needs    Financial resource strain: Not on file    Food insecurity     Worry: Not on file     Inability: Not on file    Transportation needs     Medical: Not on file     Non-medical: Not on file   Tobacco Use    Smoking status: Never Smoker    Smokeless tobacco: Never Used   Substance and Sexual Activity    Alcohol use: No    Drug use: No    Sexual activity: Not on file   Lifestyle    Physical activity     Days per week: Not on file     Minutes per session: Not on file    Stress: Not on file   Relationships    Social connections     Talks on phone: Not on file     Gets together: Not on file     Attends [employer]'s witness service: Not on file     Active member of club or organization: Not on file     Attends meetings of clubs or organizations: Not on file     Relationship status: Not on file    Intimate partner violence     Fear of current or ex partner: Not on file     Emotionally abused: Not on file     Physically abused: Not on file     Forced sexual activity: Not on file   Other Topics Concern    Not on file   Social History Narrative    Not on file       FAMILY HISTORY:  Family History   Problem Relation Age of Onset    Diabetes Father    Surjit Kidney Disease Father     Diabetes Brother     Cancer Brother     Stroke Mother     Kidney Disease Brother     Cancer Brother     Other Brother         kidney stones    Cancer Sister     Cancer Sister        REVIEW OF SYSTEMS: Complete ROS assessed and noted for that which is described above, all else are negative. Eyes: normal  ENT: normal  CVS: normal  Resp: normal  GI: normal  : normal  GYN: normal  Endocrine: normal  Integument: normal  Musculoskeletal: normal  Neuro: normal  Psych: normal    PHYSICAL EXAMINATION:  Telephone Visit    REVIEW OF LABORATORY AND RADIOLOGY DATA:   Labs and documentation have been reviewed as described above. ASSESSMENT AND PLAN:   Win Moncada is a 79 y.o. female with a PMHx as noted above who presents for f/u evaluation of hypercalcemia. Hypercalcemia  Suspected Primary hyperparathyroidism, mild asymptomatic    Patient is on calcium and Vit D replacement, with calcium that occasionally rises >11, and in the setting of hyperparathyroidism we noted the potential consequences of this. She is anxious about the notion of surgery, I understand this. She would like to know what calcium level is reasonable to monitor without surgery and to a degree this is sort of an arbitrary number as the definition of symptoms can vary, and the window of opportunity for elective surgery can add to the complexity considering all things in the past year. Overall I would like to see her calcium come back down to around 10.6 or so for continued conservative monitoring, but it is good that she did establish care with Dr. Blanquita Flores due to the likelihood that surgery will likely be needed.      Plan:   Vit D: 1000 units of Vitamin D3 daily,  Calcium: 500 mg calcium carbonate twice daily  Labs: BMP before next visit  Imaging: + for Rt lower parathyroid adenoma  To f/u with surgery as planned, particularly as to advise when she feels comfortable to proceed with surgery    RTC Iqra 10 at 11:50 AM,    25 minutes spent toward telephone visit today of which >50% of this time was spent in counseling and coordination of care. Kem Garrison.  [address] [facility]

## 2021-05-15 LAB
BUN SERPL-MCNC: 16 MG/DL (ref 8–27)
BUN/CREAT SERPL: 19 (ref 12–28)
CALCIUM SERPL-MCNC: 11 MG/DL (ref 8.7–10.3)
CHLORIDE SERPL-SCNC: 105 MMOL/L (ref 96–106)
CO2 SERPL-SCNC: 26 MMOL/L (ref 20–29)
CREAT SERPL-MCNC: 0.86 MG/DL (ref 0.57–1)
GLUCOSE SERPL-MCNC: 139 MG/DL (ref 65–99)
POTASSIUM SERPL-SCNC: 4 MMOL/L (ref 3.5–5.2)
SODIUM SERPL-SCNC: 144 MMOL/L (ref 134–144)

## 2021-05-19 ENCOUNTER — VIRTUAL VISIT (OUTPATIENT)
Dept: ENDOCRINOLOGY | Age: 71
End: 2021-05-19
Payer: MEDICARE

## 2021-05-19 DIAGNOSIS — E55.9 VITAMIN D DEFICIENCY: ICD-10-CM

## 2021-05-19 DIAGNOSIS — E21.0 HYPERPARATHYROIDISM, PRIMARY (HCC): Primary | ICD-10-CM

## 2021-05-19 DIAGNOSIS — E83.52 HYPERCALCEMIA: ICD-10-CM

## 2021-05-19 PROCEDURE — 99442 PR PHYS/QHP TELEPHONE EVALUATION 11-20 MIN: CPT | Performed by: INTERNAL MEDICINE

## 2021-05-19 RX ORDER — CALCIUM CARBONATE 500(1250)
TABLET ORAL DAILY
COMMUNITY
End: 2022-05-04

## 2021-05-19 RX ORDER — LANOLIN ALCOHOL/MO/W.PET/CERES
1000 CREAM (GRAM) TOPICAL DAILY
COMMUNITY
End: 2022-05-04

## 2021-05-19 NOTE — PROGRESS NOTES
**DUE TO PANDEMIC AND HEALTH CONCERNS IN THE COMMUNITY, THIS PATIENT WAS EITHER ILL OR FOUND TO BE HIGH RISK FOR IN-PERSON EVALUATION WITHIN THE CLINIC. THE FOLLOWING IS A VIRTUAL VISIT VIA A TELEPHONE ENCOUNTER TO WHICH THE PATIENT AGREED. THE PURPOSE IS TO LIMIT INTERRUPTIONS IN HEALTHCARE AND TO PROVIDE FOR ONGOING URGENT NEEDS UNDER THE CURRENT CONDITIONS. THE PATIENT CONFIRMS THEY ARE AWARE OF THE LIMITATIONS OF THE TELEPHONE VISIT. CHIEF COMPLAINT: f/u suspected primary hyperparathyroidism, asymptomatic     HISTORY OF PRESENT ILLNESS:   Buddyda Parent is a 70 y.o. female with a PMHx as noted below who presents for f/u evaluation of hypercalcemia. Patient is noted for evaluation of hypercalcemia with Dr. Jojo rome in 2012. No history of kidney stones. Family history is not significant for hypercalcemia.    3/14/17: DXA with T= - 1.1 at the [facility], forarm not completed  10/31/19: NM Parathyroid Scan: Parathyroid adenoma base of the right thyroid gland  10/31/19: Thyroid US: 1.3 cm nodule, base of Rt lobe, compatible with parathyroid adenoma  Monitored conservatively initially per her preference, was asymptomatic, with vitamin D replacement  She continues on D3 1000 U daily and 500mg of calcium carbonate BID  Serum calcium did rise to 11.2 and has remained generally elevated,  Most recently her calcium is 11.0, PTH of 20, Vit D 47,   Her urine calcium was low but was not a full collection, only 500 ml,  We did start 500mg of calcium carbonate with breakfast and dinner,  She was referred to surgery for evaluation, seen by Dr. Robbie Sexton,  She had a follow up visit with him in February of this year, has f/u in June,  They discussed elective surgery, she desires to wait until things cool down with COVID,  Since last visit she is feeling well,   No interval falls, fractures or kidney stones,   No interval hospital visits,   Review of most recent bone-related metabolic lab panel:  Lab Results   Component Value Date    PTHILT 20 02/10/2021    PTHILT 28 10/01/2020    PTHILT 29 05/22/2020    [id number] <1.1 08/28/2017    CA 11.0 (H) 05/14/2021    CA 10.9 (H) 02/10/2021    CA 11.2 (H) 10/29/2020    CALU2 45 (L) 12/14/2020    VITD3 47.3 02/10/2021    VITD3 52.7 05/22/2020    VITD3 69.2 01/15/2019    GFRNA 68 05/14/2021    GFRNA 81 02/10/2021    GFRNA 67 10/29/2020    GFRAA 79 05/14/2021    GFRAA 93 02/10/2021    GFRAA 77 10/29/2020    TSH 2.470 08/28/2017     LAB KEY:  [id number]: PTHrP level  [id number]: Serum NTX level  PTHILT: Intact PTH level  PTHRLT:  PTH-rP  CILT: Ionized Calcium  CALU2: 24 hr urine calcium      PAST MEDICAL/SURGICAL HISTORY:   Past Medical History:   Diagnosis Date    Arthritis     Diabetes ([address]     Fracture     HTN (hypertension)     Hyperlipemia      Past Surgical History:   Procedure Laterality Date    HX CHOLECYSTECTOMY      HX FRACTURE TX Left     HX ORTHOPAEDIC      lumbar discectomy        ALLERGIES:   Allergies   Allergen Reactions    Enalapril Swelling       MEDICATIONS ON ADMISSION:     Current Outpatient Medications:     calcium carbonate (OS-ANTONETTE) 500 mg calcium (1,250 mg) tablet, Take  by mouth daily. , Disp: , Rfl:     cyanocobalamin (Vitamin B-12) 1,000 mcg tablet, Take 1,000 mcg by mouth daily. , Disp: , Rfl:     diclofenac (VOLTAREN) 1 % gel, , Disp: , Rfl:     cholecalciferol (Vitamin D3) (1000 Units /25 mcg) tablet, Take 1,000 Units by mouth daily. , Disp: , Rfl:     losartan (COZAAR) 50 mg tablet, 50 mg daily. , Disp: , Rfl:     ferrous sulfate 325 mg (65 mg iron) tablet, Take 325 mg by mouth., Disp: , Rfl:     glipiZIDE SR (GLUCOTROL XL) 5 mg CR tablet, daily. , Disp: , Rfl:     metFORMIN (GLUCOPHAGE) 1,000 mg tablet, two (2) times a day., Disp: , Rfl:     latanoprost (XALATAN) 0.005 % ophthalmic solution, Administer 1 Drop to both eyes nightly., Disp: , Rfl:     aspirin 81 mg chewable tablet, Take 81 mg by mouth every other day., Disp: , Rfl:     amLODIPine (NORVASC) 10 mg tablet, Take  by mouth daily. , Disp: , Rfl:     cloNIDine HCl (CATAPRES) 0.1 mg tablet, Take  by mouth two (2) times a day., Disp: , Rfl:     pravastatin (PRAVACHOL) 40 mg tablet, Take 40 mg by mouth daily. , Disp: , Rfl:     metoprolol (LOPRESSOR) 100 mg tablet, Take  by mouth two (2) times a day., Disp: , Rfl:     cyclobenzaprine (FLEXERIL) 5 mg tablet, TAKE 1 TABLET BY MOUTH AT BEDTIME AS NEEDED FOR MUSCLE SPASM (Patient not taking: Reported on 5/19/2021), Disp: , Rfl:     calcium carbonate (TUMS) 200 mg calcium (500 mg) chew, Take 1 Tab by mouth Before breakfast and dinner. (Patient not taking: Reported on 5/19/2021), Disp: 200 Tab, Rfl: 5    ReliOn Prime Meter misc, USE TO CHECK BLOOD SUGAR ONCE DAILY, Disp: , Rfl:     ReliOn Prime Test Strips strip, USE TO CHECK BLOOD SUGAR ONCE DAILY, Disp: , Rfl:     Alcohol Prep Pads padm, , Disp: , Rfl:     meloxicam (MOBIC) 15 mg tablet, , Disp: , Rfl:     OTHER, daily. Calcium citrate + D ( Vit D3 500 units and Calcium 630 (Patient not taking: Reported on 5/19/2021), Disp: , Rfl:     enalapril (VASOTEC) 20 mg tablet, Take 20 mg by mouth daily.  (Patient not taking: Reported on 5/19/2021), Disp: , Rfl:     SOCIAL HISTORY:   Social History     Socioeconomic History    Marital status:      Spouse name: Not on file    Number of children: Not on file    Years of education: Not on file    Highest education level: Not on file   Occupational History    Not on file   Tobacco Use    Smoking status: Never Smoker    Smokeless tobacco: Never Used   Vaping Use    Vaping Use: Never used   Substance and Sexual Activity    Alcohol use: No    Drug use: No    Sexual activity: Not on file   Other Topics Concern    Not on file   Social History Narrative    Not on file     Social Determinants of Health     Financial Resource Strain:     Difficulty of Paying Living Expenses:    Food Insecurity:     Worried About Running Out of Food in the Last Year:     Ran Out of Food in the Last Year:    Transportation Needs:     Lack of Transportation (Medical):  Lack of Transportation (Non-Medical):    Physical Activity:     Days of Exercise per Week:     Minutes of Exercise per Session:    Stress:     Feeling of Stress :    Social Connections:     Frequency of Communication with Friends and Family:     Frequency of Social Gatherings with Friends and Family:     Attends [employer] Services:     Active Member of Clubs or Organizations:     Attends Club or Organization Meetings:     Marital Status:    Intimate Partner Violence:     Fear of Current or Ex-Partner:     Emotionally Abused:     Physically Abused:     Sexually Abused:        FAMILY HISTORY:  Family History   Problem Relation Age of Onset    Diabetes Father     Kidney Disease Father     Diabetes Brother     Cancer Brother     Stroke Mother     Kidney Disease Brother     Cancer Brother     Other Brother         kidney stones    Cancer Sister     Cancer Sister        REVIEW OF SYSTEMS: Complete ROS assessed and noted for that which is described above, all else are negative. Eyes: normal  ENT: normal  CVS: normal  Resp: normal  GI: normal  : normal  GYN: normal  Endocrine: normal  Integument: normal  Musculoskeletal: normal  Neuro: normal  Psych: normal    PHYSICAL EXAMINATION:  Telephone Visit    REVIEW OF LABORATORY AND RADIOLOGY DATA:   Labs and documentation have been reviewed as described above. ASSESSMENT AND PLAN:   Leonel Gaona is a 70 y.o. female with a PMHx as noted above who presents for f/u evaluation of hypercalcemia. Hypercalcemia  Suspected Primary hyperparathyroidism, mild asymptomatic  Vit D deficiency    Patient is on calcium and Vit D replacement, with calcium that occasionally rises >11, and in the setting of hyperparathyroidism we noted the potential consequences of this.  So far her calcium has not come back down to around 10.5 or 10.6 where monitoring is more reasonable. I believe she would benefit from potential surgery and she is planning to keep her surgery f/u appt in June. Plan:   Vit D: 1000 units of Vitamin D3 daily,  Calcium: 500 mg calcium carbonate twice daily  Labs: reviewed, will need post op PTH/BMP/Vit D levels for evaluation  Imaging: positive for Rt lower parathyroid adenoma  Surgery: Has plans for f/u    20 minutes spent toward telephone visit today of which >50% of this time was spent in counseling and coordination of care. Artemio Costa.  [id number] [facility]

## 2021-06-07 ENCOUNTER — OFFICE VISIT (OUTPATIENT)
Dept: SURGERY | Age: 71
End: 2021-06-07
Payer: MEDICARE

## 2021-06-07 VITALS
HEART RATE: 69 BPM | OXYGEN SATURATION: 98 % | WEIGHT: 182.9 LBS | DIASTOLIC BLOOD PRESSURE: 80 MMHG | SYSTOLIC BLOOD PRESSURE: 143 MMHG | TEMPERATURE: 96.7 F | BODY MASS INDEX: 31.22 KG/M2 | RESPIRATION RATE: 18 BRPM | HEIGHT: 64 IN

## 2021-06-07 DIAGNOSIS — E21.0 PRIMARY HYPERPARATHYROIDISM (HCC): Primary | ICD-10-CM

## 2021-06-07 PROCEDURE — G8536 NO DOC ELDER MAL SCRN: HCPCS | Performed by: SURGERY

## 2021-06-07 PROCEDURE — G8417 CALC BMI ABV UP PARAM F/U: HCPCS | Performed by: SURGERY

## 2021-06-07 PROCEDURE — G8427 DOCREV CUR MEDS BY ELIG CLIN: HCPCS | Performed by: SURGERY

## 2021-06-07 PROCEDURE — 1090F PRES/ABSN URINE INCON ASSESS: CPT | Performed by: SURGERY

## 2021-06-07 PROCEDURE — 3017F COLORECTAL CA SCREEN DOC REV: CPT | Performed by: SURGERY

## 2021-06-07 PROCEDURE — G8399 PT W/DXA RESULTS DOCUMENT: HCPCS | Performed by: SURGERY

## 2021-06-07 PROCEDURE — G8432 DEP SCR NOT DOC, RNG: HCPCS | Performed by: SURGERY

## 2021-06-07 PROCEDURE — 99214 OFFICE O/P EST MOD 30 MIN: CPT | Performed by: SURGERY

## 2021-06-07 PROCEDURE — 1101F PT FALLS ASSESS-DOCD LE1/YR: CPT | Performed by: SURGERY

## 2021-06-07 NOTE — PROGRESS NOTES
HISTORY OF PRESENT ILLNESS  Cristo Bhagat is a 70 y.o. female who comes in for reevaluation by AMBER Ferrera and Dr Welch Friend for hyperparathyroidism  Thyroid Problem  Pertinent negatives include no chest pain, no abdominal pain, no headaches and no shortness of breath. Follow-up  Pertinent negatives include no chest pain, no abdominal pain, no headaches and no shortness of breath. She has been noted to have an elevated calcium for over 8 years. Recently it has been over 11. PTH was normal.   She had an US and NM parathyroid scan suggesting a 1.3 cm right inferior parathyroid consistent with adenoma. She denies hx kidney stones, fractures, abdominal pain, depression although she does have some fatigue and memory loss. She had repeat labs 5/14/2021 and calcium was still 11. She saw Dr Lieutenant Rubio who felt surgery was best option at this point. Past Medical History:   Diagnosis Date    Arthritis     Diabetes ([address]     Fracture     HTN (hypertension)     Hyperlipemia      Past Surgical History:   Procedure Laterality Date    HX CHOLECYSTECTOMY      HX FRACTURE TX Left     HX ORTHOPAEDIC      lumbar discectomy      Family History   Problem Relation Age of Onset    Diabetes Father     Kidney Disease Father     Diabetes Brother     Cancer Brother     Stroke Mother     Kidney Disease Brother     Cancer Brother     Other Brother         kidney stones    Cancer Sister     Cancer Sister      Social History     Tobacco Use    Smoking status: Never Smoker    Smokeless tobacco: Never Used   Vaping Use    Vaping Use: Never used   Substance Use Topics    Alcohol use: No    Drug use: No     Current Outpatient Medications   Medication Sig    calcium carbonate (OS-ANTONETTE) 500 mg calcium (1,250 mg) tablet Take  by mouth daily.  cyanocobalamin (Vitamin B-12) 1,000 mcg tablet Take 1,000 mcg by mouth daily.     cyclobenzaprine (FLEXERIL) 5 mg tablet TAKE 1 TABLET BY MOUTH AT BEDTIME AS NEEDED FOR MUSCLE SPASM    calcium carbonate (TUMS) 200 mg calcium (500 mg) chew Take 1 Tab by mouth Before breakfast and dinner.  ReliOn Prime Meter misc USE TO CHECK BLOOD SUGAR ONCE DAILY    ReliOn Prime Test Strips strip USE TO CHECK BLOOD SUGAR ONCE DAILY    Alcohol Prep Pads padm     diclofenac (VOLTAREN) 1 % gel     cholecalciferol (Vitamin D3) (1000 Units /25 mcg) tablet Take 1,000 Units by mouth daily.  losartan (COZAAR) 50 mg tablet 50 mg daily.  ferrous sulfate 325 mg (65 mg iron) tablet Take 325 mg by mouth.  glipiZIDE SR (GLUCOTROL XL) 5 mg CR tablet daily.  metFORMIN (GLUCOPHAGE) 1,000 mg tablet two (2) times a day.  latanoprost (XALATAN) 0.005 % ophthalmic solution Administer 1 Drop to both eyes nightly.  OTHER daily. Calcium citrate + D ( Vit D3 500 units and Calcium 630     aspirin 81 mg chewable tablet Take 81 mg by mouth every other day.  enalapril (VASOTEC) 20 mg tablet Take 20 mg by mouth daily.  amLODIPine (NORVASC) 10 mg tablet Take  by mouth daily.  cloNIDine HCl (CATAPRES) 0.1 mg tablet Take  by mouth two (2) times a day.  pravastatin (PRAVACHOL) 40 mg tablet Take 40 mg by mouth daily.  metoprolol (LOPRESSOR) 100 mg tablet Take  by mouth two (2) times a day.  meloxicam (MOBIC) 15 mg tablet  (Patient not taking: Reported on 5/19/2021)     No current facility-administered medications for this visit. Allergies   Allergen Reactions    Enalapril Swelling       Review of Systems   Constitutional: Negative for chills, diaphoresis, fever and weight loss. HENT: Negative for sore throat. Eyes: Negative for blurred vision and discharge. Respiratory: Negative for cough, shortness of breath and wheezing. Cardiovascular: Negative for chest pain, palpitations, orthopnea, claudication and leg swelling. Gastrointestinal: Negative for abdominal pain, constipation, diarrhea, heartburn, melena, nausea and vomiting.    Genitourinary: Negative for dysuria, flank pain, frequency and hematuria. Musculoskeletal: Negative for back pain, joint pain, myalgias and neck pain. Skin: Negative for rash. Neurological: Negative for dizziness, speech change, focal weakness, seizures, loss of consciousness, weakness and headaches. Endo/Heme/Allergies: Bruises/bleeds easily. Psychiatric/Behavioral: Negative for depression and memory loss. Visit Vitals  BP (!) 143/80 (BP 1 Location: Left upper arm, BP Patient Position: Sitting, BP Cuff Size: Large adult)   Pulse 69   Temp (!) 96.7 °F (35.9 °C) (Oral)   Resp 18   Ht 5' 4\" (1.626 m)   Wt 83 kg (182 lb 14.4 oz)   SpO2 98%   BMI 31.39 kg/m²       Physical Exam  Constitutional:       General: She is not in acute distress. Appearance: She is well-developed. She is not diaphoretic. HENT:      Head: Normocephalic and atraumatic. Mouth/Throat:      Pharynx: No oropharyngeal exudate. Eyes:      General: No scleral icterus. Conjunctiva/sclera: Conjunctivae normal.      Pupils: Pupils are equal, round, and reactive to light. Neck:      Thyroid: No thyromegaly. Vascular: No JVD. Trachea: No tracheal deviation. Cardiovascular:      Rate and Rhythm: Normal rate and regular rhythm. Heart sounds: No murmur heard. No friction rub. No gallop. Pulmonary:      Effort: Pulmonary effort is normal. No respiratory distress. Breath sounds: Normal breath sounds. No wheezing or rales. Abdominal:      General: Bowel sounds are normal. There is no distension. Palpations: Abdomen is soft. There is no mass. Tenderness: There is no abdominal tenderness. There is no guarding or rebound. Musculoskeletal:         General: Normal range of motion. Cervical back: Normal range of motion and neck supple. Lymphadenopathy:      Cervical: No cervical adenopathy. Skin:     General: Skin is warm and dry. Coloration: Skin is not pale. Findings: No erythema or rash.    Neurological: Mental Status: She is alert and oriented to person, place, and time. Cranial Nerves: No cranial nerve deficit. Psychiatric:         Behavior: Behavior normal.         Thought Content: Thought content normal.         Judgment: Judgment normal.         ASSESSMENT and PLAN  1. Primary hyperparathyroidism. I explained to her and her  about the anatomy and pathophysiology of the process. I explained about risks of hypercalcemia and bone loss. I discussed parathyroidectomy and risks including, but not limited to, bleeding, infection, hypoparathyroidism, recurrent laryngeal nerve injury, need for partial/thyroidectomy, DVT/PE, cardiac/CNS/pulmonary/Urinary complications and [id number].    2.  Essential hypertension. Stable on rx  3. NIDDM type 2. Controlled on rx  4. Anemia. On supplemental iron  5.  covid-19. Fully vaccinated    She desires a parathyroidectomy with NIM monitoring under general anesthesia as an outpatient with an overnight stay        The patient was counseled at length about the risks of cesar Covid-19 during their perioperative period and any recovery window from their procedure. The patient was made aware that cesar Covid-19  may worsen their prognosis for recovering from their procedure and lend to a higher morbidity and/or mortality risk. All material risks, benefits, and reasonable alternatives including postponing the procedure were discussed. The patient does not  wish to proceed with the procedure at this time.         Roxy France MD FACS

## 2021-06-07 NOTE — PROGRESS NOTES
Identified pt with two pt identifiers(name and ). Reviewed record in preparation for visit and have obtained necessary documentation. All patient medications has been reviewed. Chief Complaint   Patient presents with    Follow-up     4 month f/u [facility] hyperparathyroidism       Health Maintenance Due   Topic    Hepatitis C Screening     Shingrix Vaccine Age 49> (1 of 2)    Breast Cancer Screen Mammogram     Medicare Yearly Exam     Colorectal Cancer Screening Combo        Vitals:    21 0935   BP: (!) 143/80   Pulse: 69   Resp: 18   Temp: (!) 96.7 °F (35.9 °C)   TempSrc: Oral   SpO2: 98%   Weight: 83 kg (182 lb 14.4 oz)   Height: 5' 4\" (1.626 m)   PainSc:   0 - No pain       4. Have you been to the ER, urgent care clinic since your last visit? Hospitalized since your last visit? No    5. Have you seen or consulted any other health care providers outside of the [address] [place] [address] since your last visit? Include any pap smears or colon screening. No      Patient is accompanied by  I have received verbal consent from Ernst Rodríguez to discuss any/all medical information while they are present in the room.

## 2021-06-07 NOTE — LETTER
6/7/2021 Patient: Lynne Nice YOB: 1950 Date of Visit: 6/7/2021 AMBER Hall 
[address] [facility] [address] [place] [address] [address] Fax: [phone] Anai Alan MD 
[address] [employer] Dear AMBER Hall MD, Thank you [facility]. My notes for this consultation are attached. If you have questions, please do not hesitate to call me. I look forward to following your patient along with you.  
 
 
Sincerely, 
 
Jovanni Napoles MD

## 2021-07-01 ENCOUNTER — TELEPHONE (OUTPATIENT)
Dept: SURGERY | Age: 71
End: 2021-07-01

## 2021-09-16 ENCOUNTER — OFFICE VISIT (OUTPATIENT)
Dept: INTERNAL MEDICINE CLINIC | Age: 71
End: 2021-09-16
Payer: MEDICARE

## 2021-09-16 VITALS — SYSTOLIC BLOOD PRESSURE: 124 MMHG | DIASTOLIC BLOOD PRESSURE: 84 MMHG | TEMPERATURE: 98 F

## 2021-09-16 DIAGNOSIS — E11.49 TYPE II OR UNSPECIFIED TYPE DIABETES MELLITUS WITH NEUROLOGICAL MANIFESTATIONS, UNCONTROLLED(250.62) (HCC): ICD-10-CM

## 2021-09-16 DIAGNOSIS — I10 ESSENTIAL HYPERTENSION: Primary | ICD-10-CM

## 2021-09-16 DIAGNOSIS — E78.00 HYPERCHOLESTEREMIA: ICD-10-CM

## 2021-09-16 DIAGNOSIS — Z76.89 ESTABLISHING CARE WITH NEW DOCTOR, ENCOUNTER FOR: ICD-10-CM

## 2021-09-16 DIAGNOSIS — Z23 NEEDS FLU SHOT: ICD-10-CM

## 2021-09-16 DIAGNOSIS — D35.1 PARATHYROID ADENOMA: ICD-10-CM

## 2021-09-16 LAB
CHOLEST SERPL-MCNC: 109 MG/DL
GLUCOSE POC: 118 MG/DL
HBA1C MFR BLD HPLC: 6.4 %
HDLC SERPL-MCNC: 39 MG/DL
LDL CHOLESTEROL POC: 51 MG/DL
NON-HDL CHOLESTEROL, 011976: 70
TCHOL/HDL RATIO (POC): 2.8
TRIGL SERPL-MCNC: 92 MG/DL

## 2021-09-16 PROCEDURE — G8427 DOCREV CUR MEDS BY ELIG CLIN: HCPCS | Performed by: INTERNAL MEDICINE

## 2021-09-16 PROCEDURE — 83036 HEMOGLOBIN GLYCOSYLATED A1C: CPT | Performed by: INTERNAL MEDICINE

## 2021-09-16 PROCEDURE — G8399 PT W/DXA RESULTS DOCUMENT: HCPCS | Performed by: INTERNAL MEDICINE

## 2021-09-16 PROCEDURE — 80061 LIPID PANEL: CPT | Performed by: INTERNAL MEDICINE

## 2021-09-16 PROCEDURE — 99214 OFFICE O/P EST MOD 30 MIN: CPT | Performed by: INTERNAL MEDICINE

## 2021-09-16 PROCEDURE — 1090F PRES/ABSN URINE INCON ASSESS: CPT | Performed by: INTERNAL MEDICINE

## 2021-09-16 PROCEDURE — 82962 GLUCOSE BLOOD TEST: CPT | Performed by: INTERNAL MEDICINE

## 2021-09-16 PROCEDURE — 3046F HEMOGLOBIN A1C LEVEL >9.0%: CPT | Performed by: INTERNAL MEDICINE

## 2021-09-16 PROCEDURE — 1101F PT FALLS ASSESS-DOCD LE1/YR: CPT | Performed by: INTERNAL MEDICINE

## 2021-09-16 PROCEDURE — G8510 SCR DEP NEG, NO PLAN REQD: HCPCS | Performed by: INTERNAL MEDICINE

## 2021-09-16 PROCEDURE — G8536 NO DOC ELDER MAL SCRN: HCPCS | Performed by: INTERNAL MEDICINE

## 2021-09-16 PROCEDURE — G8417 CALC BMI ABV UP PARAM F/U: HCPCS | Performed by: INTERNAL MEDICINE

## 2021-09-16 PROCEDURE — 90694 VACC AIIV4 NO PRSRV 0.5ML IM: CPT | Performed by: INTERNAL MEDICINE

## 2021-09-16 PROCEDURE — G0008 ADMIN INFLUENZA VIRUS VAC: HCPCS | Performed by: INTERNAL MEDICINE

## 2021-09-16 PROCEDURE — 2022F DILAT RTA XM EVC RTNOPTHY: CPT | Performed by: INTERNAL MEDICINE

## 2021-09-16 PROCEDURE — 93000 ELECTROCARDIOGRAM COMPLETE: CPT | Performed by: INTERNAL MEDICINE

## 2021-09-16 PROCEDURE — 3017F COLORECTAL CA SCREEN DOC REV: CPT | Performed by: INTERNAL MEDICINE

## 2021-09-16 NOTE — PROGRESS NOTES
Corby Brandt is a 70 y.o. female and presents with [facility]  . Subjective:  She has a history of a parathyroid adenoma and states she is to require surgery hopefully soon  She is followed by endocrinology and has a surgery evaluation soon    Hypertension Review:  The patient has essential hypertension  Diet and Lifestyle: generally follows a  low sodium diet, exercises sporadically  Home BP Monitoring: is not measured at home. Pertinent ROS: taking medications as instructed, no medication side effects noted, no TIA's, no chest pain on exertion, no dyspnea on exertion, no swelling of ankles. Dyslipidemia Review:  Patient presents for evaluation of lipids. Compliance with treatment thus far has been excellent. A repeat fasting lipid profile was not done. The patient does not use medications that may worsen dyslipidemias (corticosteroids, progestins, anabolic steroids, amiodarone, cyclosporine, olanzapine). The patient exercises some      Diabetes Mellitus Review:  She has diabetes mellitus. Diabetic ROS - medication compliance: compliant all of the time, diabetic diet compliance: compliant all of the time, home glucose monitoring: is performed. Known diabetic complications: none  Cardiovascular risk factors: family history, dyslipidemia, diabetes mellitus, obesity, hypertension  Current diabetic medications include oral agents   Eye exam current (within one year): no  Weight trend: stable  Prior visit with dietician: no  Current diet: \"healthy\" diet  in general  Current exercise: walking  Current monitoring regimen: home blood tests - daily  Home blood sugar records: trend: stable  Any episodes of hypoglycemia? no  Is She on ACE inhibitor or angiotensin II receptor blocker?    Yes         Review of Systems  Constitutional: negative for fevers, chills, anorexia and weight loss  Eyes:   negative for visual disturbance and irritation  ENT:   negative for tinnitus,sore throat,nasal congestion,ear pains.hoarseness  Respiratory:  negative for cough, hemoptysis, dyspnea,wheezing  CV:   negative for chest pain, palpitations, lower extremity edema  GI:   negative for nausea, vomiting, diarrhea, abdominal pain,melena  Endo:               negative for polyuria,polydipsia,polyphagia,heat intolerance  Genitourinary: negative for frequency, dysuria and hematuria  Integument:  negative for rash and pruritus  Hematologic:  negative for easy bruising and gum/nose bleeding  Musculoskel: negative for myalgias, arthralgias, back pain, muscle weakness, joint pain  Neurological:  negative for headaches, dizziness, vertigo, memory problems and gait   Behavl/Psych: negative for feelings of anxiety, depression, mood changes    Past Medical History:   Diagnosis Date    Arthritis     Diabetes ([address]     Fracture     HTN (hypertension)     Hyperlipemia      Past Surgical History:   Procedure Laterality Date    HX CHOLECYSTECTOMY      HX FRACTURE TX Left     HX ORTHOPAEDIC      lumbar discectomy      Social History     Socioeconomic History    Marital status:      Spouse name: Not on file    Number of children: Not on file    Years of education: Not on file    Highest education level: Not on file   Tobacco Use    Smoking status: Never Smoker    Smokeless tobacco: Never Used   Vaping Use    Vaping Use: Never used   Substance and Sexual Activity    Alcohol use: No    Drug use: No     Social Determinants of Health     Financial Resource Strain:     Difficulty of Paying Living Expenses:    Food Insecurity:     Worried About Running Out of Food in the Last Year:     Ran Out of Food in the Last Year:    Transportation Needs:     Lack of Transportation (Medical):      Lack of Transportation (Non-Medical):    Physical Activity:     Days of Exercise per Week:     Minutes of Exercise per Session:    Stress:     Feeling of Stress :    Social Connections:     Frequency of Communication with Friends and Family:  Frequency of Social Gatherings with Friends and Family:     Attends Yazdanism Services:     Active Member of Clubs or Organizations:     Attends Club or Organization Meetings:     Marital Status:      Family History   Problem Relation Age of Onset    Diabetes Father     Kidney Disease Father     Diabetes Brother     Cancer Brother     Stroke Mother     Kidney Disease Brother     Cancer Brother     Other Brother         kidney stones    Cancer Sister     Cancer Sister      Current Outpatient Medications   Medication Sig Dispense Refill    calcium carbonate (OS-ANTONETTE) 500 mg calcium (1,250 mg) tablet Take  by mouth daily.  cyanocobalamin (Vitamin B-12) 1,000 mcg tablet Take 1,000 mcg by mouth daily.  cyclobenzaprine (FLEXERIL) 5 mg tablet TAKE 1 TABLET BY MOUTH AT BEDTIME AS NEEDED FOR MUSCLE SPASM      calcium carbonate (TUMS) 200 mg calcium (500 mg) chew Take 1 Tab by mouth Before breakfast and dinner. 200 Tab 5    ReliOn Prime Meter misc USE TO CHECK BLOOD SUGAR ONCE DAILY      ReliOn Prime Test Strips strip USE TO CHECK BLOOD SUGAR ONCE DAILY      Alcohol Prep Pads padm       diclofenac (VOLTAREN) 1 % gel       cholecalciferol (Vitamin D3) (1000 Units /25 mcg) tablet Take 1,000 Units by mouth daily.  losartan (COZAAR) 50 mg tablet 50 mg daily.  ferrous sulfate 325 mg (65 mg iron) tablet Take 325 mg by mouth.  glipiZIDE SR (GLUCOTROL XL) 5 mg CR tablet daily.  metFORMIN (GLUCOPHAGE) 1,000 mg tablet two (2) times a day.  latanoprost (XALATAN) 0.005 % ophthalmic solution Administer 1 Drop to both eyes nightly.  aspirin 81 mg chewable tablet Take 81 mg by mouth every other day.  amLODIPine (NORVASC) 10 mg tablet Take  by mouth daily.  cloNIDine HCl (CATAPRES) 0.1 mg tablet Take  by mouth two (2) times a day.  pravastatin (PRAVACHOL) 40 mg tablet Take 40 mg by mouth daily.       metoprolol (LOPRESSOR) 100 mg tablet Take  by mouth two (2) times a day.  meloxicam (MOBIC) 15 mg tablet  (Patient not taking: Reported on 5/19/2021)      OTHER daily. Calcium citrate + D ( Vit D3 500 units and Calcium 630  (Patient not taking: Reported on 9/16/2021)       Allergies   Allergen Reactions    Enalapril Swelling       Objective:  Visit Vitals  /84   Temp 98 °F (36.7 °C) (Oral)     Physical Exam:   General appearance - alert, well appearing, and in no distress  Mental status - alert, oriented to person, place, and time  EYE-CASEY, EOMI, corneas normal, no foreign bodies  ENT-ENT exam normal, no neck nodes or sinus tenderness  Nose - normal and patent, no erythema, discharge or polyps  Mouth - mucous membranes moist, pharynx normal without lesions  Neck - supple, no significant adenopathy   Chest - clear to auscultation, no wheezes, rales or rhonchi, symmetric air entry   Heart - normal rate, regular rhythm, normal S1, S2, no murmurs, rubs, clicks or gallops   Abdomen - soft, nontender, nondistended, no masses or organomegaly  Lymph- no adenopathy palpable  Ext-peripheral pulses normal, no pedal edema, no clubbing or cyanosis  Skin-Warm and dry. no hyperpigmentation, vitiligo, or suspicious lesions  Neuro -alert, oriented, normal speech, no focal findings or movement disorder noted  Neck-normal C-spine, no tenderness, full ROM without pain  Feet-no nail deformities or callus formation with good pulses noted      Results for orders placed or performed in visit on 93/18/78   METABOLIC PANEL, BASIC   Result Value Ref Range    Glucose 139 (H) 65 - 99 mg/dL    BUN 16 8 - 27 mg/dL    Creatinine 0.86 0.57 - 1.00 mg/dL    GFR est non-AA 68 >59 mL/min/1.73    GFR est AA 79 >59 mL/min/1.73    BUN/Creatinine ratio 19 12 - 28    Sodium 144 134 - 144 mmol/L    Potassium 4.0 3.5 - 5.2 mmol/L    Chloride 105 96 - 106 mmol/L    CO2 26 20 - 29 mmol/L    Calcium 11.0 (H) 8.7 - 10.3 mg/dL       Assessment/Plan:    ICD-10-CM ICD-9-CM    1.  Essential hypertension  I10 401.9 CBC W/O DIFF      METABOLIC PANEL, BASIC      AMB POC EKG ROUTINE W/ 12 LEADS, INTER & REP   2. Establishing care with new doctor, encounter for  Z76.89 V65.8    3. Needs flu shot  Z23 V04.81 INFLUENZA, HIGH DOSE SEASONAL   4. Type II or unspecified type diabetes mellitus with neurological manifestations, uncontrolled(250.62) (HCC)  E11.49 250.62 AMB POC HEMOGLOBIN A1C      AMB POC GLUCOSE BLOOD, BY GLUCOSE MONITORING DEVICE   5. Hypercholesteremia  E78.00 272.0 AMB POC LIPID PROFILE   6. Parathyroid adenoma  D35.1 227.1      Orders Placed This Encounter    Influenza Vaccine, High Dose, QUAD, IM (Fluzone 0.7ml 51944)    CBC W/O DIFF     Standing Status:   Future     Standing Expiration Date:   7/80/1790    METABOLIC PANEL, BASIC     Standing Status:   Future     Standing Expiration Date:   9/16/2022    AMB POC LIPID PROFILE    AMB POC HEMOGLOBIN A1C    AMB POC GLUCOSE BLOOD, BY GLUCOSE MONITORING DEVICE    AMB POC EKG ROUTINE W/ 12 LEADS, INTER & REP     Standing Status:   Future     Standing Expiration Date:   9/16/2022     Order Specific Question:   Reason for Exam:     Answer:   hypertension     lose weight, increase physical activity, follow low fat diet, follow low salt diet, call if any problems,Take 81mg aspirin daily  Patient Instructions   [facility]vd Activation    Thank you for requesting access to [facility]. Please follow the instructions below to securely access and download your online medical record. [facility] allows you to send messages to your doctor, view your test results, renew your prescriptions, schedule appointments, and more. How Do I Sign Up? 1. In your internet browser, go to [email or web address].[place]  2. Click on the First Time User? Click Here link in the Sign In box. You will be redirect to the New Member Sign Up page. 3. Enter your [facility] Access Code exactly as it appears below. You will not need to use this code after youve completed the sign-up process.  If you do not sign up before the expiration date, you must request a new code. In[employer]ium [employer] Access Code: [email or web address]  Expires: 10/31/2021  8:30 AM (This is the date your [employer] access code will )    4. Enter the last four digits of your Social Security Number (xxxx) and Date of Birth (mm/dd/yyyy) as indicated and click Submit. You will be taken to the next sign-up page. 5. Create a ABODOt ID. This will be your In[employer]ium [employer] login ID and cannot be changed, so think of one that is secure and easy to remember. 6. Create a In[employer]ium [employer] password. You can change your password at any time. 7. Enter your Password Reset Question and Answer. This can be used at a later time if you forget your password. 8. Enter your e-mail address. You will receive e-mail notification when new information is available in [address] 9. Click Sign Up. You can now view and download portions of your medical record. 10. Click the Download Summary menu link to download a portable copy of your medical information. Additional Information    If you have questions, please visit the Frequently Asked Questions section of the In[employer] website at [email or web address] Remember, In[employer]ium [employer] is NOT to be used for urgent needs. For medical emergencies, dial 911. Follow-up and Dispositions    · Return in about 4 weeks (around 10/14/2021), or if symptoms worsen or fail to improve. I have reviewed with the patient details of the assessment and plan and all questions were answered. Relevent patient education was performed. The most recent lab findings were reviewed with the patient. An After Visit Summary was printed and given to the patient.

## 2021-09-16 NOTE — PATIENT INSTRUCTIONS
Imnish Activation    Thank you for requesting access to Imnish. Please follow the instructions below to securely access and download your online medical record. Imnish allows you to send messages to your doctor, view your test results, renew your prescriptions, schedule appointments, and more. How Do I Sign Up? 1. In your internet browser, go to [email or web address]  2. Click on the First Time User? Click Here link in the Sign In box. You will be redirect to the New Member Sign Up page. 3. Enter your Imnish Access Code exactly as it appears below. You will not need to use this code after youve completed the sign-up process. If you do not sign up before the expiration date, you must request a new code. Imnish Access Code: [email or web address]  Expires: 10/31/2021  8:30 AM (This is the date your Imnish access code will )    4. Enter the last four digits of your Social Security Number (xxxx) and Date of Birth (mm/dd/yyyy) as indicated and click Submit. You will be taken to the next sign-up page. 5. Create a Imnish ID. This will be your Imnish login ID and cannot be changed, so think of one that is secure and easy to remember. 6. Create a Imnish password. You can change your password at any time. 7. Enter your Password Reset Question and Answer. This can be used at a later time if you forget your password. 8. Enter your e-mail address. You will receive e-mail notification when new information is available in [address] 9. Click Sign Up. You can now view and download portions of your medical record. 10. Click the Download Summary menu link to download a portable copy of your medical information. Additional Information    If you have questions, please visit the Frequently Asked Questions section of the Imnish website at [email or web address] Remember, Imnish is NOT to be used for urgent needs. For medical emergencies, dial 911.

## 2021-09-16 NOTE — PROGRESS NOTES
1. Have you been to the ER, urgent care clinic since your last visit? Hospitalized since your last visit?no    2. Have you seen or consulted any other health care providers outside of the [address] [place] [address] since your last visit? Include any pap smears or colon screening. No    Chief Complaint   Patient presents with   [address]     Per Dr. Monica Fairchild.,  verbal order given for needed amb poc labs. 3 most recent PHQ Screens 9/16/2021   PHQ Not Done -   Little interest or pleasure in doing things Not at all   Feeling down, depressed, irritable, or hopeless Not at all   Total Score PHQ 2 0       After obtaining consent, and per verbal order from Dr. Mercedes Goodman, patient received influenza vaccine given by Nai Almaguer LPN in Right Deltoid. Influenza Vaccine 0.5 mL IM now. Patient was observed for 10 minutes post injection. Patient tolerated injection well.

## 2021-10-14 ENCOUNTER — OFFICE VISIT (OUTPATIENT)
Dept: INTERNAL MEDICINE CLINIC | Age: 71
End: 2021-10-14
Payer: MEDICARE

## 2021-10-14 VITALS
SYSTOLIC BLOOD PRESSURE: 124 MMHG | RESPIRATION RATE: 16 BRPM | BODY MASS INDEX: 31.07 KG/M2 | WEIGHT: 182 LBS | HEART RATE: 68 BPM | DIASTOLIC BLOOD PRESSURE: 78 MMHG | HEIGHT: 64 IN | OXYGEN SATURATION: 98 % | TEMPERATURE: 98 F

## 2021-10-14 DIAGNOSIS — E78.00 HYPERCHOLESTEREMIA: ICD-10-CM

## 2021-10-14 DIAGNOSIS — I10 ESSENTIAL HYPERTENSION: ICD-10-CM

## 2021-10-14 DIAGNOSIS — E83.52 HYPERCALCEMIA: Primary | ICD-10-CM

## 2021-10-14 DIAGNOSIS — D35.1 PARATHYROID ADENOMA: ICD-10-CM

## 2021-10-14 DIAGNOSIS — E53.8 B12 DEFICIENCY: ICD-10-CM

## 2021-10-14 PROCEDURE — 1101F PT FALLS ASSESS-DOCD LE1/YR: CPT | Performed by: INTERNAL MEDICINE

## 2021-10-14 PROCEDURE — G8536 NO DOC ELDER MAL SCRN: HCPCS | Performed by: INTERNAL MEDICINE

## 2021-10-14 PROCEDURE — G8417 CALC BMI ABV UP PARAM F/U: HCPCS | Performed by: INTERNAL MEDICINE

## 2021-10-14 PROCEDURE — 3017F COLORECTAL CA SCREEN DOC REV: CPT | Performed by: INTERNAL MEDICINE

## 2021-10-14 PROCEDURE — G8510 SCR DEP NEG, NO PLAN REQD: HCPCS | Performed by: INTERNAL MEDICINE

## 2021-10-14 PROCEDURE — 1090F PRES/ABSN URINE INCON ASSESS: CPT | Performed by: INTERNAL MEDICINE

## 2021-10-14 PROCEDURE — 99214 OFFICE O/P EST MOD 30 MIN: CPT | Performed by: INTERNAL MEDICINE

## 2021-10-14 PROCEDURE — G8427 DOCREV CUR MEDS BY ELIG CLIN: HCPCS | Performed by: INTERNAL MEDICINE

## 2021-10-14 PROCEDURE — G8399 PT W/DXA RESULTS DOCUMENT: HCPCS | Performed by: INTERNAL MEDICINE

## 2021-10-14 RX ORDER — ZINC GLUCONATE 10 MG
LOZENGE ORAL
COMMUNITY
End: 2022-05-04

## 2021-10-14 RX ORDER — LANOLIN ALCOHOL/MO/W.PET/CERES
1000 CREAM (GRAM) TOPICAL DAILY
COMMUNITY
End: 2022-05-04

## 2021-10-14 NOTE — PATIENT INSTRUCTIONS
Dishcrawl Activation    Thank you for requesting access to [facility]rawl. Please follow the instructions below to securely access and download your online medical record. Dishcrawl allows you to send messages to your doctor, view your test results, renew your prescriptions, schedule appointments, and more. How Do I Sign Up? 1. In your internet browser, go to [email or web address]  2. Click on the First Time User? Click Here link in the Sign In box. You will be redirect to the New Member Sign Up page. 3. Enter your Dishcrawl Access Code exactly as it appears below. You will not need to use this code after youve completed the sign-up process. If you do not sign up before the expiration date, you must request a new code. Dishcrawl Access Code: [email or web address]  Expires: 10/31/2021  8:30 AM (This is the date your Dishcrawl access code will )    4. Enter the last four digits of your Social Security Number (xxxx) and Date of Birth (mm/dd/yyyy) as indicated and click Submit. You will be taken to the next sign-up page. 5. Create a Dishcrawl ID. This will be your Dishcrawl login ID and cannot be changed, so think of one that is secure and easy to remember. 6. Create a Dishcrawl password. You can change your password at any time. 7. Enter your Password Reset Question and Answer. This can be used at a later time if you forget your password. 8. Enter your e-mail address. You will receive e-mail notification when new information is available in [address] 9. Click Sign Up. You can now view and download portions of your medical record. 10. Click the Download Summary menu link to download a portable copy of your medical information. Additional Information    If you have questions, please visit the Frequently Asked Questions section of the Dishcrawl website at [email or web address] Remember, Dishcrawl is NOT to be used for urgent needs. For medical emergencies, dial 911.

## 2021-10-14 NOTE — PROGRESS NOTES
1. Have you been to the ER, urgent care clinic since your last visit? Hospitalized since your last visit?no    2. Have you seen or consulted any other health care providers outside of the [address] [place] [address] since your last visit? Include any pap smears or colon screening.  No    Chief Complaint   Patient presents with    Thyroid Problem    Annual Wellness Visit

## 2021-10-14 NOTE — PROGRESS NOTES
Burt Marrufo is a 70 y.o. female and presents with Thyroid Problem and Annual Wellness Visit  . Subjective:  Subjective:  She has a history of a parathyroid adenoma and states she is to have surgery hopefully soon  She is followed by endocrinology and has a surgery evaluation soon  She has recently seen endo. she has provided her recent notes  Her last calcium level was 11.1    She also has b12 deficiency    Hypertension Review:  The patient has essential hypertension  Diet and Lifestyle: generally follows a  low sodium diet, exercises sporadically  Home BP Monitoring: is not measured at home. Pertinent ROS: taking medications as instructed, no medication side effects noted, no TIA's, no chest pain on exertion, no dyspnea on exertion, no swelling of ankles. Dyslipidemia Review:  Patient presents for evaluation of lipids. Compliance with treatment thus far has been excellent. A repeat fasting lipid profile was not done. The patient does not use medications that may worsen dyslipidemias (corticosteroids, progestins, anabolic steroids, amiodarone, cyclosporine, olanzapine). The patient exercises some      Diabetes Mellitus Review:  She has diabetes mellitus. Diabetic ROS - medication compliance: compliant all of the time, diabetic diet compliance: compliant all of the time, home glucose monitoring: is performed. Known diabetic complications: none  Cardiovascular risk factors: family history, dyslipidemia, diabetes mellitus, obesity, hypertension  Current diabetic medications include oral agents   Eye exam current (within one year): no  Weight trend: stable  Prior visit with dietician: no  Current diet: \"healthy\" diet  in general  Current exercise: walking  Current monitoring regimen: home blood tests - daily  Home blood sugar records: trend: stable  Any episodes of hypoglycemia? no  Is She on ACE inhibitor or angiotensin II receptor blocker?    Yes         Burt Marrufo is a 70 y.o. female and presents for annual Medicare Wellness Visit. Problem List: Reviewed with patient and discussed risk factors. Patient Active Problem List   Diagnosis Code    [facility] E21.0       Current medical providers:  Patient Care Team:  AMBER Palomino as PCP - General (Nurse Practitioner)  Ophelia Shane MD as Surgeon (General Surgery)  Kalli Trujillo MD as Consulting Provider (Endocrinology)    PSH: Reviewed with patient  Past Surgical History:   Procedure Laterality Date    HX CHOLECYSTECTOMY      HX FRACTURE TX Left     HX ORTHOPAEDIC      lumbar discectomy         SH: Reviewed with patient  Social History     Tobacco Use    Smoking status: Never Smoker    Smokeless tobacco: Never Used   Vaping Use    Vaping Use: Never used   Substance Use Topics    Alcohol use: No    Drug use: No       FH: Reviewed with patient  Family History   Problem Relation Age of Onset    Diabetes Father     Kidney Disease Father     Diabetes Brother     Cancer Brother     Stroke Mother     Kidney Disease Brother     Cancer Brother     Other Brother         kidney stones    Cancer Sister     Cancer Sister        Medications/Allergies: Reviewed with patient  Current Outpatient Medications on File Prior to Visit   Medication Sig Dispense Refill    cyanocobalamin 1,000 mcg tablet Take 1,000 mcg by mouth daily.  magnesium 250 mg tab Take  by mouth.  cyanocobalamin (Vitamin B-12) 1,000 mcg tablet Take 1,000 mcg by mouth daily.  calcium carbonate (TUMS) 200 mg calcium (500 mg) chew Take 1 Tab by mouth Before breakfast and dinner. 200 Tab 5    ReliOn Prime Meter misc USE TO CHECK BLOOD SUGAR ONCE DAILY      ReliOn Prime Test Strips strip USE TO CHECK BLOOD SUGAR ONCE DAILY      Alcohol Prep Pads padm       losartan (COZAAR) 50 mg tablet 50 mg daily.  ferrous sulfate 325 mg (65 mg iron) tablet Take 325 mg by mouth.       glipiZIDE SR (GLUCOTROL XL) 5 mg CR tablet daily.      metFORMIN (GLUCOPHAGE) 1,000 mg tablet two (2) times a day.  latanoprost (XALATAN) 0.005 % ophthalmic solution Administer 1 Drop to both eyes nightly.  aspirin 81 mg chewable tablet Take 81 mg by mouth every other day.  amLODIPine (NORVASC) 10 mg tablet Take  by mouth daily.  cloNIDine HCl (CATAPRES) 0.1 mg tablet Take  by mouth two (2) times a day.  pravastatin (PRAVACHOL) 40 mg tablet Take 40 mg by mouth daily.  calcium carbonate (OS-ANTONETTE) 500 mg calcium (1,250 mg) tablet Take  by mouth daily. (Patient not taking: Reported on 10/14/2021)      cyclobenzaprine (FLEXERIL) 5 mg tablet TAKE 1 TABLET BY MOUTH AT BEDTIME AS NEEDED FOR MUSCLE SPASM (Patient not taking: Reported on 10/14/2021)      diclofenac (VOLTAREN) 1 % gel  (Patient not taking: Reported on 10/14/2021)      meloxicam (MOBIC) 15 mg tablet  (Patient not taking: Reported on 5/19/2021)      cholecalciferol (Vitamin D3) (1000 Units /25 mcg) tablet Take 1,000 Units by mouth daily. (Patient not taking: Reported on 10/14/2021)      OTHER daily. Calcium citrate + D ( Vit D3 500 units and Calcium 630  (Patient not taking: Reported on 9/16/2021)      metoprolol (LOPRESSOR) 100 mg tablet Take  by mouth two (2) times a day. No current facility-administered medications on file prior to visit. Allergies   Allergen Reactions    Enalapril Swelling       Objective:  Visit Vitals  /78   Pulse 68   Temp 98 °F (36.7 °C) (Oral)   Resp 16   Ht 5' 4\" (1.626 m)   Wt 182 lb (82.6 kg)   SpO2 98%   BMI 31.24 kg/m²    Body mass index is 31.24 kg/m².     Assessment of cognitive impairment: Alert and oriented x 3    Depression Screen:   3 most recent PHQ Screens 10/14/2021   PHQ Not Done -   Little interest or pleasure in doing things Not at all   Feeling down, depressed, irritable, or hopeless Not at all   Total Score PHQ 2 0     Depression Review:  Patient is seen for screen of depression,denies anhedonia, weight gain, insomnia, hypersomnia, psychomotor agitation, psychomotor retardation, fatigue, feelings of worthlessness/guilt, difficulty concentrating, hopelessness, impaired memory and recurrent thoughts of death Treatment includes no medication   She denies recurrent thoughts of death and suicidal thoughts without plan. Fall Risk Assessment:    Fall Risk Assessment, last 12 mths 10/14/2021   Able to walk? Yes   Fall in past 12 months? 0   Do you feel unsteady? 0   Are you worried about falling 0       Functional Ability:   Does the patient exhibit a steady gait? yes   How long did it take the patient to get up and walk from a sitting position? seconds   Is the patient self reliant?  (ie can do own laundry, meals, household chores)  yes     Does the patient handle his/her own medications? yes     Does the patient handle his/her own money? yes     Is the patients home safe (ie good lighting, handrails on stairs and bath, etc.)? yes     Did you notice or did patient express any hearing difficulties? no     Did you notice or did patient express any vision difficulties?   no     Were distance and reading eye charts used? no       Advance Care Planning:   Patient was offered the opportunity to discuss advance care planning:  yes     Does patient have an Advance Directive:  no   If no, did you provide information on Caring Connections?   yes       Plan:      Orders Placed This Encounter    cyanocobalamin 1,000 mcg tablet    magnesium 250 mg tab       Health Maintenance   Topic Date Due    Hepatitis C Screening  Never done    Foot Exam Q1  Never done    Eye Exam Retinal or Dilated  Never done    Shingrix Vaccine Age 50> (1 of 2) Never done    MICROALBUMIN Q1  08/15/2018    Colorectal Cancer Screening Combo  02/18/2020    Breast Cancer Screen Mammogram  10/17/2021    A1C test (Diabetic or Prediabetic)  09/18/2022    Lipid Screen  09/18/2022    Medicare Yearly Exam  10/15/2022    DTaP/Tdap/Td series (2 - Td or Tdap) 04/02/2025    Bone Densitometry (Dexa) Screening  Completed    Flu Vaccine  Completed    COVID-19 Vaccine  Completed    Pneumococcal 65+ years  Completed       *Patient verbalized understanding and agreement with the plan. A copy of the After Visit Summary with personalized health plan was given to the patient today. Review of Systems  Constitutional: negative for fevers, chills, anorexia and weight loss  Eyes:   negative for visual disturbance and irritation  ENT:   negative for tinnitus,sore throat,nasal congestion,ear pains. hoarseness  Respiratory:  negative for cough, hemoptysis, dyspnea,wheezing  CV:   negative for chest pain, palpitations, lower extremity edema  GI:   negative for nausea, vomiting, diarrhea, abdominal pain,melena  Endo:               negative for polyuria,polydipsia,polyphagia,heat intolerance  Genitourinary: negative for frequency, dysuria and hematuria  Integument:  negative for rash and pruritus  Hematologic:  negative for easy bruising and gum/nose bleeding  Musculoskel: negative for myalgias, arthralgias, back pain, muscle weakness, joint pain  Neurological:  negative for headaches, dizziness, vertigo, memory problems and gait   Behavl/Psych: negative for feelings of anxiety, depression, mood changes    Past Medical History:   Diagnosis Date    Arthritis     Diabetes ([address]     Fracture     HTN (hypertension)     Hyperlipemia      Past Surgical History:   Procedure Laterality Date    HX CHOLECYSTECTOMY      HX FRACTURE TX Left     HX ORTHOPAEDIC      lumbar discectomy      Social History     Socioeconomic History    Marital status:      Spouse name: Not on file    Number of children: Not on file    Years of education: Not on file    Highest education level: Not on file   Tobacco Use    Smoking status: Never Smoker    Smokeless tobacco: Never Used   Vaping Use    Vaping Use: Never used   Substance and Sexual Activity    Alcohol use: No    Drug use: No     Social Determinants of Health     Financial Resource Strain:     Difficulty of Paying Living Expenses:    Food Insecurity:     Worried About Running Out of Food in the Last Year:     [address] in the Last Year:    Transportation Needs:     Lack of Transportation (Medical):  Lack of Transportation (Non-Medical):    Physical Activity:     Days of Exercise per Week:     Minutes of Exercise per Session:    Stress:     Feeling of Stress :    Social Connections:     Frequency of Communication with Friends and Family:     Frequency of Social Gatherings with Friends and Family:     Attends [employer] Services:     Active Member of Clubs or Organizations:     Attends Club or Organization Meetings:     Marital Status:      Family History   Problem Relation Age of Onset    Diabetes Father     Kidney Disease Father     Diabetes Brother     Cancer Brother     Stroke Mother     Kidney Disease Brother     Cancer Brother     Other Brother         kidney stones    Cancer Sister     Cancer Sister      Current Outpatient Medications   Medication Sig Dispense Refill    cyanocobalamin 1,000 mcg tablet Take 1,000 mcg by mouth daily.  magnesium 250 mg tab Take  by mouth.  cyanocobalamin (Vitamin B-12) 1,000 mcg tablet Take 1,000 mcg by mouth daily.  calcium carbonate (TUMS) 200 mg calcium (500 mg) chew Take 1 Tab by mouth Before breakfast and dinner. 200 Tab 5    ReliOn Prime Meter misc USE TO CHECK BLOOD SUGAR ONCE DAILY      ReliOn Prime Test Strips strip USE TO CHECK BLOOD SUGAR ONCE DAILY      Alcohol Prep Pads padm       losartan (COZAAR) 50 mg tablet 50 mg daily.  ferrous sulfate 325 mg (65 mg iron) tablet Take 325 mg by mouth.  glipiZIDE SR (GLUCOTROL XL) 5 mg CR tablet daily.  metFORMIN (GLUCOPHAGE) 1,000 mg tablet two (2) times a day.  latanoprost (XALATAN) 0.005 % ophthalmic solution Administer 1 Drop to both eyes nightly.       aspirin 81 mg chewable tablet Take 81 mg by mouth every other day.  amLODIPine (NORVASC) 10 mg tablet Take  by mouth daily.  cloNIDine HCl (CATAPRES) 0.1 mg tablet Take  by mouth two (2) times a day.  pravastatin (PRAVACHOL) 40 mg tablet Take 40 mg by mouth daily.  calcium carbonate (OS-ANTONETTE) 500 mg calcium (1,250 mg) tablet Take  by mouth daily. (Patient not taking: Reported on 10/14/2021)      cyclobenzaprine (FLEXERIL) 5 mg tablet TAKE 1 TABLET BY MOUTH AT BEDTIME AS NEEDED FOR MUSCLE SPASM (Patient not taking: Reported on 10/14/2021)      diclofenac (VOLTAREN) 1 % gel  (Patient not taking: Reported on 10/14/2021)      meloxicam (MOBIC) 15 mg tablet  (Patient not taking: Reported on 5/19/2021)      cholecalciferol (Vitamin D3) (1000 Units /25 mcg) tablet Take 1,000 Units by mouth daily. (Patient not taking: Reported on 10/14/2021)      OTHER daily. Calcium citrate + D ( Vit D3 500 units and Calcium 630  (Patient not taking: Reported on 9/16/2021)      metoprolol (LOPRESSOR) 100 mg tablet Take  by mouth two (2) times a day.        Allergies   Allergen Reactions    Enalapril Swelling       Objective:  Visit Vitals  /78   Pulse 68   Temp 98 °F (36.7 °C) (Oral)   Resp 16   Ht 5' 4\" (1.626 m)   Wt 182 lb (82.6 kg)   SpO2 98%   BMI 31.24 kg/m²     Physical Exam:   General appearance - alert, well appearing, and in no distress  Mental status - alert, oriented to person, place, and time  EYE-CASEY, EOMI, corneas normal, no foreign bodies  ENT-ENT exam normal, no neck nodes or sinus tenderness  Nose - normal and patent, no erythema, discharge or polyps  Mouth - mucous membranes moist, pharynx normal without lesions  Neck - supple, no significant adenopathy   Chest - clear to auscultation, no wheezes, rales or rhonchi, symmetric air entry   Heart - normal rate, regular rhythm, normal S1, S2, no murmurs, rubs, clicks or gallops   Abdomen - soft, nontender, nondistended, no masses or organomegaly  Lymph- no adenopathy palpable  Ext-peripheral pulses normal, no pedal edema, no clubbing or cyanosis  Skin-Warm and dry. no hyperpigmentation, vitiligo, or suspicious lesions  Neuro -alert, oriented, normal speech, no focal findings or movement disorder noted  Neck-normal C-spine, no tenderness, full ROM without pain  Feet-no nail deformities or callus formation with good pulses noted      Results for orders placed or performed in visit on 09/16/21   AMB POC LIPID PROFILE   Result Value Ref Range    Cholesterol (POC) 109     Triglycerides (POC) 92     HDL Cholesterol (POC) 39     Non-HDL Cholesterol 70     LDL Cholesterol (POC) 51 MG/DL    TChol/HDL Ratio (POC) 2.8    AMB POC HEMOGLOBIN A1C   Result Value Ref Range    Hemoglobin A1c (POC) 6.4 %   AMB POC GLUCOSE BLOOD, BY GLUCOSE MONITORING DEVICE   Result Value Ref Range    Glucose  MG/DL       Assessment/Plan:    ICD-10-CM ICD-9-CM    1. Hypercalcemia  E83.52 275.42    2. Essential hypertension  I10 401.9    3. Hypercholesteremia  E78.00 272.0    4. Parathyroid adenoma  D35.1 227.1    5. B12 deficiency  E53.8 266.2      Orders Placed This Encounter    cyanocobalamin 1,000 mcg tablet     Sig: Take 1,000 mcg by mouth daily.  magnesium 250 mg tab     Sig: Take  by mouth. lose weight, call if any problems  Patient Instructions   [facility] [employer] Activation    Thank you for requesting access to [facility]. Please follow the instructions below to securely access and download your online medical record. [facility] allows you to send messages to your doctor, view your test results, renew your prescriptions, schedule appointments, and more. How Do I Sign Up? 1. In your internet browser, go to [email or web address]  2. Click on the First Time User? Click Here link in the Sign In box. You will be redirect to the New Member Sign Up page. 3. Enter your [employer] Access Code exactly as it appears below. You will not need to use this code after youve completed the sign-up process. If you do not sign up before the expiration date, you must request a new code. [employer] Access Code: [email or web address]  Expires: 10/31/2021  8:30 AM (This is the date your [employer] access code will )    4. Enter the last four digits of your Social Security Number (xxxx) and Date of Birth (mm/dd/yyyy) as indicated and click Submit. You will be taken to the next sign-up page. 5. Create a [employer]t ID. This will be your [employer] login ID and cannot be changed, so think of one that is secure and easy to remember. 6. Create a [employer] password. You can change your password at any time. 7. Enter your Password Reset Question and Answer. This can be used at a later time if you forget your password. 8. Enter your e-mail address. You will receive e-mail notification when new information is available in [address]. 9. Click Sign Up. You can now view and download portions of your medical record. 10. Click the Download Summary menu link to download a portable copy of your medical information. Additional Information    If you have questions, please visit the Frequently Asked Questions section of the [employer] website at [email or web address] Remember, [employer] is NOT to be used for urgent needs. For medical emergencies, dial 911. I have reviewed with the patient details of the assessment and plan and all questions were answered. Relevent patient education was performed    An After Visit Summary was printed and given to the patient.

## 2021-10-27 RX ORDER — METFORMIN HYDROCHLORIDE 1000 MG/1
1000 TABLET ORAL 2 TIMES DAILY
Qty: 180 TABLET | Refills: 3 | Status: SHIPPED | OUTPATIENT
Start: 2021-10-27 | End: 2021-10-28 | Stop reason: SDUPTHER

## 2021-10-27 RX ORDER — LOSARTAN POTASSIUM 50 MG/1
50 TABLET ORAL DAILY
Qty: 90 TABLET | Refills: 3 | Status: SHIPPED | OUTPATIENT
Start: 2021-10-27 | End: 2021-10-28 | Stop reason: SDUPTHER

## 2021-10-27 RX ORDER — METOPROLOL TARTRATE 100 MG/1
100 TABLET ORAL 2 TIMES DAILY
Qty: 180 TABLET | Refills: 3 | Status: SHIPPED | OUTPATIENT
Start: 2021-10-27 | End: 2022-04-08 | Stop reason: SDUPTHER

## 2021-10-27 RX ORDER — PRAVASTATIN SODIUM 40 MG/1
40 TABLET ORAL DAILY
Qty: 90 TABLET | Refills: 3 | Status: SHIPPED | OUTPATIENT
Start: 2021-10-27 | End: 2021-10-28 | Stop reason: SDUPTHER

## 2021-10-29 RX ORDER — METFORMIN HYDROCHLORIDE 1000 MG/1
1000 TABLET ORAL 2 TIMES DAILY
Qty: 180 TABLET | Refills: 3 | Status: SHIPPED | OUTPATIENT
Start: 2021-10-29 | End: 2022-05-02 | Stop reason: SDUPTHER

## 2021-10-29 RX ORDER — PRAVASTATIN SODIUM 40 MG/1
40 TABLET ORAL DAILY
Qty: 90 TABLET | Refills: 3 | Status: SHIPPED | OUTPATIENT
Start: 2021-10-29 | End: 2022-09-15

## 2021-10-29 RX ORDER — LOSARTAN POTASSIUM 50 MG/1
50 TABLET ORAL DAILY
Qty: 90 TABLET | Refills: 3 | Status: SHIPPED | OUTPATIENT
Start: 2021-10-29 | End: 2022-08-02 | Stop reason: SDUPTHER

## 2021-12-30 ENCOUNTER — OFFICE VISIT (OUTPATIENT)
Dept: INTERNAL MEDICINE CLINIC | Age: 71
End: 2021-12-30
Payer: MEDICARE

## 2021-12-30 VITALS
TEMPERATURE: 97.6 F | DIASTOLIC BLOOD PRESSURE: 78 MMHG | HEART RATE: 73 BPM | OXYGEN SATURATION: 98 % | BODY MASS INDEX: 31.41 KG/M2 | SYSTOLIC BLOOD PRESSURE: 120 MMHG | HEIGHT: 64 IN | RESPIRATION RATE: 18 BRPM | WEIGHT: 184 LBS

## 2021-12-30 DIAGNOSIS — E78.00 HYPERCHOLESTEREMIA: ICD-10-CM

## 2021-12-30 DIAGNOSIS — Z12.31 ENCOUNTER FOR SCREENING MAMMOGRAM FOR MALIGNANT NEOPLASM OF BREAST: ICD-10-CM

## 2021-12-30 DIAGNOSIS — G56.01 CARPAL TUNNEL SYNDROME OF RIGHT WRIST: ICD-10-CM

## 2021-12-30 DIAGNOSIS — E21.0 PRIMARY HYPERPARATHYROIDISM (HCC): ICD-10-CM

## 2021-12-30 DIAGNOSIS — E83.52 HYPERCALCEMIA: ICD-10-CM

## 2021-12-30 DIAGNOSIS — I10 ESSENTIAL HYPERTENSION: Primary | ICD-10-CM

## 2021-12-30 DIAGNOSIS — E11.49 TYPE II OR UNSPECIFIED TYPE DIABETES MELLITUS WITH NEUROLOGICAL MANIFESTATIONS, UNCONTROLLED(250.62) (HCC): ICD-10-CM

## 2021-12-30 PROBLEM — E11.9 TYPE 2 DIABETES MELLITUS (HCC): Status: ACTIVE | Noted: 2021-12-30

## 2021-12-30 LAB
ALBUMIN SERPL-MCNC: 4 G/DL (ref 3.5–5)
ALBUMIN/GLOB SERPL: 1.1 {RATIO} (ref 1.1–2.2)
ALP SERPL-CCNC: 108 U/L (ref 45–117)
ALT SERPL-CCNC: 18 U/L (ref 12–78)
ANION GAP SERPL CALC-SCNC: 7 MMOL/L (ref 5–15)
AST SERPL-CCNC: 9 U/L (ref 15–37)
BILIRUB SERPL-MCNC: 0.3 MG/DL (ref 0.2–1)
BUN SERPL-MCNC: 16 MG/DL (ref 6–20)
BUN/CREAT SERPL: 18 (ref 12–20)
CALCIUM SERPL-MCNC: 11.5 MG/DL (ref 8.5–10.1)
CHLORIDE SERPL-SCNC: 108 MMOL/L (ref 97–108)
CHOLEST SERPL-MCNC: 154 MG/DL
CO2 SERPL-SCNC: 27 MMOL/L (ref 21–32)
CREAT SERPL-MCNC: 0.89 MG/DL (ref 0.55–1.02)
ERYTHROCYTE [DISTWIDTH] IN BLOOD BY AUTOMATED COUNT: 13.8 % (ref 11.5–14.5)
GLOBULIN SER CALC-MCNC: 3.5 G/DL (ref 2–4)
GLUCOSE POC: 151 MG/DL
GLUCOSE SERPL-MCNC: 156 MG/DL (ref 65–100)
HBA1C MFR BLD HPLC: 6.6 %
HCT VFR BLD AUTO: 43.5 % (ref 35–47)
HDLC SERPL-MCNC: 45 MG/DL
HGB BLD-MCNC: 13.5 G/DL (ref 11.5–16)
LDL CHOLESTEROL POC: 89 MG/DL
MCH RBC QN AUTO: 28 PG (ref 26–34)
MCHC RBC AUTO-ENTMCNC: 31 G/DL (ref 30–36.5)
MCV RBC AUTO: 90.2 FL (ref 80–99)
NON-HDL GOAL (POC): 109
NRBC # BLD: 0 K/UL (ref 0–0.01)
NRBC BLD-RTO: 0 PER 100 WBC
PLATELET # BLD AUTO: 246 K/UL (ref 150–400)
PMV BLD AUTO: 12.5 FL (ref 8.9–12.9)
POTASSIUM SERPL-SCNC: 4.5 MMOL/L (ref 3.5–5.1)
PROT SERPL-MCNC: 7.5 G/DL (ref 6.4–8.2)
RBC # BLD AUTO: 4.82 M/UL (ref 3.8–5.2)
SODIUM SERPL-SCNC: 142 MMOL/L (ref 136–145)
TCHOL/HDL RATIO (POC): 3.5
TRIGL SERPL-MCNC: 102 MG/DL
WBC # BLD AUTO: 9.1 K/UL (ref 3.6–11)

## 2021-12-30 PROCEDURE — G8417 CALC BMI ABV UP PARAM F/U: HCPCS | Performed by: INTERNAL MEDICINE

## 2021-12-30 PROCEDURE — 99214 OFFICE O/P EST MOD 30 MIN: CPT | Performed by: INTERNAL MEDICINE

## 2021-12-30 PROCEDURE — 83036 HEMOGLOBIN GLYCOSYLATED A1C: CPT | Performed by: INTERNAL MEDICINE

## 2021-12-30 PROCEDURE — 1090F PRES/ABSN URINE INCON ASSESS: CPT | Performed by: INTERNAL MEDICINE

## 2021-12-30 PROCEDURE — G8399 PT W/DXA RESULTS DOCUMENT: HCPCS | Performed by: INTERNAL MEDICINE

## 2021-12-30 PROCEDURE — 2022F DILAT RTA XM EVC RTNOPTHY: CPT | Performed by: INTERNAL MEDICINE

## 2021-12-30 PROCEDURE — 3017F COLORECTAL CA SCREEN DOC REV: CPT | Performed by: INTERNAL MEDICINE

## 2021-12-30 PROCEDURE — G8510 SCR DEP NEG, NO PLAN REQD: HCPCS | Performed by: INTERNAL MEDICINE

## 2021-12-30 PROCEDURE — G8536 NO DOC ELDER MAL SCRN: HCPCS | Performed by: INTERNAL MEDICINE

## 2021-12-30 PROCEDURE — G9899 SCRN MAM PERF RSLTS DOC: HCPCS | Performed by: INTERNAL MEDICINE

## 2021-12-30 PROCEDURE — 80061 LIPID PANEL: CPT | Performed by: INTERNAL MEDICINE

## 2021-12-30 PROCEDURE — 3044F HG A1C LEVEL LT 7.0%: CPT | Performed by: INTERNAL MEDICINE

## 2021-12-30 PROCEDURE — 1101F PT FALLS ASSESS-DOCD LE1/YR: CPT | Performed by: INTERNAL MEDICINE

## 2021-12-30 PROCEDURE — G8427 DOCREV CUR MEDS BY ELIG CLIN: HCPCS | Performed by: INTERNAL MEDICINE

## 2021-12-30 PROCEDURE — 82962 GLUCOSE BLOOD TEST: CPT | Performed by: INTERNAL MEDICINE

## 2021-12-30 NOTE — PROGRESS NOTES
Chief Complaint   Patient presents with    Hypertension    Diabetes     3 most recent PHQ Screens 12/30/2021   PHQ Not Done -   Little interest or pleasure in doing things Not at all   Feeling down, depressed, irritable, or hopeless Not at all   Total Score PHQ 2 0     1. Have you been to the ER, urgent care clinic since your last visit? Hospitalized since your last visit?no    2. Have you seen or consulted any other health care providers outside of the [address] [place] [address] since your last visit? Include any pap smears or colon screening.  no

## 2021-12-30 NOTE — PATIENT INSTRUCTIONS
[facility] Activation    Thank you for requesting access to [employer]. Please follow the instructions below to securely access and download your online medical record. [employer] allows you to send messages to your doctor, view your test results, renew your prescriptions, schedule appointments, and more. How Do I Sign Up? 1. In your internet browser, go to [email or web address]  2. Click on the First Time User? Click Here link in the Sign In box. You will be redirect to the New Member Sign Up page. 3. Enter your [employer] Access Code exactly as it appears below. You will not need to use this code after youve completed the sign-up process. If you do not sign up before the expiration date, you must request a new code. [employer] Access Code: [email or web address]  Expires: 2022  2:47 PM (This is the date your [employer] access code will )    4. Enter the last four digits of your Social Security Number (xxxx) and Date of Birth (mm/dd/yyyy) as indicated and click Submit. You will be taken to the next sign-up page. 5. Create a [employer] ID. This will be your [employer] login ID and cannot be changed, so think of one that is secure and easy to remember. 6. Create a [employer] password. You can change your password at any time. 7. Enter your Password Reset Question and Answer. This can be used at a later time if you forget your password. 8. Enter your e-mail address. You will receive e-mail notification when new information is available in [address] 9. Click Sign Up. You can now view and download portions of your medical record. 10. Click the Download Summary menu link to download a portable copy of your medical information. Additional Information    If you have questions, please visit the Frequently Asked Questions section of the [employer] website at [email or web address] Remember, [employer] is NOT to be used for urgent needs. For medical emergencies, dial 911.

## 2021-12-30 NOTE — PROGRESS NOTES
Palma Cortez is a 70 y.o. female and presents with Hypertension and Diabetes  . Subjective:    Wrist Pain Review:  Patient complains of right wrist pain. The pain is moderate, worsens with movement, and some relief by rest.  There is associated numbness in the right fingers. Pain has been present for some There is not a history of injury, strain, overuse. She has a history of a parathyroid adenoma and states she is to have surgery hopefully soon  She is followed by endocrinology and has a surgery evaluation soon  She has recently seen endo. she has provided her recent notes  Her last calcium level was 11.1    She also has b12 deficiency  She is under treatment    Hypertension Review:  The patient has essential hypertension  Diet and Lifestyle: generally follows a  low sodium diet, exercises sporadically  Home BP Monitoring: is not measured at home. Pertinent ROS: taking medications as instructed, no medication side effects noted, no TIA's, no chest pain on exertion, no dyspnea on exertion, no swelling of ankles. Dyslipidemia Review:  Patient presents for evaluation of lipids. Compliance with treatment thus far has been excellent. A repeat fasting lipid profile was not done. The patient does not use medications that may worsen dyslipidemias (corticosteroids, progestins, anabolic steroids, amiodarone, cyclosporine, olanzapine). The patient exercises some      Diabetes Mellitus Review:  She has diabetes mellitus. Diabetic ROS - medication compliance: compliant all of the time, diabetic diet compliance: compliant all of the time, home glucose monitoring: is performed.    Known diabetic complications: none  Cardiovascular risk factors: family history, dyslipidemia, diabetes mellitus, obesity, hypertension  Current diabetic medications include oral agents   Eye exam current (within one year): no  Weight trend: stable  Prior visit with dietician: no  Current diet: \"healthy\" diet  in general  Current exercise: walking  Current monitoring regimen: home blood tests - daily  Home blood sugar records: trend: stable  Any episodes of hypoglycemia? no  Is She on ACE inhibitor or angiotensin II receptor blocker? Yes         Review of Systems  Constitutional: negative for fevers, chills, anorexia and weight loss  Eyes:   negative for visual disturbance and irritation  ENT:   negative for tinnitus,sore throat,nasal congestion,ear pains. hoarseness  Respiratory:  negative for cough, hemoptysis, dyspnea,wheezing  CV:   negative for chest pain, palpitations, lower extremity edema  GI:   negative for nausea, vomiting, diarrhea, abdominal pain,melena  Endo:               negative for polyuria,polydipsia,polyphagia,heat intolerance  Genitourinary: negative for frequency, dysuria and hematuria  Integument:  negative for rash and pruritus  Hematologic:  negative for easy bruising and gum/nose bleeding  Musculoskel: yalgias, arthralgias,joint pain  Neurological:  negative for headaches, dizziness, vertigo, memory problems and gait   Behavl/Psych: negative for feelings of anxiety, depression, mood changes    Past Medical History:   Diagnosis Date    Arthritis     Diabetes [facility] [address]     Fracture     HTN (hypertension)     Hyperlipemia      Past Surgical History:   Procedure Laterality Date    HX CHOLECYSTECTOMY      HX FRACTURE TX Left     HX ORTHOPAEDIC      lumbar discectomy      Social History     Socioeconomic History    Marital status:    Tobacco Use    Smoking status: Never Smoker    Smokeless tobacco: Never Used   Vaping Use    Vaping Use: Never used   Substance and Sexual Activity    Alcohol use: No    Drug use: No     Family History   Problem Relation Age of Onset    Diabetes Father     Kidney Disease Father     Diabetes Brother     Cancer Brother     Stroke Mother     Kidney Disease Brother     Cancer Brother     Other Brother         kidney stones    Cancer Sister     Cancer Sister      Current Outpatient Medications   Medication Sig Dispense Refill    metFORMIN (GLUCOPHAGE) 1,000 mg tablet Take 1 Tablet by mouth two (2) times a day. 180 Tablet 3    pravastatin (PRAVACHOL) 40 mg tablet Take 1 Tablet by mouth daily. 90 Tablet 3    losartan (COZAAR) 50 mg tablet Take 1 Tablet by mouth daily. 90 Tablet 3    metoprolol tartrate (LOPRESSOR) 100 mg IR tablet Take 1 Tablet by mouth two (2) times a day. 180 Tablet 3    cyanocobalamin 1,000 mcg tablet Take 1,000 mcg by mouth daily.  magnesium 250 mg tab Take  by mouth.  cyanocobalamin (Vitamin B-12) 1,000 mcg tablet Take 1,000 mcg by mouth daily.  ReliOn Prime Meter misc USE TO CHECK BLOOD SUGAR ONCE DAILY      ReliOn Prime Test Strips strip USE TO CHECK BLOOD SUGAR ONCE DAILY      ferrous sulfate 325 mg (65 mg iron) tablet Take 325 mg by mouth.  glipiZIDE SR (GLUCOTROL XL) 5 mg CR tablet daily.  latanoprost (XALATAN) 0.005 % ophthalmic solution Administer 1 Drop to both eyes nightly.  aspirin 81 mg chewable tablet Take 81 mg by mouth every other day.  amLODIPine (NORVASC) 10 mg tablet Take  by mouth daily.  cloNIDine HCl (CATAPRES) 0.1 mg tablet Take  by mouth two (2) times a day.  calcium carbonate (OS-ANTONETTE) 500 mg calcium (1,250 mg) tablet Take  by mouth daily. (Patient not taking: Reported on 10/14/2021)      cyclobenzaprine (FLEXERIL) 5 mg tablet TAKE 1 TABLET BY MOUTH AT BEDTIME AS NEEDED FOR MUSCLE SPASM (Patient not taking: Reported on 10/14/2021)      calcium carbonate (TUMS) 200 mg calcium (500 mg) chew Take 1 Tab by mouth Before breakfast and dinner.  (Patient not taking: Reported on 12/30/2021) 200 Tab 5    Alcohol Prep Pads padm  (Patient not taking: Reported on 12/30/2021)      diclofenac (VOLTAREN) 1 % gel  (Patient not taking: Reported on 10/14/2021)      meloxicam (MOBIC) 15 mg tablet  (Patient not taking: Reported on 5/19/2021)      cholecalciferol (Vitamin D3) (1000 Units /25 mcg) tablet Take 1,000 Units by mouth daily. (Patient not taking: Reported on 10/14/2021)      OTHER daily. Calcium citrate + D ( Vit D3 500 units and Calcium 630  (Patient not taking: Reported on 9/16/2021)       Allergies   Allergen Reactions    Enalapril Swelling       Objective:  Visit Vitals  /78 (BP 1 Location: Right arm, BP Patient Position: Sitting, BP Cuff Size: Adult)   Pulse 73   Temp 97.6 °F (36.4 °C) (Oral) Comment (Src): o   Resp 18   Ht 5' 4\" (1.626 m)   Wt 184 lb (83.5 kg)   SpO2 98%   BMI 31.58 kg/m²     Physical Exam:   General appearance - alert, well appearing, and in no distress  Mental status - alert, oriented to person, place, and time  EYE-CASEY, EOMI, corneas normal, no foreign bodies  ENT-ENT exam normal, no neck nodes or sinus tenderness  Nose - normal and patent, no erythema, discharge or polyps  Mouth - mucous membranes moist, pharynx normal without lesions  Neck - supple, no significant adenopathy   Chest - clear to auscultation, no wheezes, rales or rhonchi, symmetric air entry   Heart - normal rate, regular rhythm, normal S1, S2, no murmurs, rubs, clicks or gallops   Abdomen - soft, nontender, nondistended, no masses or organomegaly  Lymph- no adenopathy palpable  Ext-peripheral pulses normal, no pedal edema, no clubbing or cyanosis  Skin-Warm and dry.  no hyperpigmentation, vitiligo, or suspicious lesions  Neuro -alert, oriented, normal speech, no focal findings or movement disorder noted  Neck-normal C-spine, no tenderness, full ROM without pain  Feet-no nail deformities or callus formation with good pulses noted  Rt.wrist tenderness to palpation    Results for orders placed or performed in visit on 09/16/21   AMB POC LIPID PROFILE   Result Value Ref Range    Cholesterol (POC) 109     Triglycerides (POC) 92     HDL Cholesterol (POC) 39     Non-HDL Cholesterol 70     LDL Cholesterol (POC) 51 MG/DL    TChol/HDL Ratio (POC) 2.8    AMB POC HEMOGLOBIN A1C   Result Value Ref Range Hemoglobin A1c (POC) 6.4 %   AMB POC GLUCOSE BLOOD, BY GLUCOSE MONITORING DEVICE   Result Value Ref Range    Glucose  MG/DL       Assessment/Plan:    ICD-10-CM ICD-9-CM    1. Essential hypertension  I10 401.9 CBC W/O DIFF      METABOLIC PANEL, COMPREHENSIVE   2. Encounter for screening mammogram for malignant neoplasm of breast  Z12.31 V76.12 [facility] MAMMO BI SCREENING INCL CAD   3. Primary hyperparathyroidism (HCC)  E21.0 252.01    4. Hypercalcemia  E83.52 275.42    5. Type II or unspecified type diabetes mellitus with neurological manifestations, uncontrolled(250.62) (HCC)  E11.49 250.62 AMB POC GLUCOSE BLOOD, BY GLUCOSE MONITORING DEVICE      AMB POC HEMOGLOBIN A1C   6. Hypercholesteremia  E78.00 272.0 AMB POC LIPID PROFILE   7. Carpal tunnel syndrome of right wrist  G56.01 354.0      Orders Placed This Encounter    [facility] MAMMO BI SCREENING INCL CAD     Standing Status:   Future     Standing Expiration Date:   1/30/2022     Order Specific Question:   Reason for Exam     Answer:   breast cancer screening    CBC W/O DIFF     Standing Status:   Future     Standing Expiration Date:   20/71/1390    METABOLIC PANEL, COMPREHENSIVE     Standing Status:   Future     Standing Expiration Date:   12/30/2022    AMB POC GLUCOSE BLOOD, BY GLUCOSE MONITORING DEVICE    AMB POC HEMOGLOBIN A1C    AMB POC LIPID PROFILE     lose weight, call if any problems  Patient Instructions   [email or web address] Activation    Thank you for requesting access to [facility]. Please follow the instructions below to securely access and download your online medical record. [facility] allows you to send messages to your doctor, view your test results, renew your prescriptions, schedule appointments, and more. How Do I Sign Up? 1. In your internet browser, go to [email or web address]  2. Click on the First Time User? Click Here link in the Sign In box. You will be redirect to the New Member Sign Up page.   3. Enter your [facility] Access Code exactly as it appears below. You will not need to use this code after youve completed the sign-up process. If you do not sign up before the expiration date, you must request a new code. Plum[facility] Access Code: [email or web address]  Expires: 2022  2:47 PM (This is the date your [facility] access code will )    4. Enter the last four digits of your Social Security Number (xxxx) and Date of Birth (mm/dd/yyyy) as indicated and click Submit. You will be taken to the next sign-up page. 5. Create a Electro Power Systemst ID. This will be your Plumzi login ID and cannot be changed, so think of one that is secure and easy to remember. 6. Create a [facility]zi password. You can change your password at any time. 7. Enter your Password Reset Question and Answer. This can be used at a later time if you forget your password. 8. Enter your e-mail address. You will receive e-mail notification when new information is available in [address]. 9. Click Sign Up. You can now view and download portions of your medical record. 10. Click the Download Summary menu link to download a portable copy of your medical information. Additional Information    If you have questions, please visit the Frequently Asked Questions section of the [employer] website at [email or web address] Remember, [facility] is NOT to be used for urgent needs. For medical emergencies, dial 911. Follow-up and Dispositions    · Return in about 3 months (around 3/30/2022), or if symptoms worsen or fail to improve. Follow-up and Disposition History         I have reviewed with the patient details of the assessment and plan and all questions were answered. Relevent patient education was performed    An After Visit Summary was printed and given to the patient.

## 2022-01-19 ENCOUNTER — HOSPITAL ENCOUNTER (OUTPATIENT)
Dept: MAMMOGRAPHY | Age: 72
Discharge: HOME | End: 2022-01-19
Attending: INTERNAL MEDICINE
Payer: MEDICARE

## 2022-01-19 DIAGNOSIS — Z12.31 ENCOUNTER FOR SCREENING MAMMOGRAM FOR MALIGNANT NEOPLASM OF BREAST: ICD-10-CM

## 2022-01-19 PROCEDURE — 77067 SCR MAMMO BI INCL CAD: CPT

## 2022-02-04 RX ORDER — CLONIDINE HYDROCHLORIDE 0.1 MG/1
0.1 TABLET ORAL 2 TIMES DAILY
Qty: 180 TABLET | Refills: 3 | Status: SHIPPED | OUTPATIENT
Start: 2022-02-04 | End: 2022-03-30 | Stop reason: SDUPTHER

## 2022-02-04 RX ORDER — GLIPIZIDE 5 MG/1
5 TABLET, FILM COATED, EXTENDED RELEASE ORAL DAILY
Qty: 90 TABLET | Refills: 3 | Status: SHIPPED | OUTPATIENT
Start: 2022-02-04

## 2022-03-19 PROBLEM — E11.9 TYPE 2 DIABETES MELLITUS (HCC): Status: ACTIVE | Noted: 2021-12-30

## 2022-03-19 PROBLEM — E21.0 PRIMARY HYPERPARATHYROIDISM (HCC): Status: ACTIVE | Noted: 2020-12-07

## 2022-03-30 ENCOUNTER — OFFICE VISIT (OUTPATIENT)
Dept: INTERNAL MEDICINE CLINIC | Age: 72
End: 2022-03-30
Payer: MEDICARE

## 2022-03-30 VITALS
OXYGEN SATURATION: 98 % | HEART RATE: 64 BPM | SYSTOLIC BLOOD PRESSURE: 116 MMHG | HEIGHT: 64 IN | BODY MASS INDEX: 31.76 KG/M2 | RESPIRATION RATE: 16 BRPM | TEMPERATURE: 97.8 F | WEIGHT: 186 LBS | DIASTOLIC BLOOD PRESSURE: 84 MMHG

## 2022-03-30 DIAGNOSIS — I10 ESSENTIAL HYPERTENSION: Primary | ICD-10-CM

## 2022-03-30 DIAGNOSIS — E21.0 PRIMARY HYPERPARATHYROIDISM (HCC): ICD-10-CM

## 2022-03-30 DIAGNOSIS — E78.00 HYPERCHOLESTEREMIA: ICD-10-CM

## 2022-03-30 DIAGNOSIS — E11.49 TYPE II OR UNSPECIFIED TYPE DIABETES MELLITUS WITH NEUROLOGICAL MANIFESTATIONS, UNCONTROLLED(250.62) (HCC): ICD-10-CM

## 2022-03-30 LAB
ALBUMIN SERPL-MCNC: 3.9 G/DL (ref 3.5–5)
ALBUMIN/GLOB SERPL: 1.2 {RATIO} (ref 1.1–2.2)
ALP SERPL-CCNC: 93 U/L (ref 45–117)
ALT SERPL-CCNC: 14 U/L (ref 12–78)
ANION GAP SERPL CALC-SCNC: 8 MMOL/L (ref 5–15)
AST SERPL-CCNC: 12 U/L (ref 15–37)
BILIRUB SERPL-MCNC: 0.3 MG/DL (ref 0.2–1)
BUN SERPL-MCNC: 20 MG/DL (ref 6–20)
BUN/CREAT SERPL: 20 (ref 12–20)
CALCIUM SERPL-MCNC: 11.1 MG/DL (ref 8.5–10.1)
CHLORIDE SERPL-SCNC: 108 MMOL/L (ref 97–108)
CHOLEST SERPL-MCNC: 138 MG/DL
CO2 SERPL-SCNC: 26 MMOL/L (ref 21–32)
CREAT SERPL-MCNC: 0.98 MG/DL (ref 0.55–1.02)
ERYTHROCYTE [DISTWIDTH] IN BLOOD BY AUTOMATED COUNT: 13.8 % (ref 11.5–14.5)
GLOBULIN SER CALC-MCNC: 3.2 G/DL (ref 2–4)
GLUCOSE POC: 131 MG/DL
GLUCOSE SERPL-MCNC: 131 MG/DL (ref 65–100)
HBA1C MFR BLD HPLC: 6.8 %
HCT VFR BLD AUTO: 40.3 % (ref 35–47)
HDLC SERPL-MCNC: 46 MG/DL
HGB BLD-MCNC: 12.5 G/DL (ref 11.5–16)
LDL CHOLESTEROL POC: 76 MG/DL
MCH RBC QN AUTO: 27.7 PG (ref 26–34)
MCHC RBC AUTO-ENTMCNC: 31 G/DL (ref 30–36.5)
MCV RBC AUTO: 89.4 FL (ref 80–99)
NON-HDL CHOLESTEROL, 011976: 93
NRBC # BLD: 0 K/UL (ref 0–0.01)
NRBC BLD-RTO: 0 PER 100 WBC
PLATELET # BLD AUTO: 252 K/UL (ref 150–400)
PMV BLD AUTO: 12.3 FL (ref 8.9–12.9)
POTASSIUM SERPL-SCNC: 4.2 MMOL/L (ref 3.5–5.1)
PROT SERPL-MCNC: 7.1 G/DL (ref 6.4–8.2)
RBC # BLD AUTO: 4.51 M/UL (ref 3.8–5.2)
SODIUM SERPL-SCNC: 142 MMOL/L (ref 136–145)
TCHOL/HDL RATIO (POC): 3
TRIGL SERPL-MCNC: 82 MG/DL
WBC # BLD AUTO: 10.1 K/UL (ref 3.6–11)

## 2022-03-30 PROCEDURE — 2022F DILAT RTA XM EVC RTNOPTHY: CPT | Performed by: INTERNAL MEDICINE

## 2022-03-30 PROCEDURE — G8536 NO DOC ELDER MAL SCRN: HCPCS | Performed by: INTERNAL MEDICINE

## 2022-03-30 PROCEDURE — G8427 DOCREV CUR MEDS BY ELIG CLIN: HCPCS | Performed by: INTERNAL MEDICINE

## 2022-03-30 PROCEDURE — 99214 OFFICE O/P EST MOD 30 MIN: CPT | Performed by: INTERNAL MEDICINE

## 2022-03-30 PROCEDURE — G9899 SCRN MAM PERF RSLTS DOC: HCPCS | Performed by: INTERNAL MEDICINE

## 2022-03-30 PROCEDURE — 3017F COLORECTAL CA SCREEN DOC REV: CPT | Performed by: INTERNAL MEDICINE

## 2022-03-30 PROCEDURE — G8510 SCR DEP NEG, NO PLAN REQD: HCPCS | Performed by: INTERNAL MEDICINE

## 2022-03-30 PROCEDURE — G8417 CALC BMI ABV UP PARAM F/U: HCPCS | Performed by: INTERNAL MEDICINE

## 2022-03-30 PROCEDURE — 1090F PRES/ABSN URINE INCON ASSESS: CPT | Performed by: INTERNAL MEDICINE

## 2022-03-30 PROCEDURE — 3044F HG A1C LEVEL LT 7.0%: CPT | Performed by: INTERNAL MEDICINE

## 2022-03-30 PROCEDURE — G8752 SYS BP LESS 140: HCPCS | Performed by: INTERNAL MEDICINE

## 2022-03-30 PROCEDURE — 83036 HEMOGLOBIN GLYCOSYLATED A1C: CPT | Performed by: INTERNAL MEDICINE

## 2022-03-30 PROCEDURE — 80061 LIPID PANEL: CPT | Performed by: INTERNAL MEDICINE

## 2022-03-30 PROCEDURE — 1101F PT FALLS ASSESS-DOCD LE1/YR: CPT | Performed by: INTERNAL MEDICINE

## 2022-03-30 PROCEDURE — G8754 DIAS BP LESS 90: HCPCS | Performed by: INTERNAL MEDICINE

## 2022-03-30 PROCEDURE — G8399 PT W/DXA RESULTS DOCUMENT: HCPCS | Performed by: INTERNAL MEDICINE

## 2022-03-30 PROCEDURE — 82962 GLUCOSE BLOOD TEST: CPT | Performed by: INTERNAL MEDICINE

## 2022-03-30 RX ORDER — LANOLIN ALCOHOL/MO/W.PET/CERES
325 CREAM (GRAM) TOPICAL DAILY
Qty: 30 TABLET | Refills: 3 | Status: SHIPPED | OUTPATIENT
Start: 2022-03-30 | End: 2022-10-26

## 2022-03-30 RX ORDER — CLONIDINE HYDROCHLORIDE 0.1 MG/1
0.1 TABLET ORAL 2 TIMES DAILY
Qty: 180 TABLET | Refills: 3 | Status: SHIPPED | OUTPATIENT
Start: 2022-03-30

## 2022-03-30 RX ORDER — AMLODIPINE BESYLATE 10 MG/1
10 TABLET ORAL DAILY
Qty: 30 TABLET | Refills: 12 | Status: SHIPPED | OUTPATIENT
Start: 2022-03-30 | End: 2022-06-28

## 2022-03-30 NOTE — PROGRESS NOTES
1. Have you been to the ER, urgent care clinic since your last visit? Hospitalized since your last visit?no    2. Have you seen or consulted any other health care providers outside of the [address] [place] [address] since your last visit? Include any pap smears or colon screening. No    Chief Complaint   Patient presents with    Diabetes    Hypertension    Cholesterol Problem     3 most recent PHQ Screens 3/30/2022   PHQ Not Done -   Little interest or pleasure in doing things Not at all   Feeling down, depressed, irritable, or hopeless Not at all   Total Score PHQ 2 0     Per Dr. Sam León.,  verbal order given for needed amb poc labs.

## 2022-03-30 NOTE — PROGRESS NOTES
Tea Leon is a 70 y.o. female and presents with Diabetes, Hypertension, and Cholesterol Problem  . Subjective:    She states she is to have a parathyroidectomy soon    Hypertension Review:  The patient has hypertension . Diet and Lifestyle: generally follows a low sodium diet, exercises sporadically  Home BP Monitoring: is not measured at home. Pertinent ROS: taking medications as instructed, no medication side effects noted, no TIA's, no chest pain on exertion, no dyspnea on exertion, no swelling of ankles. Dyslipidemia Review:  Patient presents for evaluation of lipids. Compliance with treatment thus far has been excellent. A repeat fasting lipid profile was not done. The patient does not use medications that may worsen dyslipidemias . The patient exercises sporadically. Diabetes Mellitus Review:  She has diabetes mellitus. Diabetic ROS - medication compliance: compliant all of the time, diabetic diet compliance: compliant all of the time, home glucose monitoring: is performed. Known diabetic complications: none  Cardiovascular risk factors: family history, dyslipidemia, diabetes mellitus, obesity, hypertension  Current diabetic medications include oral agents/insulin   Eye exam current (within one year): no  Weight trend: stable  Prior visit with dietician: no  Current diet: \"healthy\" diet  in general  Current exercise: walking  Current monitoring regimen: home blood tests - daily  Home blood sugar records: trend: stable  Any episodes of hypoglycemia? no  Lab review: orders written for new lab studies as appropriate; see orders. Review of Systems  Constitutional: negative for fevers, chills, anorexia and weight loss  Eyes:   negative for visual disturbance and irritation  ENT:   negative for tinnitus,sore throat,nasal congestion,ear pains. hoarseness  Respiratory:  negative for cough, hemoptysis, dyspnea,wheezing  CV:   negative for chest pain, palpitations, lower extremity edema  GI:   negative for nausea, vomiting, diarrhea, abdominal pain,melena  Endo:               negative for polyuria,polydipsia,polyphagia,heat intolerance  Genitourinary: negative for frequency, dysuria and hematuria  Integument:  negative for rash and pruritus  Hematologic:  negative for easy bruising and gum/nose bleeding  Musculoskel: negative for myalgias, arthralgias, back pain, muscle weakness, joint pain  Neurological:  negative for headaches, dizziness, vertigo, memory problems and gait   Behavl/Psych: negative for feelings of anxiety, depression, mood changes    Past Medical History:   Diagnosis Date    Arthritis     Diabetes ([address]     Fracture     HTN (hypertension)     Hyperlipemia     Menopause      Past Surgical History:   Procedure Laterality Date    HX CHOLECYSTECTOMY      HX FRACTURE TX Left     HX ORTHOPAEDIC      lumbar discectomy      Social History     Socioeconomic History    Marital status:    Tobacco Use    Smoking status: Never Smoker    Smokeless tobacco: Never Used   Vaping Use    Vaping Use: Never used   Substance and Sexual Activity    Alcohol use: No    Drug use: No     Family History   Problem Relation Age of Onset    Diabetes Father     Kidney Disease Father     Diabetes Brother     Cancer Brother     Stroke Mother     Kidney Disease Brother     Cancer Brother     Other Brother         kidney stones    Cancer Sister     Ovarian Cancer Sister     Cancer Sister         Neck/Head? Current Outpatient Medications   Medication Sig Dispense Refill    cloNIDine HCL (CATAPRES) 0.1 mg tablet Take 1 Tablet by mouth two (2) times a day. Take  by mouth two (2) times a day. 180 Tablet 3    amLODIPine (NORVASC) 10 mg tablet Take 1 Tablet by mouth daily. 30 Tablet 12    ferrous sulfate 325 mg (65 mg iron) tablet Take 1 Tablet by mouth daily. 30 Tablet 3    glipiZIDE SR (GLUCOTROL XL) 5 mg CR tablet Take 1 Tablet by mouth daily.  90 Tablet 3    metFORMIN (GLUCOPHAGE) 1,000 mg tablet Take 1 Tablet by mouth two (2) times a day. 180 Tablet 3    pravastatin (PRAVACHOL) 40 mg tablet Take 1 Tablet by mouth daily. 90 Tablet 3    losartan (COZAAR) 50 mg tablet Take 1 Tablet by mouth daily. 90 Tablet 3    metoprolol tartrate (LOPRESSOR) 100 mg IR tablet Take 1 Tablet by mouth two (2) times a day. 180 Tablet 3    cyanocobalamin 1,000 mcg tablet Take 1,000 mcg by mouth daily.  magnesium 250 mg tab Take  by mouth.  cyanocobalamin (Vitamin B-12) 1,000 mcg tablet Take 1,000 mcg by mouth daily.  ReliOn Prime Meter misc USE TO CHECK BLOOD SUGAR ONCE DAILY      ReliOn Prime Test Strips strip USE TO CHECK BLOOD SUGAR ONCE DAILY      latanoprost (XALATAN) 0.005 % ophthalmic solution Administer 1 Drop to both eyes nightly.  aspirin 81 mg chewable tablet Take 81 mg by mouth every other day.  calcium carbonate (OS-ANTONETTE) 500 mg calcium (1,250 mg) tablet Take  by mouth daily. (Patient not taking: Reported on 10/14/2021)      cyclobenzaprine (FLEXERIL) 5 mg tablet TAKE 1 TABLET BY MOUTH AT BEDTIME AS NEEDED FOR MUSCLE SPASM (Patient not taking: Reported on 10/14/2021)      calcium carbonate (TUMS) 200 mg calcium (500 mg) chew Take 1 Tab by mouth Before breakfast and dinner. (Patient not taking: Reported on 12/30/2021) 200 Tab 5    Alcohol Prep Pads padm  (Patient not taking: Reported on 12/30/2021)      diclofenac (VOLTAREN) 1 % gel  (Patient not taking: Reported on 10/14/2021)      meloxicam (MOBIC) 15 mg tablet  (Patient not taking: Reported on 5/19/2021)      cholecalciferol (Vitamin D3) (1000 Units /25 mcg) tablet Take 1,000 Units by mouth daily. (Patient not taking: Reported on 10/14/2021)      OTHER daily.  Calcium citrate + D ( Vit D3 500 units and Calcium 630  (Patient not taking: Reported on 9/16/2021)       Allergies   Allergen Reactions    Enalapril Swelling       Objective:  Visit Vitals  /84   Pulse 64   Temp 97.8 °F (36.6 °C) (Oral) Resp 16   Ht 5' 4\" (1.626 m)   Wt 186 lb (84.4 kg)   SpO2 98%   BMI 31.93 kg/m²     Physical Exam:   General appearance - alert, well appearing, and in no distress  Mental status - alert, oriented to person, place, and time  EYE-CASEY, EOMI, corneas normal, no foreign bodies  ENT-ENT exam normal, no neck nodes or sinus tenderness  Nose - normal and patent, no erythema, discharge or polyps  Mouth - mucous membranes moist, pharynx normal without lesions  Neck - supple, no significant adenopathy   Chest - clear to auscultation, no wheezes, rales or rhonchi, symmetric air entry   Heart - normal rate, regular rhythm, normal S1, S2, no murmurs, rubs, clicks or gallops   Abdomen - soft, nontender, nondistended, no masses or organomegaly  Lymph- no adenopathy palpable  Ext-peripheral pulses normal, no pedal edema, no clubbing or cyanosis  Skin-Warm and dry. no hyperpigmentation, vitiligo, or suspicious lesions  Neuro -alert, oriented, normal speech, no focal findings or movement disorder noted  Neck-normal C-spine, no tenderness, full ROM without pain  Feet-no nail deformities or callus formation with good pulses noted      Results for orders placed or performed in visit on 03/30/22   AMB POC HEMOGLOBIN A1C   Result Value Ref Range    Hemoglobin A1c (POC) 6.8 %   AMB POC GLUCOSE BLOOD, BY GLUCOSE MONITORING DEVICE   Result Value Ref Range    Glucose  MG/DL       Assessment/Plan:    ICD-10-CM ICD-9-CM    1. Essential hypertension  I10 401.9 AMB POC LIPID PROFILE      AMB POC HEMOGLOBIN A1C      AMB POC GLUCOSE BLOOD, BY GLUCOSE MONITORING DEVICE      CBC W/O DIFF      METABOLIC PANEL, COMPREHENSIVE   2. Type II or unspecified type diabetes mellitus with neurological manifestations, uncontrolled(250.62) ([facility])  E11.49 250.62 AMB POC LIPID PROFILE      AMB POC HEMOGLOBIN A1C      AMB POC GLUCOSE BLOOD, BY GLUCOSE MONITORING DEVICE      CBC W/O DIFF      METABOLIC PANEL, COMPREHENSIVE   3.  Hypercholesteremia  E78.00 272.0 AMB POC LIPID PROFILE      AMB POC HEMOGLOBIN A1C      AMB POC GLUCOSE BLOOD, BY GLUCOSE MONITORING DEVICE   4. Primary hyperparathyroidism ([address]  E21.0 252.01 REFERRAL TO ENT-OTOLARYNGOLOGY     Orders Placed This Encounter    CBC W/O DIFF     Standing Status:   Future     Standing Expiration Date:   4/43/4581    METABOLIC PANEL, COMPREHENSIVE     Standing Status:   Future     Standing Expiration Date:   3/30/2023   Karen Gasca ENT [facility]     Referral Priority:   Routine     Referral Type:   Consultation     Referral Reason:   Specialty Services Required     Referred to Provider:   Mateo Eagle MD     Number of Visits Requested:   1    AMB POC LIPID PROFILE    AMB POC HEMOGLOBIN A1C    AMB POC GLUCOSE BLOOD, BY GLUCOSE MONITORING DEVICE    cloNIDine HCL (CATAPRES) 0.1 mg tablet     Sig: Take 1 Tablet by mouth two (2) times a day. Take  by mouth two (2) times a day. Dispense:  180 Tablet     Refill:  3    amLODIPine (NORVASC) 10 mg tablet     Sig: Take 1 Tablet by mouth daily. Dispense:  30 Tablet     Refill:  12    ferrous sulfate 325 mg (65 mg iron) tablet     Sig: Take 1 Tablet by mouth daily. Dispense:  30 Tablet     Refill:  3     lose weight, increase physical activity, follow low fat diet, follow low salt diet, call if any problems,Take 81mg aspirin daily  Patient Instructions   AdVolume Activation    Thank you for requesting access to [employer]. Please follow the instructions below to securely access and download your online medical record. [employer] allows you to send messages to your doctor, view your test results, renew your prescriptions, schedule appointments, and more. How Do I Sign Up? 1. In your internet browser, go to [email or web address]  2. Click on the First Time User? Click Here link in the Sign In box. You will be redirect to the New Member Sign Up page. 3. Enter your AdVolume Access Code exactly as it appears below.  You will not need to use this code after youve completed the sign-up process. If you do not sign up before the expiration date, you must request a new code. CasterStats Access Code: [email or web address]  Expires: 2022 12:27 PM (This is the date your Caster[employer] access code will )    4. Enter the last four digits of your Social Security Number (xxxx) and Date of Birth (mm/dd/yyyy) as indicated and click Submit. You will be taken to the next sign-up page. 5. Create a SumRidge Partnerst ID. This will be your CasterStats login ID and cannot be changed, so think of one that is secure and easy to remember. 6. Create a [employer]Stats password. You can change your password at any time. 7. Enter your Password Reset Question and Answer. This can be used at a later time if you forget your password. 8. Enter your e-mail address. You will receive e-mail notification when new information is available in [address] 9. Click Sign Up. You can now view and download portions of your medical record. 10. Click the Download Summary menu link to download a portable copy of your medical information. Additional Information    If you have questions, please visit the Frequently Asked Questions section of the [employer] website at [email or web address] Remember, CasterStats is NOT to be used for urgent needs. For medical emergencies, dial 911. Follow-up and Dispositions    · Return in about 3 months (around 2022), or if symptoms worsen or fail to improve. I have reviewed with the patient details of the assessment and plan and all questions were answered. Relevent patient education was performed. The most recent lab findings were reviewed with the patient. An After Visit Summary was printed and given to the patient.

## 2022-03-30 NOTE — PATIENT INSTRUCTIONS
Bandhappy Activation    Thank you for requesting access to Bandhappy. Please follow the instructions below to securely access and download your online medical record. Bandhappy allows you to send messages to your doctor, view your test results, renew your prescriptions, schedule appointments, and more. How Do I Sign Up? 1. In your internet browser, go to [email or web address]  2. Click on the First Time User? Click Here link in the Sign In box. You will be redirect to the New Member Sign Up page. 3. Enter your Bandhappy Access Code exactly as it appears below. You will not need to use this code after youve completed the sign-up process. If you do not sign up before the expiration date, you must request a new code. Bandhappy Access Code: [email or web address]  Expires: 2022 12:27 PM (This is the date your Bandhappy access code will )    4. Enter the last four digits of your Social Security Number (xxxx) and Date of Birth (mm/dd/yyyy) as indicated and click Submit. You will be taken to the next sign-up page. 5. Create a Bandhappy ID. This will be your Bandhappy login ID and cannot be changed, so think of one that is secure and easy to remember. 6. Create a Bandhappy password. You can change your password at any time. 7. Enter your Password Reset Question and Answer. This can be used at a later time if you forget your password. 8. Enter your e-mail address. You will receive e-mail notification when new information is available in [address] 9. Click Sign Up. You can now view and download portions of your medical record. 10. Click the Download Summary menu link to download a portable copy of your medical information. Additional Information    If you have questions, please visit the Frequently Asked Questions section of the Bandhappy website at [email or web address] Remember, Bandhappy is NOT to be used for urgent needs. For medical emergencies, dial 911.

## 2022-04-08 NOTE — TELEPHONE ENCOUNTER
Pt is requesting a new prescription to be sent to [address] [place] [address]. Previous prescription was sent to [facility]. Last visit 03/30/2022 MD Johan Aragon   Next appointment 07/06/2022 MD Johan Aragon   Previous refill encounter(s)   10/27/2021 Lopressor #180 with 3 refills         ----- Message from Bunker Mode sent at 4/8/2022  1:02 PM EDT -----  Subject: Refill Request    QUESTIONS  Name of Medication? metoprolol tartrate (LOPRESSOR) 100 mg IR tablet  Patient-reported dosage and instructions? Take 1 Tablet by mouth two (2)   times a day. How many days do you have left? 6  Preferred Pharmacy? [facility]  Pharmacy phone number (if available)? [phone]  ---------------------------------------------------------------------------  --------------  CALL BACK INFO  What is the best way for the office to contact you? Do not leave any   message, patient will call back for answer  Preferred Call Back Phone Number? [phone]  ---------------------------------------------------------------------------  --------------  SCRIPT ANSWERS  Relationship to Patient?  Self

## 2022-04-10 RX ORDER — METOPROLOL TARTRATE 100 MG/1
100 TABLET ORAL 2 TIMES DAILY
Qty: 60 TABLET | Refills: 2 | Status: SHIPPED | OUTPATIENT
Start: 2022-04-10 | End: 2022-07-08

## 2022-05-02 RX ORDER — METFORMIN HYDROCHLORIDE 1000 MG/1
1000 TABLET ORAL 2 TIMES DAILY
Qty: 180 TABLET | Refills: 3 | Status: SHIPPED | OUTPATIENT
Start: 2022-05-02

## 2022-05-04 ENCOUNTER — HOSPITAL ENCOUNTER (OUTPATIENT)
Dept: PREADMISSION TESTING | Age: 72
Discharge: HOME | End: 2022-05-04
Payer: MEDICARE

## 2022-05-04 VITALS
SYSTOLIC BLOOD PRESSURE: 162 MMHG | HEIGHT: 64 IN | WEIGHT: 188.27 LBS | DIASTOLIC BLOOD PRESSURE: 82 MMHG | HEART RATE: 69 BPM | RESPIRATION RATE: 12 BRPM | TEMPERATURE: 97.7 F | BODY MASS INDEX: 32.14 KG/M2

## 2022-05-04 LAB
ATRIAL RATE: 63 BPM
CALCULATED P AXIS, ECG09: 18 DEGREES
CALCULATED R AXIS, ECG10: -51 DEGREES
CALCULATED T AXIS, ECG11: -29 DEGREES
DIAGNOSIS, 93000: NORMAL
EST. AVERAGE GLUCOSE BLD GHB EST-MCNC: 154 MG/DL
HBA1C MFR BLD: 7 % (ref 4–5.6)
P-R INTERVAL, ECG05: 166 MS
Q-T INTERVAL, ECG07: 428 MS
QRS DURATION, ECG06: 118 MS
QTC CALCULATION (BEZET), ECG08: 437 MS
VENTRICULAR RATE, ECG03: 63 BPM

## 2022-05-04 PROCEDURE — 93005 ELECTROCARDIOGRAM TRACING: CPT

## 2022-05-04 PROCEDURE — 83036 HEMOGLOBIN GLYCOSYLATED A1C: CPT

## 2022-05-04 RX ORDER — CYANOCOBALAMIN 1000 UG/ML
1000 INJECTION, SOLUTION INTRAMUSCULAR; SUBCUTANEOUS EVERY 2 WEEKS
COMMUNITY
End: 2022-07-19 | Stop reason: SDUPTHER

## 2022-05-04 NOTE — PERIOP NOTES
Preoperative instructions reviewed with patient and her son, Edgardo Colmenares. Patient given SSI infection FAQS sheet. Given two bottles of skin prep chlorhexidine soap; given written and verbal instructions on use. Patient and Edgardo Colmenares were given the opportunity to ask questions on the information provided. Surgical consent obtained and signed by patient. Copy of COVID vaccination card placed on chart. Patient was not interested in staying at the [facility].

## 2022-05-04 NOTE — PERIOP NOTES
[address] INSTRUCTIONS    Surgery Date:   5/12/22    [facility] staff will call you between 4 PM- 8 PM the day before surgery with your arrival time. If your surgery is on a Monday, we will call you the preceding Friday. Please call [phone] after 8 PM if you did not receive your arrival time. 1. Please report to [facility] Patient Access/Admitting on the 1st floor. Bring your insurance card, photo identification, and any copayment ( if applicable). 2. If you are going home the same day of your surgery, you must have a responsible adult to drive you home. You need to have a responsible adult to stay with you the first 24 hours after surgery and you should not drive a car for 24 hours following your surgery. 3. Nothing to eat or drink after midnight the night before surgery. This includes no water, gum, mints, coffee, juice, etc.  Please note special instructions, if applicable, below for medications. 4. Do NOT drink alcohol or smoke 24 hours before surgery. STOP smoking for 14 days prior as it helps with breathing and healing after surgery. 5. If you are being admitted to the hospital, please leave personal belongings/luggage in your car until you have an assigned hospital room number. 6. Please wear comfortable clothes. Wear your glasses instead of contacts. We ask that all money, jewelry and valuables be left at home. Wear no make up, particularly mascara, the day of surgery. 7.  All body piercings, rings, and jewelry need to be removed and left at home. Please remove any nail polish or artificial nails from your fingernails. Please wear your hair loose or down. Please no pony-tails, buns, or any metal hair accessories. If you shower the morning of surgery, please do not apply any lotions or powders afterwards. You may wear deodorant, unless having breast surgery. Do not shave any body area within 24 hours of your surgery.   8. Please follow all instructions to avoid any potential surgical cancellation. 9. Should your physical condition change, (i.e. fever, cold, flu, etc.) please notify your surgeon as soon as possible. 10. It is important to be on time. If a situation occurs where you may be delayed, please call:  [phone] / [phone] on the day of surgery. 11. The Preadmission Testing staff can be reached at [phone]. 12. Special instructions: none      Current Outpatient Medications   Medication Sig    cyanocobalamin (VITAMIN B12) 1,000 mcg/mL injection 1,000 mcg by IntraMUSCular route Once every 2 weeks.  dorzolamide HCl/timolol maleat (DORZOLAMIDE-TIMOLOL OP) Administer 1 Drop to both eyes two (2) times a day.  metFORMIN (GLUCOPHAGE) 1,000 mg tablet Take 1 Tablet by mouth two (2) times a day.  metoprolol tartrate (LOPRESSOR) 100 mg IR tablet Take 1 Tablet by mouth two (2) times a day.  cloNIDine HCL (CATAPRES) 0.1 mg tablet Take 1 Tablet by mouth two (2) times a day. Take  by mouth two (2) times a day.  amLODIPine (NORVASC) 10 mg tablet Take 1 Tablet by mouth daily.  ferrous sulfate 325 mg (65 mg iron) tablet Take 1 Tablet by mouth daily.  glipiZIDE SR (GLUCOTROL XL) 5 mg CR tablet Take 1 Tablet by mouth daily.  pravastatin (PRAVACHOL) 40 mg tablet Take 1 Tablet by mouth daily.  losartan (COZAAR) 50 mg tablet Take 1 Tablet by mouth daily.  ReliOn Prime Meter misc USE TO CHECK BLOOD SUGAR ONCE DAILY    ReliOn Prime Test Strips strip USE TO CHECK BLOOD SUGAR ONCE DAILY    diclofenac (VOLTAREN) 1 % gel Apply 2 g to affected area two (2) times a day.  latanoprost (XALATAN) 0.005 % ophthalmic solution Administer 1 Drop to both eyes nightly.  aspirin 81 mg chewable tablet Take 81 mg by mouth every other day. No current facility-administered medications for this encounter.        1. YOU MUST ONLY TAKE THESE MEDICATIONS THE MORNING OF SURGERY WITH A SIP OF WATER: PRAVASTATIN, CLONIDINE, METOPROLOL, AMLODIPINE, DORZOLAMIDE EYE DROPS  2. MEDICATIONS TO TAKE THE MORNING OF SURGERY ONLY IF NEEDED: N/A  3. HOLD these prescription medications BEFORE Surgery: STOP METFORMIN FOR 24 HOURS PRIOR TO SURGERY (STOP ON 5/11/22), STOP ASPIRIN FOR 7 DAYS BEFORE SURGERY (STOP ON 5/5/22), STOP DICLOFENAC GEL FOR 3 DAYS PRIOR TO SURGERY (STOP ON 5/9/22)  4. Ask your surgeon/prescribing physician about when/if to STOP taking these medications: N/A  5. Stop all vitamins, herbal medicines and Aspirin containing products 7 days prior to surgery. Stop any non-steroidal anti-inflammatory drugs (i.e. Ibuprofen, Naproxen, Advil, Aleve) 3 days before surgery. You may take Tylenol. 6. If you are currently taking Plavix, Coumadin, or any other blood-thinning/anticoagulant medication contact your prescribing physician for instructions. Preventing Infections Before and After - Your Surgery    IMPORTANT INSTRUCTIONS      You play an important role in your health and preparation for surgery. To reduce the germs on your skin you will need to shower with CHG soap (Chorhexidine gluconate 4%) two times before surgery. CHG soap (Hibiclens, Hex-A-Clens or store brand)   CHG soap will be provided at your Preadmission Testing (PAT) appointment.  If you do not have a PAT appointment before surgery, you may arrange to  CHG soap from our office or purchase CHG soap at a pharmacy, grocery or department store.  You need to purchase TWO 4 ounce bottles to use for your 2 showers. Steps to follow:  1. Wash your hair with your normal shampoo and your body with regular soap and rinse well to remove shampoo and soap from your skin. 2. Wet a clean washcloth and turn off the shower. 3. Put CHG soap on washcloth and apply to your entire body from the neck down. Do not use on your head, face or private parts(genitals). Do not use CHG soap on open sores, wounds or areas of skin irritation. 4. Wash you body gently for 5 minutes. Do not wash your skin too hard.  This soap does not create lather. Pay special attention to your underarms and from your belly button to your feet. 5. Turn the shower back on and rinse well to get CHG soap off your body. 6. Pat your skin dry with a clean, dry towel. Do not apply lotions or moisturizer. 7. Put on clean clothes and sleep on fresh bed sheets and do not allow pets to sleep with you. Shower with CHG soap 2 times before your surgery   The evening before your surgery   The morning of your surgery      Tips to help prevent infections after your surgery:  1. Protect your surgical wound from germs:  ? Hand washing is the most important thing you and your caregivers can do to prevent infections. ? Keep your bandage clean and dry! ? Do not touch your surgical wound. 2. Use clean, freshly washed towels and washcloths every time you shower; do not share bath linens with others. 3. Until your surgical wound is healed, wear clothing and sleep on bed linens each day that are clean and freshly washed. 4. Do not allow pets to sleep in your bed with you or touch your surgical wound. 5. Do not smoke - smoking delays wound healing. This may be a good time to stop smoking. 6. If you have diabetes, it is important for you to manage your blood sugar levels properly before your surgery as well as after your surgery. Poorly managed blood sugar levels slow down wound healing and prevent you from healing completely. Patient Information Regarding COVID Restrictions    Day of Procedure     Please park in the parking deck or any designated visitor parking lot.  Enter the facility through the [facility].   On the day of surgery, please provide the cell phone number of the person who will be waiting for you to the Patient Access representative at the time of registration.  Please wear a mask on the day of your procedure.  We are now allowing two designated visitors per stay.  Pediatric patients may have 2 designated visitors. These two people may come in with you on the day of your procedure.  No visitors under the age of 13.  The designated visitor must also wear a mask.  Once your procedure and the immediate recovery period is completed, a nurse in the recovery area will contact your designated visitor to inform them of your room number or to otherwise review other pertinent information regarding your care.  Social distancing practices are to be adhered to in waiting areas and the cafeteria. The patient was contacted  in person. She verbalized understanding of all instructions does not  need reinforcement.

## 2022-06-09 NOTE — PROGRESS NOTES
\" This addendum has been created to correct a medical record clerical error, wherein an erroneous  was selected for administered flu vaccine\"    INFLUENZA VACCINATION ADMINISTERED PER DR. GRAVES, VERBAL ORDER.

## 2022-06-28 ENCOUNTER — OFFICE VISIT (OUTPATIENT)
Dept: INTERNAL MEDICINE CLINIC | Age: 72
End: 2022-06-28
Payer: MEDICARE

## 2022-06-28 VITALS
HEIGHT: 64 IN | SYSTOLIC BLOOD PRESSURE: 130 MMHG | TEMPERATURE: 98 F | WEIGHT: 175 LBS | BODY MASS INDEX: 29.88 KG/M2 | DIASTOLIC BLOOD PRESSURE: 84 MMHG | HEART RATE: 77 BPM | RESPIRATION RATE: 16 BRPM | OXYGEN SATURATION: 99 %

## 2022-06-28 DIAGNOSIS — E11.49 TYPE II OR UNSPECIFIED TYPE DIABETES MELLITUS WITH NEUROLOGICAL MANIFESTATIONS, UNCONTROLLED(250.62) (HCC): ICD-10-CM

## 2022-06-28 DIAGNOSIS — E83.42 HYPOMAGNESEMIA: ICD-10-CM

## 2022-06-28 DIAGNOSIS — E53.8 B12 DEFICIENCY: ICD-10-CM

## 2022-06-28 DIAGNOSIS — E87.6 HYPOKALEMIA: ICD-10-CM

## 2022-06-28 DIAGNOSIS — M19.031 PRIMARY OSTEOARTHRITIS OF RIGHT WRIST: ICD-10-CM

## 2022-06-28 DIAGNOSIS — E21.0 PRIMARY HYPERPARATHYROIDISM (HCC): ICD-10-CM

## 2022-06-28 DIAGNOSIS — D35.1 PARATHYROID ADENOMA: ICD-10-CM

## 2022-06-28 DIAGNOSIS — Z09 HOSPITAL DISCHARGE FOLLOW-UP: ICD-10-CM

## 2022-06-28 DIAGNOSIS — R60.0 LOCALIZED EDEMA: ICD-10-CM

## 2022-06-28 DIAGNOSIS — I10 ESSENTIAL HYPERTENSION: Primary | ICD-10-CM

## 2022-06-28 DIAGNOSIS — E78.00 HYPERCHOLESTEREMIA: ICD-10-CM

## 2022-06-28 LAB
ALBUMIN SERPL-MCNC: 3.5 G/DL (ref 3.5–5)
ALBUMIN/GLOB SERPL: 1 {RATIO} (ref 1.1–2.2)
ALP SERPL-CCNC: 82 U/L (ref 45–117)
ALT SERPL-CCNC: 13 U/L (ref 12–78)
ANION GAP SERPL CALC-SCNC: 6 MMOL/L (ref 5–15)
AST SERPL-CCNC: 11 U/L (ref 15–37)
BILIRUB SERPL-MCNC: 0.4 MG/DL (ref 0.2–1)
BUN SERPL-MCNC: 30 MG/DL (ref 6–20)
BUN/CREAT SERPL: 19 (ref 12–20)
CALCIUM SERPL-MCNC: 12 MG/DL (ref 8.5–10.1)
CHLORIDE SERPL-SCNC: 109 MMOL/L (ref 97–108)
CO2 SERPL-SCNC: 26 MMOL/L (ref 21–32)
COMMENT, HOLDF: NORMAL
CREAT SERPL-MCNC: 1.57 MG/DL (ref 0.55–1.02)
ERYTHROCYTE [DISTWIDTH] IN BLOOD BY AUTOMATED COUNT: 15.2 % (ref 11.5–14.5)
FOLATE SERPL-MCNC: 10.1 NG/ML (ref 5–21)
GLOBULIN SER CALC-MCNC: 3.4 G/DL (ref 2–4)
GLUCOSE SERPL-MCNC: 114 MG/DL (ref 65–100)
HCT VFR BLD AUTO: 36.2 % (ref 35–47)
HGB BLD-MCNC: 11.1 G/DL (ref 11.5–16)
MAGNESIUM SERPL-MCNC: 1.5 MG/DL (ref 1.6–2.4)
MCH RBC QN AUTO: 27.4 PG (ref 26–34)
MCHC RBC AUTO-ENTMCNC: 30.7 G/DL (ref 30–36.5)
MCV RBC AUTO: 89.4 FL (ref 80–99)
NRBC # BLD: 0 K/UL (ref 0–0.01)
NRBC BLD-RTO: 0 PER 100 WBC
PLATELET # BLD AUTO: 264 K/UL (ref 150–400)
PMV BLD AUTO: 11.7 FL (ref 8.9–12.9)
POTASSIUM SERPL-SCNC: 4.8 MMOL/L (ref 3.5–5.1)
PROT SERPL-MCNC: 6.9 G/DL (ref 6.4–8.2)
RBC # BLD AUTO: 4.05 M/UL (ref 3.8–5.2)
SAMPLES BEING HELD,HOLD: NORMAL
SODIUM SERPL-SCNC: 141 MMOL/L (ref 136–145)
VIT B12 SERPL-MCNC: 1092 PG/ML (ref 193–986)
WBC # BLD AUTO: 7.4 K/UL (ref 3.6–11)

## 2022-06-28 PROCEDURE — G8754 DIAS BP LESS 90: HCPCS | Performed by: INTERNAL MEDICINE

## 2022-06-28 PROCEDURE — G8417 CALC BMI ABV UP PARAM F/U: HCPCS | Performed by: INTERNAL MEDICINE

## 2022-06-28 PROCEDURE — G8399 PT W/DXA RESULTS DOCUMENT: HCPCS | Performed by: INTERNAL MEDICINE

## 2022-06-28 PROCEDURE — G8536 NO DOC ELDER MAL SCRN: HCPCS | Performed by: INTERNAL MEDICINE

## 2022-06-28 PROCEDURE — G8752 SYS BP LESS 140: HCPCS | Performed by: INTERNAL MEDICINE

## 2022-06-28 PROCEDURE — 99215 OFFICE O/P EST HI 40 MIN: CPT | Performed by: INTERNAL MEDICINE

## 2022-06-28 PROCEDURE — 2022F DILAT RTA XM EVC RTNOPTHY: CPT | Performed by: INTERNAL MEDICINE

## 2022-06-28 PROCEDURE — G9899 SCRN MAM PERF RSLTS DOC: HCPCS | Performed by: INTERNAL MEDICINE

## 2022-06-28 PROCEDURE — G8427 DOCREV CUR MEDS BY ELIG CLIN: HCPCS | Performed by: INTERNAL MEDICINE

## 2022-06-28 PROCEDURE — G8510 SCR DEP NEG, NO PLAN REQD: HCPCS | Performed by: INTERNAL MEDICINE

## 2022-06-28 PROCEDURE — 1101F PT FALLS ASSESS-DOCD LE1/YR: CPT | Performed by: INTERNAL MEDICINE

## 2022-06-28 PROCEDURE — 1090F PRES/ABSN URINE INCON ASSESS: CPT | Performed by: INTERNAL MEDICINE

## 2022-06-28 PROCEDURE — 3017F COLORECTAL CA SCREEN DOC REV: CPT | Performed by: INTERNAL MEDICINE

## 2022-06-28 PROCEDURE — 1111F DSCHRG MED/CURRENT MED MERGE: CPT | Performed by: INTERNAL MEDICINE

## 2022-06-28 RX ORDER — POTASSIUM CHLORIDE 1500 MG/1
40 TABLET, FILM COATED, EXTENDED RELEASE ORAL 2 TIMES DAILY
COMMUNITY
Start: 2022-05-18 | End: 2023-05-18

## 2022-06-28 RX ORDER — GABAPENTIN 100 MG/1
200 CAPSULE ORAL 3 TIMES DAILY
COMMUNITY
Start: 2022-05-18 | End: 2022-06-28

## 2022-06-28 RX ORDER — LANOLIN ALCOHOL/MO/W.PET/CERES
400 CREAM (GRAM) TOPICAL DAILY
COMMUNITY

## 2022-06-28 RX ORDER — MELOXICAM 15 MG/1
TABLET ORAL
COMMUNITY
End: 2022-08-02 | Stop reason: SDUPTHER

## 2022-06-28 RX ORDER — AMLODIPINE BESYLATE 5 MG/1
5 TABLET ORAL DAILY
Qty: 90 TABLET | Refills: 3 | Status: SHIPPED | OUTPATIENT
Start: 2022-06-28

## 2022-06-28 RX ORDER — DOCUSATE SODIUM 100 MG/1
100 CAPSULE, LIQUID FILLED ORAL 2 TIMES DAILY
COMMUNITY

## 2022-06-28 RX ORDER — LATANOPROST 50 UG/ML
SOLUTION/ DROPS OPHTHALMIC
COMMUNITY

## 2022-06-28 NOTE — PROGRESS NOTES
Escobar Malik is a 67 y.o. female and presents with Hospital Follow Up and Medication Evaluation  . Subjective:    Hypertension Review:  The patient has hypertension . Diet and Lifestyle: generally follows a low sodium diet, exercises sporadically  Home BP Monitoring: is not measured at home. Pertinent ROS: taking medications as instructed, no medication side effects noted, no TIA's, no chest pain on exertion, no dyspnea on exertion, she has lower leg swelling of ankles. Dyslipidemia Review:  Patient presents for evaluation of lipids. Compliance with treatment thus far has been excellent. A repeat fasting lipid profile was done. The patient does not use medications that may worsen dyslipidemias . The patient exercises sporadically. [address] [place] [address]  She is taking Meloxacan      Anemia review:  Patient presents for follow up  evaluation of an anemia. Anemia was found by routine CBC. It had been present for several months. Associated signs & symptoms:none  The  most recent studies were improved    She has a history of hypokalemia,hypomag,and b12 def  She is on supplements    She has a history oh hyperparathyroidism and has not yet had the surgery  She is followed by endocrine  Review of Systems  Constitutional: negative for fevers, chills, anorexia and weight loss  Eyes:   negative for visual disturbance and irritation  ENT:   negative for tinnitus,sore throat,nasal congestion,ear pains. hoarseness  Respiratory:  negative for cough, hemoptysis, dyspnea,wheezing  CV:   negative for chest pain, palpitations, lower extremity edema  GI:   negative for nausea, vomiting, diarrhea, abdominal pain,melena  Endo:               negative for polyuria,polydipsia,polyphagia,heat intolerance  Genitourinary: negative for frequency, dysuria and hematuria  Integument:  negative for rash and pruritus  Hematologic:  negative for easy bruising and gum/nose bleeding  Musculoskel:  arthralgias, back pain, , joint pain  Neurological:   gait problems  Behavl/Psych: negative for feelings of anxiety, depression, mood changes    Past Medical History:   Diagnosis Date    Arthritis     Diabetes ([address]     NIDDM    Fracture     HTN (hypertension)     Hyperlipemia     Menopause      Past Surgical History:   Procedure Laterality Date    HX CATARACT REMOVAL Bilateral 2012, 2017    HX CHOLECYSTECTOMY  2015    HX COLONOSCOPY      HX ENDOSCOPY      HX FRACTURE TX Left     HX ORTHOPAEDIC  1969    lumbar discectomy      Social History     Socioeconomic History    Marital status:    Tobacco Use    Smoking status: Never Smoker    Smokeless tobacco: Never Used   Vaping Use    Vaping Use: Never used   Substance and Sexual Activity    Alcohol use: No    Drug use: No     Family History   Problem Relation Age of Onset    Diabetes Father     Kidney Disease Father     Diabetes Brother     Cancer Brother     Stroke Mother     Kidney Disease Brother     Cancer Brother     Other Brother         kidney stones    Cancer Sister     Ovarian Cancer Sister     Cancer Sister         Neck/Head?  Heart Disease Brother     Anesth Problems Neg Hx      Current Outpatient Medications   Medication Sig Dispense Refill    latanoprost (XALATAN) 0.005 % ophthalmic solution       meloxicam (MOBIC) 15 mg tablet       potassium chloride SR (K-TAB) 20 mEq tablet Take 40 mEq by mouth two (2) times a day.  magnesium oxide (MAG-OX) 400 mg tablet Take 400 mg by mouth daily.  docusate sodium (COLACE) 100 mg capsule Take 100 mg by mouth two (2) times a day.  amLODIPine (NORVASC) 5 mg tablet Take 1 Tablet by mouth daily. 90 Tablet 3    cyanocobalamin (VITAMIN B12) 1,000 mcg/mL injection 1,000 mcg by IntraMUSCular route Once every 2 weeks.  dorzolamide HCl/timolol maleat (DORZOLAMIDE-TIMOLOL OP) Administer 1 Drop to both eyes two (2) times a day.       metFORMIN (GLUCOPHAGE) 1,000 mg tablet Take 1 Tablet by mouth two (2) times a day. 180 Tablet 3    metoprolol tartrate (LOPRESSOR) 100 mg IR tablet Take 1 Tablet by mouth two (2) times a day. 60 Tablet 2    cloNIDine HCL (CATAPRES) 0.1 mg tablet Take 1 Tablet by mouth two (2) times a day. Take  by mouth two (2) times a day. 180 Tablet 3    ferrous sulfate 325 mg (65 mg iron) tablet Take 1 Tablet by mouth daily. 30 Tablet 3    glipiZIDE SR (GLUCOTROL XL) 5 mg CR tablet Take 1 Tablet by mouth daily. 90 Tablet 3    pravastatin (PRAVACHOL) 40 mg tablet Take 1 Tablet by mouth daily. 90 Tablet 3    losartan (COZAAR) 50 mg tablet Take 1 Tablet by mouth daily. 90 Tablet 3    latanoprost (XALATAN) 0.005 % ophthalmic solution Administer 1 Drop to both eyes nightly.  ReliOn Prime Meter misc USE TO CHECK BLOOD SUGAR ONCE DAILY      ReliOn Prime Test Strips strip USE TO CHECK BLOOD SUGAR ONCE DAILY      aspirin 81 mg chewable tablet Take 81 mg by mouth every other day.        Allergies   Allergen Reactions    Enalapril Swelling       Objective:  Visit Vitals  /84   Pulse 77   Temp 98 °F (36.7 °C) (Oral)   Resp 16   Ht 5' 4\" (1.626 m)   Wt 175 lb (79.4 kg)   SpO2 99%   BMI 30.04 kg/m²     Physical Exam:   General appearance - alert, well appearing, and in no distress  Mental status - alert, oriented to person, place, and time  EYE-CASEY, EOMI, corneas normal, no foreign bodies  ENT-ENT exam normal, no neck nodes or sinus tenderness  Nose - normal and patent, no erythema, discharge or polyps  Mouth - mucous membranes moist, pharynx normal without lesions  Neck - supple, no significant adenopathy   Chest - clear to auscultation, no wheezes, rales or rhonchi, symmetric air entry   Heart - normal rate, regular rhythm, normal S1, S2, no murmurs, rubs, clicks or gallops   Abdomen - soft, nontender, nondistended, no masses or organomegaly  Lymph- no adenopathy palpable  Ext-peripheral pulses normal, 3+bilateral leg edema edema, no clubbing or cyanosis  Skin-Warm and dry. no hyperpigmentation, vitiligo, or suspicious lesions  Neuro -alert, oriented, normal speech, no focal findings or movement disorder noted  Neck-normal C-spine, no tenderness, full ROM without pain  Feet-no nail deformities or callus formation with good pulses noted      Results for orders placed or performed during the hospital encounter of 05/04/22   HEMOGLOBIN A1C WITH EAG   Result Value Ref Range    Hemoglobin A1c 7.0 (H) 4.0 - 5.6 %    Est. average glucose 154 mg/dL   EKG, 12 LEAD, INITIAL   Result Value Ref Range    Ventricular Rate 63 BPM    Atrial Rate 63 BPM    P-R Interval 166 ms    QRS Duration 118 ms    Q-T Interval 428 ms    QTC Calculation (Bezet) 437 ms    Calculated P Axis 18 degrees    Calculated R Axis -51 degrees    Calculated T Axis -29 degrees    Diagnosis       Normal sinus rhythm  Leftward axis  Voltage criteria for left ventricular hypertrophy    No previous ECGs available  Confirmed by Jose De Jesus M.D., Donal Brazen ([id number]) on 5/4/2022 3:23:49 PM         Assessment/Plan:    ICD-10-CM ICD-9-CM    1. Essential hypertension  A20 317.9 METABOLIC PANEL, COMPREHENSIVE      CBC W/O DIFF   2. Parathyroid adenoma  D35.1 227.1    3. 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 Hypercholesteremia  E78.00 272.0    5. Primary hyperparathyroidism (HCC)  E21.0 252.01    6. Localized edema  R60.0 782.3    7. B12 deficiency  E53.8 266.2 VITAMIN B12 & FOLATE   8. Hypokalemia  C25.5 141.7 METABOLIC PANEL, COMPREHENSIVE   9. Hypomagnesemia  E83.42 275.2 MAGNESIUM   10.  Primary osteoarthritis of right wrist  M19.031 715.13      Orders Placed This Encounter    METABOLIC PANEL, COMPREHENSIVE     Standing Status:   Future     Standing Expiration Date:   6/28/2023    CBC W/O DIFF     Standing Status:   Future     Standing Expiration Date:   6/28/2023    MAGNESIUM     Standing Status:   Future     Standing Expiration Date:   6/28/2023   Darleen Montoya VITAMIN B12 & FOLATE     Standing Status:   Future     Standing Expiration Date:   2023    latanoprost (XALATAN) 0.005 % ophthalmic solution    DISCONTD: gabapentin (NEURONTIN) 100 mg capsule     Sig: Take 200 mg by mouth three (3) times daily.  meloxicam (MOBIC) 15 mg tablet    potassium chloride SR (K-TAB) 20 mEq tablet     Sig: Take 40 mEq by mouth two (2) times a day.  magnesium oxide (MAG-OX) 400 mg tablet     Sig: Take 400 mg by mouth daily.  docusate sodium (COLACE) 100 mg capsule     Sig: Take 100 mg by mouth two (2) times a day.  amLODIPine (NORVASC) 5 mg tablet     Sig: Take 1 Tablet by mouth daily. Dispense:  90 Tablet     Refill:  3     routine labs ordered,Take 81mg aspirin daily  Patient Instructions   MyChart Activation    Thank you for requesting access to [employer]. Please follow the instructions below to securely access and download your online medical record. BonitaSoft allows you to send messages to your doctor, view your test results, renew your prescriptions, schedule appointments, and more. How Do I Sign Up? 1. In your internet browser, go to [email or web address]  2. Click on the First Time User? Click Here link in the Sign In box. You will be redirect to the New Member Sign Up page. 3. Enter your BonitaSoft Access Code exactly as it appears below. You will not need to use this code after youve completed the sign-up process. If you do not sign up before the expiration date, you must request a new code. BonitaSoft Access Code: [email or web address]  Expires: 2022  8:16 AM (This is the date your BonitaSoft access code will )    4. Enter the last four digits of your Social Security Number (xxxx) and Date of Birth (mm/dd/yyyy) as indicated and click Submit. You will be taken to the next sign-up page. 5. Create a BonitaSoft ID. This will be your BonitaSoft login ID and cannot be changed, so think of one that is secure and easy to remember. 6. Create a BonitaSoft password.  You can change your password at any time. 7. Enter your Password Reset Question and Answer. This can be used at a later time if you forget your password. 8. Enter your e-mail address. You will receive e-mail notification when new information is available in [address] 9. Click Sign Up. You can now view and download portions of your medical record. 10. Click the Download Summary menu link to download a portable copy of your medical information. Additional Information    If you have questions, please visit the Frequently Asked Questions section of the [employer] website at [email or web address] Remember, [employer] is NOT to be used for urgent needs. For medical emergencies, dial 911. Follow-up and Dispositions    · Return in about 4 weeks (around 7/26/2022), or if symptoms worsen or fail to improve. I have reviewed with the patient details of the assessment and plan and all questions were answered. Relevent patient education was performed. The most recent lab findings were reviewed with the patient. An After Visit Summary was printed and given to the patient.     \

## 2022-06-28 NOTE — PATIENT INSTRUCTIONS
[employer] Activation    Thank you for requesting access to [employer]. Please follow the instructions below to securely access and download your online medical record. [employer] allows you to send messages to your doctor, view your test results, renew your prescriptions, schedule appointments, and more. How Do I Sign Up? 1. In your internet browser, go to [email or web address]  2. Click on the First Time User? Click Here link in the Sign In box. You will be redirect to the New Member Sign Up page. 3. Enter your [employer] Access Code exactly as it appears below. You will not need to use this code after youve completed the sign-up process. If you do not sign up before the expiration date, you must request a new code. [employer] Access Code: [email or web address]  Expires: 2022  8:16 AM (This is the date your [employer] access code will )    4. Enter the last four digits of your Social Security Number (xxxx) and Date of Birth (mm/dd/yyyy) as indicated and click Submit. You will be taken to the next sign-up page. 5. Create a [employer] ID. This will be your [employer] login ID and cannot be changed, so think of one that is secure and easy to remember. 6. Create a [employer] password. You can change your password at any time. 7. Enter your Password Reset Question and Answer. This can be used at a later time if you forget your password. 8. Enter your e-mail address. You will receive e-mail notification when new information is available in [address]. 9. Click Sign Up. You can now view and download portions of your medical record. 10. Click the Download Summary menu link to download a portable copy of your medical information. Additional Information    If you have questions, please visit the Frequently Asked Questions section of the [employer] website at [email or web address] Remember, [employer] is NOT to be used for urgent needs. For medical emergencies, dial 911.

## 2022-06-28 NOTE — PROGRESS NOTES
1. Have you been to the ER, urgent care clinic since your last visit? Hospitalized since your last visit? YES/Arthritis pyogenic/[facility]    2. Have you seen or consulted any other health care providers outside of the [address] [place] [address] since your last visit? Include any pap smears or colon screening.  No    3 most recent PHQ Screens 6/28/2022   PHQ Not Done -   Little interest or pleasure in doing things Not at all   Feeling down, depressed, irritable, or hopeless Not at all   Total Score PHQ 2 0     Chief Complaint   Patient presents with   [facility] Follow Up    Medication Evaluation

## 2022-06-30 ENCOUNTER — TELEPHONE (OUTPATIENT)
Dept: INTERNAL MEDICINE CLINIC | Age: 72
End: 2022-06-30

## 2022-06-30 NOTE — TELEPHONE ENCOUNTER
[address] NEEDS TO CONFIRM THAT DR GRAVES WILL SIGN OFF ON HOME HEALTH ORDERS WHEN PATIENT IS [address]. PLEASE CALL.

## 2022-07-08 RX ORDER — METOPROLOL TARTRATE 100 MG/1
TABLET ORAL
Qty: 60 TABLET | Refills: 2 | Status: SHIPPED | OUTPATIENT
Start: 2022-07-08 | End: 2022-10-11

## 2022-07-19 NOTE — TELEPHONE ENCOUNTER
A voice message requesting a refill on behalf of the pt. [phone]    Historical medication: Vitamin B12 Injection     Last visit 2022 MD Adam Grover   Next appointment 2022 MD Henry Perez     Intervention Detail: New Rx: 1, reason: Patient Preference   Time Spent (min): 5      Requested Prescriptions     Pending Prescriptions Disp Refills    cyanocobalamin (VITAMIN B12) 1,000 mcg/mL injection 1 mL 0     Si mL by IntraMUSCular route Once every 2 weeks.

## 2022-07-20 RX ORDER — CYANOCOBALAMIN 1000 UG/ML
1000 INJECTION, SOLUTION INTRAMUSCULAR; SUBCUTANEOUS EVERY 2 WEEKS
Qty: 1 ML | Refills: 0
Start: 2022-07-20

## 2022-08-02 ENCOUNTER — OFFICE VISIT (OUTPATIENT)
Dept: INTERNAL MEDICINE CLINIC | Age: 72
End: 2022-08-02
Payer: MEDICARE

## 2022-08-02 VITALS
RESPIRATION RATE: 16 BRPM | WEIGHT: 172 LBS | TEMPERATURE: 98 F | BODY MASS INDEX: 29.37 KG/M2 | OXYGEN SATURATION: 98 % | HEART RATE: 73 BPM | DIASTOLIC BLOOD PRESSURE: 84 MMHG | HEIGHT: 64 IN | SYSTOLIC BLOOD PRESSURE: 138 MMHG

## 2022-08-02 DIAGNOSIS — E53.8 B12 DEFICIENCY: ICD-10-CM

## 2022-08-02 DIAGNOSIS — I10 ESSENTIAL HYPERTENSION: ICD-10-CM

## 2022-08-02 DIAGNOSIS — E78.00 HYPERCHOLESTEREMIA: ICD-10-CM

## 2022-08-02 DIAGNOSIS — G56.01 CARPAL TUNNEL SYNDROME OF RIGHT WRIST: Primary | ICD-10-CM

## 2022-08-02 PROCEDURE — 1101F PT FALLS ASSESS-DOCD LE1/YR: CPT | Performed by: INTERNAL MEDICINE

## 2022-08-02 PROCEDURE — G8754 DIAS BP LESS 90: HCPCS | Performed by: INTERNAL MEDICINE

## 2022-08-02 PROCEDURE — 99214 OFFICE O/P EST MOD 30 MIN: CPT | Performed by: INTERNAL MEDICINE

## 2022-08-02 PROCEDURE — 3017F COLORECTAL CA SCREEN DOC REV: CPT | Performed by: INTERNAL MEDICINE

## 2022-08-02 PROCEDURE — G9899 SCRN MAM PERF RSLTS DOC: HCPCS | Performed by: INTERNAL MEDICINE

## 2022-08-02 PROCEDURE — G8752 SYS BP LESS 140: HCPCS | Performed by: INTERNAL MEDICINE

## 2022-08-02 PROCEDURE — G8417 CALC BMI ABV UP PARAM F/U: HCPCS | Performed by: INTERNAL MEDICINE

## 2022-08-02 PROCEDURE — G8427 DOCREV CUR MEDS BY ELIG CLIN: HCPCS | Performed by: INTERNAL MEDICINE

## 2022-08-02 PROCEDURE — G8510 SCR DEP NEG, NO PLAN REQD: HCPCS | Performed by: INTERNAL MEDICINE

## 2022-08-02 PROCEDURE — G8399 PT W/DXA RESULTS DOCUMENT: HCPCS | Performed by: INTERNAL MEDICINE

## 2022-08-02 PROCEDURE — 20526 THER INJECTION CARP TUNNEL: CPT | Performed by: INTERNAL MEDICINE

## 2022-08-02 PROCEDURE — 1090F PRES/ABSN URINE INCON ASSESS: CPT | Performed by: INTERNAL MEDICINE

## 2022-08-02 PROCEDURE — G8536 NO DOC ELDER MAL SCRN: HCPCS | Performed by: INTERNAL MEDICINE

## 2022-08-02 RX ORDER — SYRINGE AND NEEDLE,INSULIN,1ML 31GX15/64"
SYRINGE, EMPTY DISPOSABLE MISCELLANEOUS
Qty: 50 PEN NEEDLE | Refills: 3 | Status: SHIPPED | OUTPATIENT
Start: 2022-08-02 | End: 2022-08-30

## 2022-08-02 RX ORDER — LOSARTAN POTASSIUM 50 MG/1
50 TABLET ORAL DAILY
Qty: 90 TABLET | Refills: 3 | Status: SHIPPED | OUTPATIENT
Start: 2022-08-02

## 2022-08-02 RX ORDER — MELOXICAM 15 MG/1
15 TABLET ORAL DAILY
Qty: 90 TABLET | Refills: 3 | Status: SHIPPED | OUTPATIENT
Start: 2022-08-02

## 2022-08-02 NOTE — PATIENT INSTRUCTIONS
[employer] Activation    Thank you for requesting access to [employer]. Please follow the instructions below to securely access and download your online medical record. [employer] allows you to send messages to your doctor, view your test results, renew your prescriptions, schedule appointments, and more. How Do I Sign Up? In your internet browser, go to [email or web address]  Click on the First Time User? Click Here link in the Sign In box. You will be redirect to the New Member Sign Up page. Enter your [employer] Access Code exactly as it appears below. You will not need to use this code after youve completed the sign-up process. If you do not sign up before the expiration date, you must request a new code. [employer] Access Code: [email or web address]  Expires: 2022  8:16 AM (This is the date your [employer] access code will )    Enter the last four digits of your Social Security Number (xxxx) and Date of Birth (mm/dd/yyyy) as indicated and click Submit. You will be taken to the next sign-up page. Create a [employer] ID. This will be your [employer] login ID and cannot be changed, so think of one that is secure and easy to remember. Create a [employer] password. You can change your password at any time. Enter your Password Reset Question and Answer. This can be used at a later time if you forget your password. Enter your e-mail address. You will receive e-mail notification when new information is available in [address] Click Sign Up. You can now view and download portions of your medical record. Click the [place] link to download a portable copy of your medical information. Additional Information    If you have questions, please visit the Frequently Asked Questions section of the [employer] website at [email or web address] Remember, [employer] is NOT to be used for urgent needs. For medical emergencies, dial 911.

## 2022-08-02 NOTE — PROGRESS NOTES
1. Have you been to the ER, urgent care clinic since your last visit? Hospitalized since your last visit?no    2. Have you seen or consulted any other health care providers outside of the [address] [place] [address] since your last visit? Include any pap smears or colon screening.  No    Chief Complaint   Patient presents with    Arthritis    Results     3 most recent PHQ Screens 8/2/2022   PHQ Not Done -   Little interest or pleasure in doing things Not at all   Feeling down, depressed, irritable, or hopeless Not at all   Total Score PHQ 2 0

## 2022-08-02 NOTE — PROGRESS NOTES
Caleb Cedeno is a 67 y.o. female and presents with Arthritis and Results  . Subjective:    Carpal Tunnel Syndrome Review:  Patient presents for evaluation of pain in hand(s), hand paresthesias of the first two fingers. Onset of the symptoms was several months ago. Current symptoms include tingling and weakness involving the medial aspect of the bilateral hand(s): severity: moderate. Patient's overall course: gradually worsening. Patient has had no prior elbow problems. Previous visits for this problem: none. Evaluation to date: noted  Treatment to date: none. Hypertension Review:  The patient has hypertension . Diet and Lifestyle: generally follows a low sodium diet, exercises sporadically  Home BP Monitoring: is not measured at home. Pertinent ROS: taking medications as instructed, no medication side effects noted, no TIA's, no chest pain on exertion, no dyspnea on exertion, she has lower leg swelling of ankles. Dyslipidemia Review:  Patient presents for evaluation of lipids. Compliance with treatment thus far has been excellent. A repeat fasting lipid profile was done. The patient does not use medications that may worsen dyslipidemias . The patient exercises sporadically. She is taking Meloxacan      Anemia review:  Patient presents for follow up  evaluation of an anemia. Anemia was found by routine CBC. It had been present for several months. Associated signs & symptoms:none  The  most recent studies were improved        She has a history of hyperparathyroidism and has not yet had the surgery  She is followed by endocrine    Review of Systems  Constitutional: negative for fevers, chills, anorexia and weight loss  Eyes:   negative for visual disturbance and irritation  ENT:   negative for tinnitus,sore throat,nasal congestion,ear pains. hoarseness  Respiratory:  negative for cough, hemoptysis, dyspnea,wheezing  CV:   negative for chest pain, palpitations, lower extremity edema  GI: negative for nausea, vomiting, diarrhea, abdominal pain,melena  Endo:               negative for polyuria,polydipsia,polyphagia,heat intolerance  Genitourinary: negative for frequency, dysuria and hematuria  Integument:  negative for rash and pruritus  Hematologic:  negative for easy bruising and gum/nose bleeding  Musculoskel:  arthralgias, back pain, , joint pain  Neurological:   gait problems  Behavl/Psych: negative for feelings of anxiety, depression, mood changes    Past Medical History:   Diagnosis Date    Arthritis     Diabetes ([address]     NIDDM    Fracture     HTN (hypertension)     Hyperlipemia     Menopause      Past Surgical History:   Procedure Laterality Date    HX CATARACT REMOVAL Bilateral 2012, 2017    HX CHOLECYSTECTOMY  2015    HX COLONOSCOPY      HX ENDOSCOPY      HX FRACTURE TX Left     HX ORTHOPAEDIC  1969    lumbar discectomy      Social History     Socioeconomic History    Marital status:    Tobacco Use    Smoking status: Never    Smokeless tobacco: Never   Vaping Use    Vaping Use: Never used   Substance and Sexual Activity    Alcohol use: No    Drug use: No     Family History   Problem Relation Age of Onset    Diabetes Father     Kidney Disease Father     Diabetes Brother     Cancer Brother     Stroke Mother     Kidney Disease Brother     Cancer Brother     Other Brother         kidney stones    Cancer Sister     Ovarian Cancer Sister     Cancer Sister         Neck/Head? Heart Disease Brother     Anesth Problems Neg Hx      Current Outpatient Medications   Medication Sig Dispense Refill    cyanocobalamin (VITAMIN B12) 1,000 mcg/mL injection 1 mL by IntraMUSCular route Once every 2 weeks. 1 mL 0    metoprolol tartrate (LOPRESSOR) 100 mg IR tablet TAKE 1 TABLET BY MOUTH TWICE DAILY 60 Tablet 2    latanoprost (XALATAN) 0.005 % ophthalmic solution       meloxicam (MOBIC) 15 mg tablet       potassium chloride SR (K-TAB) 20 mEq tablet Take 40 mEq by mouth two (2) times a day. docusate sodium (COLACE) 100 mg capsule Take 100 mg by mouth two (2) times a day. amLODIPine (NORVASC) 5 mg tablet Take 1 Tablet by mouth daily. 90 Tablet 3    dorzolamide HCl/timolol maleat (DORZOLAMIDE-TIMOLOL OP) Administer 1 Drop to both eyes two (2) times a day. metFORMIN (GLUCOPHAGE) 1,000 mg tablet Take 1 Tablet by mouth two (2) times a day. 180 Tablet 3    cloNIDine HCL (CATAPRES) 0.1 mg tablet Take 1 Tablet by mouth two (2) times a day. Take  by mouth two (2) times a day. 180 Tablet 3    ferrous sulfate 325 mg (65 mg iron) tablet Take 1 Tablet by mouth daily. 30 Tablet 3    glipiZIDE SR (GLUCOTROL XL) 5 mg CR tablet Take 1 Tablet by mouth daily. 90 Tablet 3    pravastatin (PRAVACHOL) 40 mg tablet Take 1 Tablet by mouth daily. 90 Tablet 3    losartan (COZAAR) 50 mg tablet Take 1 Tablet by mouth daily. 90 Tablet 3    ReliOn Prime Meter misc USE TO CHECK BLOOD SUGAR ONCE DAILY      ReliOn Prime Test Strips strip USE TO CHECK BLOOD SUGAR ONCE DAILY      latanoprost (XALATAN) 0.005 % ophthalmic solution Administer 1 Drop to both eyes nightly. aspirin 81 mg chewable tablet Take 81 mg by mouth every other day. magnesium oxide (MAG-OX) 400 mg tablet Take 400 mg by mouth daily.  (Patient not taking: Reported on 8/2/2022)       Allergies   Allergen Reactions    Enalapril Swelling       Objective:  Visit Vitals  /84   Pulse 73   Temp 98 °F (36.7 °C) (Oral)   Resp 16   Ht 5' 4\" (1.626 m)   Wt 172 lb (78 kg)   SpO2 98%   BMI 29.52 kg/m²     Physical Exam:   General appearance - alert, well appearing, and in no distress  Mental status - alert, oriented to person, place, and time  EYE-CASEY, EOMI, corneas normal, no foreign bodies  ENT-ENT exam normal, no neck nodes or sinus tenderness  Nose - normal and patent, no erythema, discharge or polyps  Mouth - mucous membranes moist, pharynx normal without lesions  Neck - supple, no significant adenopathy   Chest - clear to auscultation, no wheezes, rales or rhonchi, symmetric air entry   Heart - normal rate, regular rhythm, normal S1, S2, no murmurs, rubs, clicks or gallops   Abdomen - soft, nontender, nondistended, no masses or organomegaly  Lymph- no adenopathy palpable  Ext-peripheral pulses normal, 3+bilateral leg edema edema, no clubbing or cyanosis  Skin-Warm and dry. no hyperpigmentation, vitiligo, or suspicious lesions  Neuro -alert, oriented, normal speech, no focal findings or movement disorder noted  Neck-normal C-spine, no tenderness, full ROM without pain  Feet-no nail deformities or callus formation with good pulses noted  Lt.hand-positive phalen sign    Results for orders placed or performed in visit on 93/28/99   METABOLIC PANEL, COMPREHENSIVE   Result Value Ref Range    Sodium 141 136 - 145 mmol/L    Potassium 4.8 3.5 - 5.1 mmol/L    Chloride 109 (H) 97 - 108 mmol/L    CO2 26 21 - 32 mmol/L    Anion gap 6 5 - 15 mmol/L    Glucose 114 (H) 65 - 100 mg/dL    BUN 30 (H) 6 - 20 MG/DL    Creatinine 1.57 (H) 0.55 - 1.02 MG/DL    BUN/Creatinine ratio 19 12 - 20      GFR est AA 39 (L) >60 ml/min/1.73m2    GFR est non-AA 32 (L) >60 ml/min/1.73m2    Calcium 12.0 (H) 8.5 - 10.1 MG/DL    Bilirubin, total 0.4 0.2 - 1.0 MG/DL    ALT (SGPT) 13 12 - 78 U/L    AST (SGOT) 11 (L) 15 - 37 U/L    Alk.  phosphatase 82 45 - 117 U/L    Protein, total 6.9 6.4 - 8.2 g/dL    Albumin 3.5 3.5 - 5.0 g/dL    Globulin 3.4 2.0 - 4.0 g/dL    A-G Ratio 1.0 (L) 1.1 - 2.2     MAGNESIUM   Result Value Ref Range    Magnesium 1.5 (L) 1.6 - 2.4 mg/dL   VITAMIN B12 & FOLATE   Result Value Ref Range    Vitamin B12 1,092 (H) 193 - 986 pg/mL    Folate 10.1 5.0 - 21.0 ng/mL   CBC W/O DIFF   Result Value Ref Range    WBC 7.4 3.6 - 11.0 K/uL    RBC 4.05 3.80 - 5.20 M/uL    HGB 11.1 (L) 11.5 - 16.0 g/dL    HCT 36.2 35.0 - 47.0 %    MCV 89.4 80.0 - 99.0 FL    MCH 27.4 26.0 - 34.0 PG    MCHC 30.7 30.0 - 36.5 g/dL    RDW 15.2 (H) 11.5 - 14.5 %    PLATELET 619 835 - 263 K/uL    MPV 11.7 8.9 - 12.9 FL    NRBC 0.0 0  WBC    ABSOLUTE NRBC 0.00 0.00 - 0.01 K/uL   SAMPLES BEING HELD   Result Value Ref Range    SAMPLES BEING HELD 1SST     COMMENT        Add-on orders for these samples will be processed based on acceptable specimen integrity and analyte stability, which may vary by analyte. Assessment/Plan:    ICD-10-CM ICD-9-CM    1. Carpal tunnel syndrome of right wrist  G56.01 354.0       2. Essential hypertension  I10 401.9       3. Hypercholesteremia  E78.00 272.0       4. B12 deficiency  E53.8 266.2           No orders of the defined types were placed in this encounter. routine labs ordered,Take 81mg aspirin daily    Indications:   Symptom relief from osteoarthritis    Procedure:  After consent was obtained, using sterile technique the  hand carpal tunnel sheath  using  alcohol . Local anesthetic used: 1% lidocaine. .Kenalog 40 mg was mixed with 1% lidocaine 2 ml  and injected into the sheath and the needle withdrawn. The procedure was well tolerated. The patient is asked to continue to rest the joint for a few more days before resuming regular activities. It may be more painful for the first 1-2 days. Watch for fever, or increased swelling or persistent pain in the joint. Call or return to clinic prn if such symptoms occur or there is failure to improve as anticipated. Patient Instructions   [employer] Activation    Thank you for requesting access to [employer]. Please follow the instructions below to securely access and download your online medical record. [employer] allows you to send messages to your doctor, view your test results, renew your prescriptions, schedule appointments, and more. How Do I Sign Up? In your internet browser, go to [email or web address]  Click on the First Time User? Click Here link in the Sign In box. You will be redirect to the New Member Sign Up page. Enter your [employer] Access Code exactly as it appears below.  You will not need to use this code after youve completed the sign-up process. If you do not sign up before the expiration date, you must request a new code. [employer] Access Code: [email or web address]  Expires: 2022  8:16 AM (This is the date your [employer] access code will )    Enter the last four digits of your Social Security Number (xxxx) and Date of Birth (mm/dd/yyyy) as indicated and click Submit. You will be taken to the next sign-up page. Create a [facility] [employer] ID. This will be your [employer] login ID and cannot be changed, so think of one that is secure and easy to remember. Create a [employer] password. You can change your password at any time. Enter your Password Reset Question and Answer. This can be used at a later time if you forget your password. Enter your e-mail address. You will receive e-mail notification when new information is available in [address] Click Sign Up. You can now view and download portions of your medical record. Click the [employer] link to download a portable copy of your medical information. Additional Information    If you have questions, please visit the Frequently Asked Questions section of the [employer] website at [email or web address] Remember, [employer] is NOT to be used for urgent needs. For medical emergencies, dial 911. Follow-up and Dispositions    Return in about 4 weeks (around 2022), or if symptoms worsen or fail to improve. I have reviewed with the patient details of the assessment and plan and all questions were answered. Relevent patient education was performed. The most recent lab findings were reviewed with the patient. An After Visit Summary was printed and given to the patient.     \

## 2022-08-30 ENCOUNTER — OFFICE VISIT (OUTPATIENT)
Dept: INTERNAL MEDICINE CLINIC | Age: 72
End: 2022-08-30
Payer: MEDICARE

## 2022-08-30 VITALS
BODY MASS INDEX: 29.37 KG/M2 | SYSTOLIC BLOOD PRESSURE: 138 MMHG | RESPIRATION RATE: 19 BRPM | HEART RATE: 75 BPM | WEIGHT: 172 LBS | HEIGHT: 64 IN | DIASTOLIC BLOOD PRESSURE: 88 MMHG | OXYGEN SATURATION: 95 % | TEMPERATURE: 97.9 F

## 2022-08-30 DIAGNOSIS — E21.0 PRIMARY HYPERPARATHYROIDISM (HCC): Primary | ICD-10-CM

## 2022-08-30 DIAGNOSIS — N18.2 TYPE 2 DIABETES MELLITUS WITH STAGE 2 CHRONIC KIDNEY DISEASE, WITHOUT LONG-TERM CURRENT USE OF INSULIN (HCC): ICD-10-CM

## 2022-08-30 DIAGNOSIS — E53.8 B12 DEFICIENCY: ICD-10-CM

## 2022-08-30 DIAGNOSIS — I10 ESSENTIAL HYPERTENSION: ICD-10-CM

## 2022-08-30 DIAGNOSIS — R79.89 PRERENAL AZOTEMIA: ICD-10-CM

## 2022-08-30 DIAGNOSIS — E11.22 TYPE 2 DIABETES MELLITUS WITH STAGE 2 CHRONIC KIDNEY DISEASE, WITHOUT LONG-TERM CURRENT USE OF INSULIN (HCC): ICD-10-CM

## 2022-08-30 DIAGNOSIS — E78.00 HYPERCHOLESTEREMIA: ICD-10-CM

## 2022-08-30 LAB
ALBUMIN SERPL-MCNC: 4 G/DL (ref 3.5–5)
ALBUMIN/GLOB SERPL: 1.3 {RATIO} (ref 1.1–2.2)
ALP SERPL-CCNC: 94 U/L (ref 45–117)
ALT SERPL-CCNC: 15 U/L (ref 12–78)
ANION GAP SERPL CALC-SCNC: 7 MMOL/L (ref 5–15)
AST SERPL-CCNC: 10 U/L (ref 15–37)
BILIRUB SERPL-MCNC: 0.3 MG/DL (ref 0.2–1)
BUN SERPL-MCNC: 24 MG/DL (ref 6–20)
BUN/CREAT SERPL: 23 (ref 12–20)
CALCIUM SERPL-MCNC: 11.2 MG/DL (ref 8.5–10.1)
CHLORIDE SERPL-SCNC: 108 MMOL/L (ref 97–108)
CO2 SERPL-SCNC: 28 MMOL/L (ref 21–32)
CREAT SERPL-MCNC: 1.04 MG/DL (ref 0.55–1.02)
ERYTHROCYTE [DISTWIDTH] IN BLOOD BY AUTOMATED COUNT: 15 % (ref 11.5–14.5)
GLOBULIN SER CALC-MCNC: 3.2 G/DL (ref 2–4)
GLUCOSE POC: 125 MG/DL
GLUCOSE SERPL-MCNC: 107 MG/DL (ref 65–100)
HBA1C MFR BLD HPLC: 6.2 %
HCT VFR BLD AUTO: 40.3 % (ref 35–47)
HGB BLD-MCNC: 12.7 G/DL (ref 11.5–16)
MCH RBC QN AUTO: 28 PG (ref 26–34)
MCHC RBC AUTO-ENTMCNC: 31.5 G/DL (ref 30–36.5)
MCV RBC AUTO: 88.8 FL (ref 80–99)
NRBC # BLD: 0 K/UL (ref 0–0.01)
NRBC BLD-RTO: 0 PER 100 WBC
PLATELET # BLD AUTO: 228 K/UL (ref 150–400)
PMV BLD AUTO: 12 FL (ref 8.9–12.9)
POTASSIUM SERPL-SCNC: 5 MMOL/L (ref 3.5–5.1)
PROT SERPL-MCNC: 7.2 G/DL (ref 6.4–8.2)
RBC # BLD AUTO: 4.54 M/UL (ref 3.8–5.2)
SODIUM SERPL-SCNC: 143 MMOL/L (ref 136–145)
WBC # BLD AUTO: 9.8 K/UL (ref 3.6–11)

## 2022-08-30 PROCEDURE — G8536 NO DOC ELDER MAL SCRN: HCPCS | Performed by: INTERNAL MEDICINE

## 2022-08-30 PROCEDURE — G9899 SCRN MAM PERF RSLTS DOC: HCPCS | Performed by: INTERNAL MEDICINE

## 2022-08-30 PROCEDURE — G8427 DOCREV CUR MEDS BY ELIG CLIN: HCPCS | Performed by: INTERNAL MEDICINE

## 2022-08-30 PROCEDURE — G8417 CALC BMI ABV UP PARAM F/U: HCPCS | Performed by: INTERNAL MEDICINE

## 2022-08-30 PROCEDURE — G8510 SCR DEP NEG, NO PLAN REQD: HCPCS | Performed by: INTERNAL MEDICINE

## 2022-08-30 PROCEDURE — 3017F COLORECTAL CA SCREEN DOC REV: CPT | Performed by: INTERNAL MEDICINE

## 2022-08-30 PROCEDURE — G8752 SYS BP LESS 140: HCPCS | Performed by: INTERNAL MEDICINE

## 2022-08-30 PROCEDURE — 83036 HEMOGLOBIN GLYCOSYLATED A1C: CPT | Performed by: INTERNAL MEDICINE

## 2022-08-30 PROCEDURE — 99214 OFFICE O/P EST MOD 30 MIN: CPT | Performed by: INTERNAL MEDICINE

## 2022-08-30 PROCEDURE — 1101F PT FALLS ASSESS-DOCD LE1/YR: CPT | Performed by: INTERNAL MEDICINE

## 2022-08-30 PROCEDURE — 2022F DILAT RTA XM EVC RTNOPTHY: CPT | Performed by: INTERNAL MEDICINE

## 2022-08-30 PROCEDURE — G8754 DIAS BP LESS 90: HCPCS | Performed by: INTERNAL MEDICINE

## 2022-08-30 PROCEDURE — 82962 GLUCOSE BLOOD TEST: CPT | Performed by: INTERNAL MEDICINE

## 2022-08-30 PROCEDURE — G8399 PT W/DXA RESULTS DOCUMENT: HCPCS | Performed by: INTERNAL MEDICINE

## 2022-08-30 PROCEDURE — 1090F PRES/ABSN URINE INCON ASSESS: CPT | Performed by: INTERNAL MEDICINE

## 2022-08-30 RX ORDER — BLOOD SUGAR DIAGNOSTIC
STRIP MISCELLANEOUS
Qty: 100 STRIP | Refills: 3 | Status: SHIPPED | OUTPATIENT
Start: 2022-08-30

## 2022-08-30 RX ORDER — LANCETS
EACH MISCELLANEOUS
Qty: 50 EACH | Refills: 11 | Status: SHIPPED | OUTPATIENT
Start: 2022-08-30

## 2022-08-30 RX ORDER — SYRINGE AND NEEDLE,INSULIN,1ML 31GX15/64"
SYRINGE, EMPTY DISPOSABLE MISCELLANEOUS
Qty: 50 PEN NEEDLE | Refills: 3 | Status: CANCELLED | OUTPATIENT
Start: 2022-08-30

## 2022-08-30 RX ORDER — SYRINGE-NEEDLE,INSULIN,0.5 ML 27GX1/2"
SYRINGE, EMPTY DISPOSABLE MISCELLANEOUS
Qty: 6 EACH | Refills: 3 | Status: SHIPPED | OUTPATIENT
Start: 2022-08-30

## 2022-08-30 RX ORDER — SYRINGE, DISPOSABLE, 1 ML
SYRINGE, EMPTY DISPOSABLE MISCELLANEOUS
Status: CANCELLED | OUTPATIENT
Start: 2022-08-30

## 2022-08-30 NOTE — PROGRESS NOTES
Stephan Calle is a 67 y.o. female and presents with Diabetes and Hypertension  . Subjective:    .she has a history of b12 deficiency  She states she needs needles    Carpal Tunnel Syndrome Review:  Patient presents for evaluation of pain in hand(s), hand paresthesias of the first two fingers. Onset of the symptoms was several months ago. Current symptoms include tingling and weakness involving the medial aspect of the bilateral hand(s): severity: moderate. Patient's overall course: gradually worsening. Patient has had no prior elbow problems. Previous visits for this problem: none. Evaluation to date: noted  Treatment to date: none. Hypertension Review:  The patient has hypertension . Diet and Lifestyle: generally follows a low sodium diet, exercises sporadically  Home BP Monitoring: is not measured at home. Pertinent ROS: taking medications as instructed, no medication side effects noted, no TIA's, no chest pain on exertion, no dyspnea on exertion, she has lower leg swelling of ankles. Dyslipidemia Review:  Patient presents for evaluation of lipids. Compliance with treatment thus far has been excellent. A repeat fasting lipid profile was done. The patient does not use medications that may worsen dyslipidemias . The patient exercises sporadically. She remains on Meloxacan      Anemia review:  Patient presents for follow up  evaluation of an anemia. Anemia was found by routine CBC. It had been present for several months. Associated signs & symptoms:none  The  most recent studies were improved      Review of Systems  Constitutional: negative for fevers, chills, anorexia and weight loss  Eyes:   negative for visual disturbance and irritation  ENT:   negative for tinnitus,sore throat,nasal congestion,ear pains. hoarseness  Respiratory:  negative for cough, hemoptysis, dyspnea,wheezing  CV:   negative for chest pain, palpitations, lower extremity edema  GI:   negative for nausea, vomiting, diarrhea, abdominal pain,melena  Endo:               negative for polyuria,polydipsia,polyphagia,heat intolerance  Genitourinary: negative for frequency, dysuria and hematuria  Integument:  negative for rash and pruritus  Hematologic:  negative for easy bruising and gum/nose bleeding  Musculoskel:  arthralgias, back pain, , joint pain  Neurological:   gait problems  Behavl/Psych: negative for feelings of anxiety, depression, mood changes    Past Medical History:   Diagnosis Date    Arthritis     Diabetes ([address]     NIDDM    Fracture     HTN (hypertension)     Hyperlipemia     Menopause      Past Surgical History:   Procedure Laterality Date    HX CATARACT REMOVAL Bilateral 2012, 2017    HX CHOLECYSTECTOMY  2015    HX COLONOSCOPY      HX ENDOSCOPY      HX FRACTURE TX Left     HX ORTHOPAEDIC  1969    lumbar discectomy      Social History     Socioeconomic History    Marital status:    Tobacco Use    Smoking status: Never    Smokeless tobacco: Never   Vaping Use    Vaping Use: Never used   Substance and Sexual Activity    Alcohol use: No    Drug use: No     Family History   Problem Relation Age of Onset    Diabetes Father     Kidney Disease Father     Diabetes Brother     Cancer Brother     Stroke Mother     Kidney Disease Brother     Cancer Brother     Other Brother         kidney stones    Cancer Sister     Ovarian Cancer Sister     Cancer Sister         Neck/Head? Heart Disease Brother     Anesth Problems Neg Hx      Current Outpatient Medications   Medication Sig Dispense Refill    ReliOn Prime Test Strips strip USE TO CHECK BLOOD SUGAR ONCE DAILY 100 Strip 3    lancets (ReliOn Ultra Thin Plus Lancets) misc TEST BLOOD SUGAR ONCE DAILY E11.9/ DIABETES 50 Each 11    Insulin Syringe-Needle U-100 1 mL 30 gauge x 1/2\" syrg Use with b12 twice a month 6 Each 3    losartan (COZAAR) 50 mg tablet Take 1 Tablet by mouth in the morning.  90 Tablet 3    meloxicam (MOBIC) 15 mg tablet Take 1 Tablet by mouth in the morning. Indications: joint damage causing pain and loss of function 90 Tablet 3    cyanocobalamin (VITAMIN B12) 1,000 mcg/mL injection 1 mL by IntraMUSCular route Once every 2 weeks. 1 mL 0    metoprolol tartrate (LOPRESSOR) 100 mg IR tablet TAKE 1 TABLET BY MOUTH TWICE DAILY 60 Tablet 2    latanoprost (XALATAN) 0.005 % ophthalmic solution       amLODIPine (NORVASC) 5 mg tablet Take 1 Tablet by mouth daily. 90 Tablet 3    dorzolamide HCl/timolol maleat (DORZOLAMIDE-TIMOLOL OP) Administer 1 Drop to both eyes two (2) times a day. metFORMIN (GLUCOPHAGE) 1,000 mg tablet Take 1 Tablet by mouth two (2) times a day. 180 Tablet 3    cloNIDine HCL (CATAPRES) 0.1 mg tablet Take 1 Tablet by mouth two (2) times a day. Take  by mouth two (2) times a day. 180 Tablet 3    ferrous sulfate 325 mg (65 mg iron) tablet Take 1 Tablet by mouth daily. 30 Tablet 3    glipiZIDE SR (GLUCOTROL XL) 5 mg CR tablet Take 1 Tablet by mouth daily. 90 Tablet 3    pravastatin (PRAVACHOL) 40 mg tablet Take 1 Tablet by mouth daily. 90 Tablet 3    ReliOn Prime Meter misc USE TO CHECK BLOOD SUGAR ONCE DAILY      latanoprost (XALATAN) 0.005 % ophthalmic solution Administer 1 Drop to both eyes nightly. aspirin 81 mg chewable tablet Take 81 mg by mouth every other day. potassium chloride SR (K-TAB) 20 mEq tablet Take 40 mEq by mouth two (2) times a day. (Patient not taking: Reported on 8/30/2022)      magnesium oxide (MAG-OX) 400 mg tablet Take 400 mg by mouth daily. (Patient not taking: No sig reported)      docusate sodium (COLACE) 100 mg capsule Take 100 mg by mouth two (2) times a day.  (Patient not taking: Reported on 8/30/2022)       Allergies   Allergen Reactions    Enalapril Swelling       Objective:  Visit Vitals  /88 (BP 1 Location: Right arm, BP Patient Position: Sitting, BP Cuff Size: Adult)   Pulse 75   Temp 97.9 °F (36.6 °C) (Oral)   Resp 19   Ht 5' 4\" (1.626 m)   Wt 172 lb (78 kg)   SpO2 95%   BMI 29.52 kg/m²     Physical Exam:   General appearance - alert, well appearing, and in no distress  Mental status - alert, oriented to person, place, and time  EYE-CASEY, EOMI, corneas normal, no foreign bodies  ENT-ENT exam normal, no neck nodes or sinus tenderness  Nose - normal and patent, no erythema, discharge or polyps  Mouth - mucous membranes moist, pharynx normal without lesions  Neck - supple, no significant adenopathy   Chest - clear to auscultation, no wheezes, rales or rhonchi, symmetric air entry   Heart - normal rate, regular rhythm, normal S1, S2, no murmurs, rubs, clicks or gallops   Abdomen - soft, nontender, nondistended, no masses or organomegaly  Lymph- no adenopathy palpable  Ext-peripheral pulses normal, 3+bilateral leg edema edema, no clubbing or cyanosis  Skin-Warm and dry. no hyperpigmentation, vitiligo, or suspicious lesions  Neuro -alert, oriented, normal speech, no focal findings or movement disorder noted  Neck-normal C-spine, no tenderness, full ROM without pain  Feet-no nail deformities or callus formation with good pulses noted  Lt.hand-positive phalen sign    Results for orders placed or performed in visit on 17/60/62   METABOLIC PANEL, COMPREHENSIVE   Result Value Ref Range    Sodium 141 136 - 145 mmol/L    Potassium 4.8 3.5 - 5.1 mmol/L    Chloride 109 (H) 97 - 108 mmol/L    CO2 26 21 - 32 mmol/L    Anion gap 6 5 - 15 mmol/L    Glucose 114 (H) 65 - 100 mg/dL    BUN 30 (H) 6 - 20 MG/DL    Creatinine 1.57 (H) 0.55 - 1.02 MG/DL    BUN/Creatinine ratio 19 12 - 20      GFR est AA 39 (L) >60 ml/min/1.73m2    GFR est non-AA 32 (L) >60 ml/min/1.73m2    Calcium 12.0 (H) 8.5 - 10.1 MG/DL    Bilirubin, total 0.4 0.2 - 1.0 MG/DL    ALT (SGPT) 13 12 - 78 U/L    AST (SGOT) 11 (L) 15 - 37 U/L    Alk.  phosphatase 82 45 - 117 U/L    Protein, total 6.9 6.4 - 8.2 g/dL    Albumin 3.5 3.5 - 5.0 g/dL    Globulin 3.4 2.0 - 4.0 g/dL    A-G Ratio 1.0 (L) 1.1 - 2.2     MAGNESIUM   Result Value Ref Range    Magnesium 1.5 (L) 1.6 - 2.4 mg/dL   VITAMIN B12 & FOLATE   Result Value Ref Range    Vitamin B12 1,092 (H) 193 - 986 pg/mL    Folate 10.1 5.0 - 21.0 ng/mL   CBC W/O DIFF   Result Value Ref Range    WBC 7.4 3.6 - 11.0 K/uL    RBC 4.05 3.80 - 5.20 M/uL    HGB 11.1 (L) 11.5 - 16.0 g/dL    HCT 36.2 35.0 - 47.0 %    MCV 89.4 80.0 - 99.0 FL    MCH 27.4 26.0 - 34.0 PG    MCHC 30.7 30.0 - 36.5 g/dL    RDW 15.2 (H) 11.5 - 14.5 %    PLATELET 826 398 - 420 K/uL    MPV 11.7 8.9 - 12.9 FL    NRBC 0.0 0  WBC    ABSOLUTE NRBC 0.00 0.00 - 0.01 K/uL   SAMPLES BEING HELD   Result Value Ref Range    SAMPLES BEING HELD 1SST     COMMENT        Add-on orders for these samples will be processed based on acceptable specimen integrity and analyte stability, which may vary by analyte. Assessment/Plan:    ICD-10-CM ICD-9-CM    1. Primary hyperparathyroidism (HCC)  E21.0 252.01       2. Type 2 diabetes mellitus with stage 2 chronic kidney disease, without long-term current use of insulin (HCC)  E11.22 250.40 AMB POC GLUCOSE BLOOD, BY GLUCOSE MONITORING DEVICE    N18.2 585.2 AMB POC HEMOGLOBIN A1C      3. Essential hypertension  I10 401.9       4. Hypercholesteremia  E78.00 272.0       5. B12 deficiency  E53.8 266.2             Orders Placed This Encounter    AMB POC GLUCOSE BLOOD, BY GLUCOSE MONITORING DEVICE    AMB POC HEMOGLOBIN A1C    ReliOn Prime Test Strips strip     Sig: USE TO CHECK BLOOD SUGAR ONCE DAILY     Dispense:  100 Strip     Refill:  3    lancets (ReliOn Ultra Thin Plus Lancets) misc     Sig: TEST BLOOD SUGAR ONCE DAILY E11.9/ DIABETES     Dispense:  50 Each     Refill:  11    Insulin Syringe-Needle U-100 1 mL 30 gauge x 1/2\" syrg     Sig: Use with b12 twice a month     Dispense:  6 Each     Refill:  3       routine labs ordered,Take 81mg aspirin daily    Indications:   Symptom relief from osteoarthritis    Procedure:  After consent was obtained, using sterile technique the  hand carpal tunnel sheath  using  alcohol .  Local anesthetic used: 1% lidocaine. .Kenalog 40 mg was mixed with 1% lidocaine 2 ml  and injected into the sheath and the needle withdrawn. The procedure was well tolerated. The patient is asked to continue to rest the joint for a few more days before resuming regular activities. It may be more painful for the first 1-2 days. Watch for fever, or increased swelling or persistent pain in the joint. Call or return to clinic prn if such symptoms occur or there is failure to improve as anticipated. Patient Instructions   friendfundhart Activation    Thank you for requesting access to Pounce. Please follow the instructions below to securely access and download your online medical record. Pounce allows you to send messages to your doctor, view your test results, renew your prescriptions, schedule appointments, and more. How Do I Sign Up? In your internet browser, go to [email or web address]  Click on the First Time User? Click Here link in the Sign In box. You will be redirect to the New Member Sign Up page. Enter your Pounce Access Code exactly as it appears below. You will not need to use this code after youve completed the sign-up process. If you do not sign up before the expiration date, you must request a new code. Pounce Access Code: [email or web address]  Expires: 10/14/2022  9:55 AM (This is the date your Pounce access code will )    Enter the last four digits of your Social Security Number (xxxx) and Date of Birth (mm/dd/yyyy) as indicated and click Submit. You will be taken to the next sign-up page. Create a Pounce ID. This will be your Pounce login ID and cannot be changed, so think of one that is secure and easy to remember. Create a Pounce password. You can change your password at any time. Enter your Password Reset Question and Answer. This can be used at a later time if you forget your password. Enter your e-mail address.  You will receive e-mail notification when new information is available in Delivery Agent. Click Sign Up. You can now view and download portions of your medical record. Click the [employer] link to download a portable copy of your medical information. Additional Information    If you have questions, please visit the Frequently Asked Questions section of the Delivery Agent website at [email or web address] Remember, Delivery Agent is NOT to be used for urgent needs. For medical emergencies, dial 911. Follow-up and Dispositions    Return in about 3 months (around 11/30/2022), or if symptoms worsen or fail to improve. I have reviewed with the patient details of the assessment and plan and all questions were answered. Relevent patient education was performed. The most recent lab findings were reviewed with the patient. An After Visit Summary was printed and given to the patient.     \

## 2022-08-30 NOTE — PATIENT INSTRUCTIONS
Kudo Activation    Thank you for requesting access to Kudo. Please follow the instructions below to securely access and download your online medical record. Kudo allows you to send messages to your doctor, view your test results, renew your prescriptions, schedule appointments, and more. How Do I Sign Up? In your internet browser, go to [email or web address]  Click on the First Time User? Click Here link in the Sign In box. You will be redirect to the New Member Sign Up page. Enter your Kudo Access Code exactly as it appears below. You will not need to use this code after youve completed the sign-up process. If you do not sign up before the expiration date, you must request a new code. Kudo Access Code: [email or web address]  Expires: 10/14/2022  9:55 AM (This is the date your Kudo access code will )    Enter the last four digits of your Social Security Number (xxxx) and Date of Birth (mm/dd/yyyy) as indicated and click Submit. You will be taken to the next sign-up page. Create a Kudo ID. This will be your Kudo login ID and cannot be changed, so think of one that is secure and easy to remember. Create a Kudo password. You can change your password at any time. Enter your Password Reset Question and Answer. This can be used at a later time if you forget your password. Enter your e-mail address. You will receive e-mail notification when new information is available in [address] Click Sign Up. You can now view and download portions of your medical record. Click the [place] link to download a portable copy of your medical information. Additional Information    If you have questions, please visit the Frequently Asked Questions section of the Kudo website at [email or web address] Remember, Kudo is NOT to be used for urgent needs. For medical emergencies, dial 911.

## 2022-09-15 RX ORDER — PRAVASTATIN SODIUM 40 MG/1
TABLET ORAL
Qty: 90 TABLET | Refills: 3 | Status: SHIPPED | OUTPATIENT
Start: 2022-09-15

## 2022-09-20 ENCOUNTER — TELEPHONE (OUTPATIENT)
Dept: INTERNAL MEDICINE CLINIC | Age: 72
End: 2022-09-20

## 2022-09-20 RX ORDER — INSULIN PUMP SYRINGE, 3 ML
EACH MISCELLANEOUS
Qty: 1 KIT | Refills: 0 | Status: CANCELLED | OUTPATIENT
Start: 2022-09-20

## 2022-09-20 RX ORDER — LANCETS
EACH MISCELLANEOUS
Qty: 1 EACH | Refills: 0 | Status: CANCELLED | OUTPATIENT
Start: 2022-09-20

## 2022-09-20 NOTE — TELEPHONE ENCOUNTER
Please review and prescribe if appropriate. Thank you.     ----------------------------------------------------  Pt son Diane Ball) left a voice message requesting a new prescriptions for the One Touch Meter, Test Strips and Lancets to be sent to the [facility]. The pharmacy does not stock the Reli-On ([employer] brand). Pt son stated per insurance they could no longer use the [address].      [phone]      Last visit 08/30/2022 MD Tammy Joseph   Next appointment 11/30/2022  [address] Only    Intervention Detail: New Rx: 3, reason: Patient Preference  Time Spent (min): 5      Requested Prescriptions     Pending Prescriptions Disp Refills    glucose blood VI test strips strip 100 Strip 0     Sig: USE TO CHECK BLOOD SUGAR ONCE DAILY E11.9/ DIABETES    lancets (OneTouch UltraSoft Lancets) misc 1 Each 0     Sig: TEST BLOOD SUGAR ONCE DAILY E11.9/ DIABETES    Blood-Glucose Meter monitoring kit 1 Kit 0     Sig: TEST BLOOD SUGAR ONCE DAILY E11.9/ DIABETES

## 2022-10-11 RX ORDER — METOPROLOL TARTRATE 100 MG/1
TABLET ORAL
Qty: 60 TABLET | Refills: 2 | Status: SHIPPED | OUTPATIENT
Start: 2022-10-11

## 2022-10-26 RX ORDER — FERROUS SULFATE TAB 325 MG (65 MG ELEMENTAL FE) 325 (65 FE) MG
TAB ORAL
Qty: 30 TABLET | Refills: 3 | Status: SHIPPED | OUTPATIENT
Start: 2022-10-26

## 2022-11-07 ENCOUNTER — CLINICAL SUPPORT (OUTPATIENT)
Dept: INTERNAL MEDICINE CLINIC | Age: 72
End: 2022-11-07
Payer: MEDICARE

## 2022-11-07 DIAGNOSIS — Z23 NEEDS FLU SHOT: Primary | ICD-10-CM

## 2022-11-07 PROCEDURE — 90694 VACC AIIV4 NO PRSRV 0.5ML IM: CPT | Performed by: INTERNAL MEDICINE

## 2022-11-07 PROCEDURE — G0008 ADMIN INFLUENZA VIRUS VAC: HCPCS | Performed by: INTERNAL MEDICINE

## 2022-11-07 NOTE — PROGRESS NOTES
INFLUENZA VACCINATION ADMINISTERED  & SIGNED PER DR. GRAVES, VERBAL ORDER. Bouchra West is a 67 y.o. female  who presents for routine immunizations. He denies any symptoms , reactions or allergies that would exclude them from being immunized today. Risks and adverse reactions were discussed and the VIS was given to them. All questions were addressed. He was observed for 5 min post injection. There were no reactions observed.     Ashanti Carmen LPN

## 2023-01-15 RX ORDER — METOPROLOL TARTRATE 100 MG/1
TABLET ORAL
Qty: 60 TABLET | Refills: 2 | Status: SHIPPED | OUTPATIENT
Start: 2023-01-15

## 2023-01-15 RX ORDER — GLIPIZIDE 5 MG/1
5 TABLET, FILM COATED, EXTENDED RELEASE ORAL DAILY
Qty: 90 TABLET | Refills: 3 | Status: SHIPPED | OUTPATIENT
Start: 2023-01-15

## 2023-02-06 RX ORDER — CLONIDINE HYDROCHLORIDE 0.1 MG/1
TABLET ORAL
Qty: 180 TABLET | Refills: 3 | Status: SHIPPED | OUTPATIENT
Start: 2023-02-06

## 2023-02-07 ENCOUNTER — OFFICE VISIT (OUTPATIENT)
Dept: INTERNAL MEDICINE CLINIC | Age: 73
End: 2023-02-07
Payer: MEDICARE

## 2023-02-07 VITALS
BODY MASS INDEX: 31.07 KG/M2 | HEART RATE: 75 BPM | SYSTOLIC BLOOD PRESSURE: 160 MMHG | TEMPERATURE: 98 F | DIASTOLIC BLOOD PRESSURE: 80 MMHG | OXYGEN SATURATION: 100 % | RESPIRATION RATE: 16 BRPM | HEIGHT: 64 IN | WEIGHT: 182 LBS

## 2023-02-07 DIAGNOSIS — N18.2 TYPE 2 DIABETES MELLITUS WITH STAGE 2 CHRONIC KIDNEY DISEASE, WITHOUT LONG-TERM CURRENT USE OF INSULIN (HCC): ICD-10-CM

## 2023-02-07 DIAGNOSIS — Z12.11 SCREENING FOR MALIGNANT NEOPLASM OF COLON: ICD-10-CM

## 2023-02-07 DIAGNOSIS — E21.0 PRIMARY HYPERPARATHYROIDISM (HCC): ICD-10-CM

## 2023-02-07 DIAGNOSIS — Z11.59 NEED FOR HEPATITIS C SCREENING TEST: ICD-10-CM

## 2023-02-07 DIAGNOSIS — E11.22 TYPE 2 DIABETES MELLITUS WITH STAGE 2 CHRONIC KIDNEY DISEASE, WITHOUT LONG-TERM CURRENT USE OF INSULIN (HCC): ICD-10-CM

## 2023-02-07 DIAGNOSIS — R60.0 LOCALIZED EDEMA: ICD-10-CM

## 2023-02-07 DIAGNOSIS — I10 ESSENTIAL HYPERTENSION: Primary | ICD-10-CM

## 2023-02-07 DIAGNOSIS — E53.8 B12 DEFICIENCY: ICD-10-CM

## 2023-02-07 DIAGNOSIS — E78.00 HYPERCHOLESTEREMIA: ICD-10-CM

## 2023-02-07 LAB
CHOLEST SERPL-MCNC: 151 MG/DL
GLUCOSE POC: 119 MG/DL
HBA1C MFR BLD HPLC: 6.7 %
HDLC SERPL-MCNC: 57 MG/DL
LDL CHOLESTEROL POC: 75 MG/DL
NON-HDL GOAL (POC): 94
TCHOL/HDL RATIO (POC): 2.6
TRIGL SERPL-MCNC: 92 MG/DL

## 2023-02-07 PROCEDURE — G8510 SCR DEP NEG, NO PLAN REQD: HCPCS | Performed by: INTERNAL MEDICINE

## 2023-02-07 PROCEDURE — G9899 SCRN MAM PERF RSLTS DOC: HCPCS | Performed by: INTERNAL MEDICINE

## 2023-02-07 PROCEDURE — 83036 HEMOGLOBIN GLYCOSYLATED A1C: CPT | Performed by: INTERNAL MEDICINE

## 2023-02-07 PROCEDURE — 2022F DILAT RTA XM EVC RTNOPTHY: CPT | Performed by: INTERNAL MEDICINE

## 2023-02-07 PROCEDURE — 82962 GLUCOSE BLOOD TEST: CPT | Performed by: INTERNAL MEDICINE

## 2023-02-07 PROCEDURE — 3017F COLORECTAL CA SCREEN DOC REV: CPT | Performed by: INTERNAL MEDICINE

## 2023-02-07 PROCEDURE — G8399 PT W/DXA RESULTS DOCUMENT: HCPCS | Performed by: INTERNAL MEDICINE

## 2023-02-07 PROCEDURE — 1090F PRES/ABSN URINE INCON ASSESS: CPT | Performed by: INTERNAL MEDICINE

## 2023-02-07 PROCEDURE — 99214 OFFICE O/P EST MOD 30 MIN: CPT | Performed by: INTERNAL MEDICINE

## 2023-02-07 PROCEDURE — G8417 CALC BMI ABV UP PARAM F/U: HCPCS | Performed by: INTERNAL MEDICINE

## 2023-02-07 PROCEDURE — 3046F HEMOGLOBIN A1C LEVEL >9.0%: CPT | Performed by: INTERNAL MEDICINE

## 2023-02-07 PROCEDURE — G8536 NO DOC ELDER MAL SCRN: HCPCS | Performed by: INTERNAL MEDICINE

## 2023-02-07 PROCEDURE — 1101F PT FALLS ASSESS-DOCD LE1/YR: CPT | Performed by: INTERNAL MEDICINE

## 2023-02-07 PROCEDURE — G8427 DOCREV CUR MEDS BY ELIG CLIN: HCPCS | Performed by: INTERNAL MEDICINE

## 2023-02-07 PROCEDURE — 80061 LIPID PANEL: CPT | Performed by: INTERNAL MEDICINE

## 2023-02-07 RX ORDER — HYDROCHLOROTHIAZIDE 25 MG/1
25 TABLET ORAL DAILY
Qty: 30 TABLET | Refills: 12 | Status: SHIPPED | OUTPATIENT
Start: 2023-02-07

## 2023-02-07 RX ORDER — CYANOCOBALAMIN 1000 UG/ML
1000 INJECTION, SOLUTION INTRAMUSCULAR; SUBCUTANEOUS EVERY 2 WEEKS
Qty: 1 ML | Refills: 0
Start: 2023-02-07

## 2023-02-07 RX ORDER — CYANOCOBALAMIN 1000 UG/ML
1000 INJECTION, SOLUTION INTRAMUSCULAR; SUBCUTANEOUS EVERY 2 WEEKS
Qty: 1 ML | Refills: 0
Start: 2023-02-07 | End: 2023-02-07 | Stop reason: SDUPTHER

## 2023-02-07 RX ORDER — LANOLIN ALCOHOL/MO/W.PET/CERES
325 CREAM (GRAM) TOPICAL DAILY
Qty: 30 TABLET | Refills: 3 | Status: SHIPPED | OUTPATIENT
Start: 2023-02-07

## 2023-02-07 NOTE — PROGRESS NOTES
Chief Complaint   Patient presents with    Cholesterol Problem    Diabetes    Leg Swelling     3 most recent PHQ Screens 2/7/2023   PHQ Not Done -   Little interest or pleasure in doing things Not at all   Feeling down, depressed, irritable, or hopeless Not at all   Total Score PHQ 2 0     1. Have you been to the ER, urgent care clinic since your last visit? Hospitalized since your last visit?no    2. Have you seen or consulted any other health care providers outside of the [address] [place] [address] since your last visit? Include any pap smears or colon screening.  no

## 2023-02-07 NOTE — PROGRESS NOTES
Liu Aldridge is a 67 y.o. female and presents with Cholesterol Problem, Diabetes, and Leg Swelling  . Subjective:  Hypertension Review:  The patient has hypertension . Diet and Lifestyle: generally follows a low sodium diet, exercises sporadically  Home BP Monitoring: is not measured at home. Pertinent ROS: taking medications as instructed, no medication side effects noted, no TIA's, no chest pain on exertion, no dyspnea on exertion, she has  swelling of ankles and legs reported. Dyslipidemia Review:  Patient presents for evaluation of lipids. Compliance with treatment thus far has been excellent. A repeat fasting lipid profile was not done. The patient does not use medications that may worsen dyslipidemias . The patient exercises sporadically. Diabetes Mellitus Review:  She has diabetes mellitus. Diabetic ROS - medication compliance: compliant all of the time, diabetic diet compliance: compliant all of the time, home glucose monitoring: is performed. Known diabetic complications: none  Cardiovascular risk factors: family history, dyslipidemia, diabetes mellitus, obesity, hypertension  Current diabetic medications include oral agents/insulin   Eye exam current (within one year): no  Weight trend: stable  Prior visit with dietician: no  Current diet: \"healthy\" diet  in general  Current exercise: walking  Current monitoring regimen: home blood tests - daily  Home blood sugar records: trend: stable  Any episodes of hypoglycemia? no  Lab review: orders written for new lab studies as appropriate; see orders. Liu Aldridge is a 67 y.o. female and presents for annual Medicare Wellness Visit. Problem List: Reviewed with patient and discussed risk factors.     Patient Active Problem List   Diagnosis Code    Primary hyperparathyroidism ([facility] [address] E21.0    Type 2 diabetes mellitus E11.9       Current medical providers:  Patient Care Team:  Barry Balderas MD as PCP - General (Internal Medicine Physician)  Jessica Jerez MD as PCP - [facility]  Quan Walters MD as Surgeon (General Surgery)  Dioni Carcamo MD as Consulting Provider (Endocrinology Physician)    PSH: Reviewed with patient  Past Surgical History:   Procedure Laterality Date    HX CATARACT REMOVAL Bilateral 2012, 2017    HX CHOLECYSTECTOMY  2015    HX COLONOSCOPY      HX ENDOSCOPY      HX FRACTURE TX Left     HX ORTHOPAEDIC  1969    lumbar discectomy         SH: Reviewed with patient  Social History     Tobacco Use    Smoking status: Never    Smokeless tobacco: Never   Vaping Use    Vaping Use: Never used   Substance Use Topics    Alcohol use: No    Drug use: No       FH: Reviewed with patient  Family History   Problem Relation Age of Onset    Diabetes Father     Kidney Disease Father     Diabetes Brother     Cancer Brother     Stroke Mother     Kidney Disease Brother     Cancer Brother     Other Brother         kidney stones    Cancer Sister     Ovarian Cancer Sister     Cancer Sister         Neck/Head? Heart Disease Brother     Anesth Problems Neg Hx        Medications/Allergies: Reviewed with patient  Current Outpatient Medications on File Prior to Visit   Medication Sig Dispense Refill    cloNIDine HCL (CATAPRES) 0.1 mg tablet TAKE 1 TABLET BY MOUTH TWICE DAILY 180 Tablet 3    metoprolol tartrate (LOPRESSOR) 100 mg IR tablet TAKE 1 TABLET BY MOUTH TWICE DAILY 60 Tablet 2    glipiZIDE SR (GLUCOTROL XL) 5 mg CR tablet TAKE 1 TABLET BY MOUTH DAILY 90 Tablet 3    pravastatin (PRAVACHOL) 40 mg tablet TAKE 1 TABLET BY MOUTH EVERY DAY 90 Tablet 3    ReliOn Prime Test Strips strip USE TO CHECK BLOOD SUGAR ONCE DAILY 100 Strip 3    lancets (ReliOn Ultra Thin Plus Lancets) misc TEST BLOOD SUGAR ONCE DAILY E11.9/ DIABETES 50 Each 11    Insulin Syringe-Needle U-100 1 mL 30 gauge x 1/2\" syrg Use with b12 twice a month 6 Each 3    losartan (COZAAR) 50 mg tablet Take 1 Tablet by mouth in the morning.  90 Tablet 3 meloxicam (MOBIC) 15 mg tablet Take 1 Tablet by mouth in the morning. Indications: joint damage causing pain and loss of function 90 Tablet 3    latanoprost (XALATAN) 0.005 % ophthalmic solution       amLODIPine (NORVASC) 5 mg tablet Take 1 Tablet by mouth daily. 90 Tablet 3    dorzolamide HCl/timolol maleat (DORZOLAMIDE-TIMOLOL OP) Administer 1 Drop to both eyes two (2) times a day. metFORMIN (GLUCOPHAGE) 1,000 mg tablet Take 1 Tablet by mouth two (2) times a day. 180 Tablet 3    ReliOn Prime Meter misc USE TO CHECK BLOOD SUGAR ONCE DAILY      latanoprost (XALATAN) 0.005 % ophthalmic solution Administer 1 Drop to both eyes nightly. aspirin 81 mg chewable tablet Take 81 mg by mouth every other day. potassium chloride SR (K-TAB) 20 mEq tablet Take 40 mEq by mouth two (2) times a day. (Patient not taking: Reported on 8/30/2022)      magnesium oxide (MAG-OX) 400 mg tablet Take 400 mg by mouth daily. (Patient not taking: No sig reported)      docusate sodium (COLACE) 100 mg capsule Take 100 mg by mouth two (2) times a day. (Patient not taking: No sig reported)       No current facility-administered medications on file prior to visit. Allergies   Allergen Reactions    Enalapril Swelling       Objective:  Visit Vitals  BP (!) 160/80 (BP 1 Location: Right arm, BP Patient Position: Sitting, BP Cuff Size: Adult)   Pulse 75   Temp 98 °F (36.7 °C) (Oral)   Resp 16   Ht 5' 4\" (1.626 m)   Wt 182 lb (82.6 kg)   SpO2 100%   BMI 31.24 kg/m²    Body mass index is 31.24 kg/m².     Assessment of cognitive impairment: Alert and oriented x 3    Depression Screen:   3 most recent PHQ Screens 2/7/2023   PHQ Not Done -   Little interest or pleasure in doing things Not at all   Feeling down, depressed, irritable, or hopeless Not at all   Total Score PHQ 2 0     Depression Review:  Patient is seen for screen of depression,denies anhedonia, weight gain, insomnia, hypersomnia, psychomotor agitation, psychomotor retardation, fatigue, feelings of worthlessness/guilt, difficulty concentrating, hopelessness, impaired memory and recurrent thoughts of death Treatment includes no medication   The denies recurrent thoughts of death and suicidal thoughts without plan. Fall Risk Assessment:    Fall Risk Assessment, last 12 mths 8/30/2022   Able to walk? Yes   Fall in past 12 months? 0   Do you feel unsteady? 0   Are you worried about falling 0       Functional Ability:   Does the patient exhibit a steady gait? yes   How long did it take the patient to get up and walk from a sitting position? seconds   Is the patient self reliant?  (ie can do own laundry, meals, household chores)  yes     Does the patient handle his/her own medications? yes     Does the patient handle his/her own money? yes     Is the patients home safe (ie good lighting, handrails on stairs and bath, etc.)? yes     Did you notice or did patient express any hearing difficulties? no     Did you notice or did patient express any vision difficulties?   no     Were distance and reading eye charts used? no       Advance Care Planning:   Patient was offered the opportunity to discuss advance care planning:  yes     Does patient have an Advance Directive:  no   If no, did you provide information on [facility] Connections?   yes       Plan:      Orders Placed This Encounter    METABOLIC PANEL, COMPREHENSIVE    CBC W/O DIFF    OCCULT BLOOD IMMUNOASSAY,DIAGNOSTIC    HEPATITIS C AB    AMB POC GLUCOSE BLOOD, BY GLUCOSE MONITORING DEVICE    AMB POC HEMOGLOBIN A1C    AMB POC LIPID PROFILE    ferrous sulfate (FeroSuL) 325 mg (65 mg iron) tablet    cyanocobalamin (VITAMIN B12) 1,000 mcg/mL injection    hydroCHLOROthiazide (HYDRODIURIL) 25 mg tablet       Health Maintenance   Topic Date Due    Hepatitis C Screening  Never done    Eye Exam Retinal or Dilated  Never done    Diabetic Alb to Cr ratio (uACR) test  Never done    Shingles Vaccine (1 of 2) Never done    Colorectal Cancer Screening Combo  02/18/2020    COVID-19 Vaccine (4 - Booster for Pfizer series) 12/18/2021    Lipid Screen  03/30/2023    Foot Exam Q1  08/02/2023    GFR test (Diabetes, CKD 3-4, OR last GFR 15-59)  08/30/2023    Depression Screen  08/30/2023    A1C test (Diabetic or Prediabetic)  08/30/2023    Breast Cancer Screen Mammogram  01/19/2024    Medicare Yearly Exam  02/08/2024    DTaP/Tdap/Td series (2 - Td or Tdap) 04/02/2025    Bone Densitometry (Dexa) Screening  Completed    Flu Vaccine  Completed    Pneumococcal 65+ years  Completed       *Patient verbalized understanding and agreement with the plan. A copy of the After Visit Summary with personalized health plan was given to the patient today. Review of Systems  Constitutional: negative for fevers, chills, anorexia and weight loss  Eyes:   negative for visual disturbance and irritation  ENT:   negative for tinnitus,sore throat,nasal congestion,ear pains. hoarseness  Respiratory:  negative for cough, hemoptysis, dyspnea,wheezing  CV:   negative for chest pain, palpitations, lower extremity edema  GI:   negative for nausea, vomiting, diarrhea, abdominal pain,melena  Endo:               negative for polyuria,polydipsia,polyphagia,heat intolerance  Genitourinary: negative for frequency, dysuria and hematuria  Integument:  negative for rash and pruritus  Hematologic:  negative for easy bruising and gum/nose bleeding  Musculoskel: negative for myalgias, arthralgias, back pain, muscle weakness, joint pain  Neurological:  negative for headaches, dizziness, vertigo, memory problems and gait   Behavl/Psych: negative for feelings of anxiety, depression, mood changes    Past Medical History:   Diagnosis Date    Arthritis     Diabetes ([facility] [address]     NIDDM    Fracture     HTN (hypertension)     Hyperlipemia     Menopause      Past Surgical History:   Procedure Laterality Date    HX CATARACT REMOVAL Bilateral 2012, 2017    HX CHOLECYSTECTOMY  2015    HX COLONOSCOPY      HX ENDOSCOPY      HX FRACTURE TX Left     HX ORTHOPAEDIC  1969    lumbar discectomy      Social History     Socioeconomic History    Marital status:    Tobacco Use    Smoking status: Never    Smokeless tobacco: Never   Vaping Use    Vaping Use: Never used   Substance and Sexual Activity    Alcohol use: No    Drug use: No     Family History   Problem Relation Age of Onset    Diabetes Father     Kidney Disease Father     Diabetes Brother     Cancer Brother     Stroke Mother     Kidney Disease Brother     Cancer Brother     Other Brother         kidney stones    Cancer Sister     Ovarian Cancer Sister     Cancer Sister         Neck/Head? Heart Disease Brother     Anesth Problems Neg Hx      Current Outpatient Medications   Medication Sig Dispense Refill    ferrous sulfate (FeroSuL) 325 mg (65 mg iron) tablet Take 1 Tablet by mouth daily. 30 Tablet 3    cyanocobalamin (VITAMIN B12) 1,000 mcg/mL injection 1 mL by IntraMUSCular route Once every 2 weeks. 1 mL 0    hydroCHLOROthiazide (HYDRODIURIL) 25 mg tablet Take 1 Tablet by mouth daily. 30 Tablet 12    cloNIDine HCL (CATAPRES) 0.1 mg tablet TAKE 1 TABLET BY MOUTH TWICE DAILY 180 Tablet 3    metoprolol tartrate (LOPRESSOR) 100 mg IR tablet TAKE 1 TABLET BY MOUTH TWICE DAILY 60 Tablet 2    glipiZIDE SR (GLUCOTROL XL) 5 mg CR tablet TAKE 1 TABLET BY MOUTH DAILY 90 Tablet 3    pravastatin (PRAVACHOL) 40 mg tablet TAKE 1 TABLET BY MOUTH EVERY DAY 90 Tablet 3    ReliOn Prime Test Strips strip USE TO CHECK BLOOD SUGAR ONCE DAILY 100 Strip 3    lancets (ReliOn Ultra Thin Plus Lancets) misc TEST BLOOD SUGAR ONCE DAILY E11.9/ DIABETES 50 Each 11    Insulin Syringe-Needle U-100 1 mL 30 gauge x 1/2\" syrg Use with b12 twice a month 6 Each 3    losartan (COZAAR) 50 mg tablet Take 1 Tablet by mouth in the morning. 90 Tablet 3    meloxicam (MOBIC) 15 mg tablet Take 1 Tablet by mouth in the morning.  Indications: joint damage causing pain and loss of function 90 Tablet 3 latanoprost (XALATAN) 0.005 % ophthalmic solution       amLODIPine (NORVASC) 5 mg tablet Take 1 Tablet by mouth daily. 90 Tablet 3    dorzolamide HCl/timolol maleat (DORZOLAMIDE-TIMOLOL OP) Administer 1 Drop to both eyes two (2) times a day. metFORMIN (GLUCOPHAGE) 1,000 mg tablet Take 1 Tablet by mouth two (2) times a day. 180 Tablet 3    ReliOn Prime Meter misc USE TO CHECK BLOOD SUGAR ONCE DAILY      latanoprost (XALATAN) 0.005 % ophthalmic solution Administer 1 Drop to both eyes nightly. aspirin 81 mg chewable tablet Take 81 mg by mouth every other day. potassium chloride SR (K-TAB) 20 mEq tablet Take 40 mEq by mouth two (2) times a day. (Patient not taking: Reported on 8/30/2022)      magnesium oxide (MAG-OX) 400 mg tablet Take 400 mg by mouth daily. (Patient not taking: No sig reported)      docusate sodium (COLACE) 100 mg capsule Take 100 mg by mouth two (2) times a day.  (Patient not taking: No sig reported)       Allergies   Allergen Reactions    Enalapril Swelling       Objective:  Visit Vitals  BP (!) 160/80 (BP 1 Location: Right arm, BP Patient Position: Sitting, BP Cuff Size: Adult)   Pulse 75   Temp 98 °F (36.7 °C) (Oral)   Resp 16   Ht 5' 4\" (1.626 m)   Wt 182 lb (82.6 kg)   SpO2 100%   BMI 31.24 kg/m²     Physical Exam:   General appearance - alert, well appearing, and in no distress  Mental status - alert, oriented to person, place, and time  EYE-CASEY, EOMI, corneas normal, no foreign bodies  ENT-ENT exam normal, no neck nodes or sinus tenderness  Nose - normal and patent, no erythema, discharge or polyps  Mouth - mucous membranes moist, pharynx normal without lesions  Neck - supple, no significant adenopathy   Chest - clear to auscultation, no wheezes, rales or rhonchi, symmetric air entry   Heart - normal rate, regular rhythm, normal S1, S2, no murmurs, rubs, clicks or gallops   Abdomen - soft, nontender, nondistended, no masses or organomegaly  Lymph- no adenopathy palpable  Ext-peripheral pulses normal, 2+ lower leg edema, no clubbing or cyanosis  Skin-Warm and dry. no hyperpigmentation, vitiligo, or suspicious lesions  Neuro -alert, oriented, normal speech, no focal findings or movement disorder noted  Neck-normal C-spine, no tenderness, full ROM without pain  Feet-no nail deformities or callus formation with good pulses noted      Results for orders placed or performed in visit on 35/02/36   METABOLIC PANEL, COMPREHENSIVE   Result Value Ref Range    Sodium 143 136 - 145 mmol/L    Potassium 5.0 3.5 - 5.1 mmol/L    Chloride 108 97 - 108 mmol/L    CO2 28 21 - 32 mmol/L    Anion gap 7 5 - 15 mmol/L    Glucose 107 (H) 65 - 100 mg/dL    BUN 24 (H) 6 - 20 MG/DL    Creatinine 1.04 (H) 0.55 - 1.02 MG/DL    BUN/Creatinine ratio 23 (H) 12 - 20      GFR est AA >60 >60 ml/min/1.73m2    GFR est non-AA 52 (L) >60 ml/min/1.73m2    Calcium 11.2 (H) 8.5 - 10.1 MG/DL    Bilirubin, total 0.3 0.2 - 1.0 MG/DL    ALT (SGPT) 15 12 - 78 U/L    AST (SGOT) 10 (L) 15 - 37 U/L    Alk. phosphatase 94 45 - 117 U/L    Protein, total 7.2 6.4 - 8.2 g/dL    Albumin 4.0 3.5 - 5.0 g/dL    Globulin 3.2 2.0 - 4.0 g/dL    A-G Ratio 1.3 1.1 - 2.2     CBC W/O DIFF   Result Value Ref Range    WBC 9.8 3.6 - 11.0 K/uL    RBC 4.54 3.80 - 5.20 M/uL    HGB 12.7 11.5 - 16.0 g/dL    HCT 40.3 35.0 - 47.0 %    MCV 88.8 80.0 - 99.0 FL    MCH 28.0 26.0 - 34.0 PG    MCHC 31.5 30.0 - 36.5 g/dL    RDW 15.0 (H) 11.5 - 14.5 %    PLATELET 560 359 - 209 K/uL    MPV 12.0 8.9 - 12.9 FL    NRBC 0.0 0  WBC    ABSOLUTE NRBC 0.00 0.00 - 0.01 K/uL   AMB POC GLUCOSE BLOOD, BY GLUCOSE MONITORING DEVICE   Result Value Ref Range    Glucose  MG/DL   AMB POC HEMOGLOBIN A1C   Result Value Ref Range    Hemoglobin A1c (POC) 6.2 %       Assessment/Plan:    ICD-10-CM ICD-9-CM    1. Essential hypertension  O66 777.7 METABOLIC PANEL, COMPREHENSIVE      CBC W/O DIFF      2.  Type 2 diabetes mellitus with stage 2 chronic kidney disease, without long-term current use of insulin (HCC)  E11.22 250.40 AMB POC GLUCOSE BLOOD, BY GLUCOSE MONITORING DEVICE    N18.2 585.2 AMB POC HEMOGLOBIN A1C      3. Hypercholesteremia  E78.00 272.0 AMB POC LIPID PROFILE      4. B12 deficiency  E53.8 266.2       5. Localized edema  R60.0 782.3       6. Screening for malignant neoplasm of colon  Z12.11 V76.51 OCCULT BLOOD IMMUNOASSAY,DIAGNOSTIC      OCCULT BLOOD IMMUNOASSAY,DIAGNOSTIC      7. Need for hepatitis C screening test  Z11.59 V73.89 HEPATITIS C AB      8. Primary hyperparathyroidism ([facility] [address]  E21.0 252.01         Orders Placed This Encounter    METABOLIC PANEL, COMPREHENSIVE     Standing Status:   Future     Standing Expiration Date:   2024    CBC W/O DIFF     Standing Status:   Future     Standing Expiration Date:   2024    OCCULT BLOOD IMMUNOASSAY,DIAGNOSTIC     Standing Status:   Future     Number of Occurrences:   1     Standing Expiration Date:   2024    HEPATITIS C AB     Standing Status:   Future     Standing Expiration Date:   2024    AMB POC GLUCOSE BLOOD, BY GLUCOSE MONITORING DEVICE    AMB POC HEMOGLOBIN A1C    AMB POC LIPID PROFILE    ferrous sulfate (FeroSuL) 325 mg (65 mg iron) tablet     Sig: Take 1 Tablet by mouth daily. Dispense:  30 Tablet     Refill:  3    cyanocobalamin (VITAMIN B12) 1,000 mcg/mL injection     Si mL by IntraMUSCular route Once every 2 weeks. Dispense:  1 mL     Refill:  0    hydroCHLOROthiazide (HYDRODIURIL) 25 mg tablet     Sig: Take 1 Tablet by mouth daily. Dispense:  30 Tablet     Refill:  12     lose weight, increase physical activity, follow low fat diet, follow low salt diet, continue present plan, call if any problems  Patient Instructions   [facility] Activation    Thank you for requesting access to [facility]. Please follow the instructions below to securely access and download your online medical record.  [facility] allows you to send messages to your doctor, view your test results, renew your prescriptions, schedule appointments, and more. How Do I Sign Up? In your internet browser, go to [email or web address]  Click on the First Time User? Click Here link in the Sign In box. You will be redirect to the New Member Sign Up page. Enter your [employer] Access Code exactly as it appears below. You will not need to use this code after youve completed the sign-up process. If you do not sign up before the expiration date, you must request a new code. [employer] Access Code: [email or web address]  Expires: 3/24/2023 10:25 AM (This is the date your [employer] access code will )    Enter the last four digits of your Social Security Number (xxxx) and Date of Birth (mm/dd/yyyy) as indicated and click Submit. You will be taken to the next sign-up page. Create a [employer] ID. This will be your [employer] login ID and cannot be changed, so think of one that is secure and easy to remember. Create a [employer] password. You can change your password at any time. Enter your Password Reset Question and Answer. This can be used at a later time if you forget your password. Enter your e-mail address. You will receive e-mail notification when new information is available in [address] Click Sign Up. You can now view and download portions of your medical record. Click the [employer] link to download a portable copy of your medical information. Additional Information    If you have questions, please visit the Frequently Asked Questions section of the [employer] website at [email or web address] Remember, [employer] is NOT to be used for urgent needs. For medical emergencies, dial 911. Follow-up and Dispositions    Return in about 4 weeks (around 3/7/2023), or if symptoms worsen or fail to improve. I have reviewed with the patient details of the assessment and plan and all questions were answered.  Relevent patient education was performed    An After Visit Summary was printed and given to the patient.

## 2023-02-07 NOTE — PATIENT INSTRUCTIONS
[facility] Activation    Thank you for requesting access to [facility]. Please follow the instructions below to securely access and download your online medical record. [facility] allows you to send messages to your doctor, view your test results, renew your prescriptions, schedule appointments, and more. How Do I Sign Up? In your internet browser, go to [email or web address]  Click on the First Time User? Click Here link in the Sign In box. You will be redirect to the New Member Sign Up page. Enter your [facility] Access Code exactly as it appears below. You will not need to use this code after youve completed the sign-up process. If you do not sign up before the expiration date, you must request a new code. [facility] Access Code: [email or web address]  Expires: 3/24/2023 10:25 AM (This is the date your [facility] access code will )    Enter the last four digits of your Social Security Number (xxxx) and Date of Birth (mm/dd/yyyy) as indicated and click Submit. You will be taken to the next sign-up page. Create a [facility] ID. This will be your [facility] login ID and cannot be changed, so think of one that is secure and easy to remember. Create a [facility] password. You can change your password at any time. Enter your Password Reset Question and Answer. This can be used at a later time if you forget your password. Enter your e-mail address. You will receive e-mail notification when new information is available in [address] Click Sign Up. You can now view and download portions of your medical record. Click the [place] link to download a portable copy of your medical information. Additional Information    If you have questions, please visit the Frequently Asked Questions section of the [facility] website at [email or web address] Remember, [facility] is NOT to be used for urgent needs. For medical emergencies, dial 911.

## 2023-02-16 RX ORDER — CYANOCOBALAMIN 1000 UG/ML
1000 INJECTION, SOLUTION INTRAMUSCULAR; SUBCUTANEOUS EVERY 2 WEEKS
Qty: 1 ML | Refills: 0 | Status: SHIPPED | OUTPATIENT
Start: 2023-02-16

## 2023-02-16 NOTE — TELEPHONE ENCOUNTER
A voice message requesting a refill was left on behalf of the pt requesting the prescription for Vitamin B12 inj to be sent to the [address] [place] [address] for pt to have for the next dose this weekend. [phone]    Last visit 2023 MD Bernard Barney   Next appointment 2023 MD Bernard Barney   Previous refill encounter(s)   2023 Vitamin B12 #1 ml - was set as a No print - pt is requesting the prescription to be sent to [address] [place] [address]. For Pharmacy Admin Tracking Only    Program: Medication Refill  Intervention Detail: New Rx: 1, reason: Patient Preference  Time Spent (min): 5      Requested Prescriptions     Pending Prescriptions Disp Refills    cyanocobalamin (VITAMIN B12) 1,000 mcg/mL injection 1 mL 0     Si mL by IntraMUSCular route Once every 2 weeks.

## 2023-03-02 RX ORDER — CYANOCOBALAMIN 1000 UG/ML
INJECTION INTRAMUSCULAR; SUBCUTANEOUS
Qty: 1 ML | Refills: 0 | Status: SHIPPED | OUTPATIENT
Start: 2023-03-02

## 2023-03-07 ENCOUNTER — OFFICE VISIT (OUTPATIENT)
Dept: INTERNAL MEDICINE CLINIC | Age: 73
End: 2023-03-07
Payer: MEDICARE

## 2023-03-07 VITALS
HEART RATE: 69 BPM | DIASTOLIC BLOOD PRESSURE: 80 MMHG | WEIGHT: 182 LBS | OXYGEN SATURATION: 97 % | BODY MASS INDEX: 31.07 KG/M2 | SYSTOLIC BLOOD PRESSURE: 138 MMHG | RESPIRATION RATE: 16 BRPM | HEIGHT: 64 IN | TEMPERATURE: 98.1 F

## 2023-03-07 DIAGNOSIS — N18.2 TYPE 2 DIABETES MELLITUS WITH STAGE 2 CHRONIC KIDNEY DISEASE, WITHOUT LONG-TERM CURRENT USE OF INSULIN (HCC): ICD-10-CM

## 2023-03-07 DIAGNOSIS — E21.0 PRIMARY HYPERPARATHYROIDISM (HCC): Primary | ICD-10-CM

## 2023-03-07 DIAGNOSIS — E78.00 HYPERCHOLESTEREMIA: ICD-10-CM

## 2023-03-07 DIAGNOSIS — I10 ESSENTIAL HYPERTENSION: ICD-10-CM

## 2023-03-07 DIAGNOSIS — E11.22 TYPE 2 DIABETES MELLITUS WITH STAGE 2 CHRONIC KIDNEY DISEASE, WITHOUT LONG-TERM CURRENT USE OF INSULIN (HCC): ICD-10-CM

## 2023-03-07 PROCEDURE — 3044F HG A1C LEVEL LT 7.0%: CPT | Performed by: INTERNAL MEDICINE

## 2023-03-07 PROCEDURE — 2022F DILAT RTA XM EVC RTNOPTHY: CPT | Performed by: INTERNAL MEDICINE

## 2023-03-07 PROCEDURE — G8536 NO DOC ELDER MAL SCRN: HCPCS | Performed by: INTERNAL MEDICINE

## 2023-03-07 PROCEDURE — 3017F COLORECTAL CA SCREEN DOC REV: CPT | Performed by: INTERNAL MEDICINE

## 2023-03-07 PROCEDURE — 1101F PT FALLS ASSESS-DOCD LE1/YR: CPT | Performed by: INTERNAL MEDICINE

## 2023-03-07 PROCEDURE — G8417 CALC BMI ABV UP PARAM F/U: HCPCS | Performed by: INTERNAL MEDICINE

## 2023-03-07 PROCEDURE — G8399 PT W/DXA RESULTS DOCUMENT: HCPCS | Performed by: INTERNAL MEDICINE

## 2023-03-07 PROCEDURE — 1090F PRES/ABSN URINE INCON ASSESS: CPT | Performed by: INTERNAL MEDICINE

## 2023-03-07 PROCEDURE — 99214 OFFICE O/P EST MOD 30 MIN: CPT | Performed by: INTERNAL MEDICINE

## 2023-03-07 PROCEDURE — G9899 SCRN MAM PERF RSLTS DOC: HCPCS | Performed by: INTERNAL MEDICINE

## 2023-03-07 PROCEDURE — G8510 SCR DEP NEG, NO PLAN REQD: HCPCS | Performed by: INTERNAL MEDICINE

## 2023-03-07 PROCEDURE — G8427 DOCREV CUR MEDS BY ELIG CLIN: HCPCS | Performed by: INTERNAL MEDICINE

## 2023-03-07 RX ORDER — CYANOCOBALAMIN 1000 UG/ML
INJECTION, SOLUTION INTRAMUSCULAR; SUBCUTANEOUS
Qty: 1 ML | Refills: 6
Start: 2023-03-07

## 2023-03-07 RX ORDER — PRAVASTATIN SODIUM 40 MG/1
40 TABLET ORAL DAILY
Qty: 90 TABLET | Refills: 3 | Status: SHIPPED | OUTPATIENT
Start: 2023-03-07

## 2023-03-07 NOTE — PATIENT INSTRUCTIONS
Sols Activation    Thank you for requesting access to [facility]. Please follow the instructions below to securely access and download your online medical record. [facility] allows you to send messages to your doctor, view your test results, renew your prescriptions, schedule appointments, and more. How Do I Sign Up? In your internet browser, go to [email or web address]  Click on the First Time User? Click Here link in the Sign In box. You will be redirect to the New Member Sign Up page. Enter your Sols Access Code exactly as it appears below. You will not need to use this code after youve completed the sign-up process. If you do not sign up before the expiration date, you must request a new code. Sols Access Code: [email or web address]  Expires: 3/24/2023 10:25 AM (This is the date your Sols access code will )    Enter the last four digits of your Social Security Number (xxxx) and Date of Birth (mm/dd/yyyy) as indicated and click Submit. You will be taken to the next sign-up page. Create a Sols ID. This will be your Sols login ID and cannot be changed, so think of one that is secure and easy to remember. Create a Sols password. You can change your password at any time. Enter your Password Reset Question and Answer. This can be used at a later time if you forget your password. Enter your e-mail address. You will receive e-mail notification when new information is available in [address] Click Sign Up. You can now view and download portions of your medical record. Click the [place] link to download a portable copy of your medical information. Additional Information    If you have questions, please visit the Frequently Asked Questions section of the Sols website at [email or web address] Remember, S[facility] is NOT to be used for urgent needs. For medical emergencies, dial 911.

## 2023-03-07 NOTE — PROGRESS NOTES
Chief Complaint   Patient presents with    Leg Swelling     FOLLOW UP     3 most recent PHQ Screens 3/7/2023   PHQ Not Done -   Little interest or pleasure in doing things Not at all   Feeling down, depressed, irritable, or hopeless Not at all   Total Score PHQ 2 0     1. Have you been to the ER, urgent care clinic since your last visit? Hospitalized since your last visit? NO    2. Have you seen or consulted any other health care providers outside of the [address] [place] [address] since your last visit? Include any pap smears or colon screening.  NO

## 2023-03-07 NOTE — PROGRESS NOTES
Refugio Montague is a 67 y.o. female and presents with Leg Swelling (FOLLOW UP)  . Subjective:  Hypertension Review:  The patient has hypertension . Diet and Lifestyle: generally follows a low sodium diet, exercises sporadically  Home BP Monitoring: is not measured at home. Pertinent ROS: taking medications as instructed, no medication side effects noted, no TIA's, no chest pain on exertion, no dyspnea on exertion, she has  swelling of ankles and legs reported. Dyslipidemia Review:  Patient presents for evaluation of lipids. Compliance with treatment thus far has been excellent. A repeat fasting lipid profile was not done. The patient does not use medications that may worsen dyslipidemias . The patient exercises sporadically. Diabetes Mellitus Review:  She has diabetes mellitus. Diabetic ROS - medication compliance: compliant all of the time, diabetic diet compliance: compliant all of the time, home glucose monitoring: is performed. Known diabetic complications: none  Cardiovascular risk factors: family history, dyslipidemia, diabetes mellitus, obesity, hypertension  Current diabetic medications include oral agents/insulin   Eye exam current (within one year): no  Weight trend: stable  Prior visit with dietician: no  Current diet: \"healthy\" diet  in general  Current exercise: walking  Current monitoring regimen: home blood tests - daily  Home blood sugar records: trend: stable  Any episodes of hypoglycemia? no  Lab review: orders written for new lab studies as appropriate; see orders. She needs a referral ENT       Review of Systems  Constitutional: negative for fevers, chills, anorexia and weight loss  Eyes:   negative for visual disturbance and irritation  ENT:   negative for tinnitus,sore throat,nasal congestion,ear pains. hoarseness  Respiratory:  negative for cough, hemoptysis, dyspnea,wheezing  CV:   negative for chest pain, palpitations, lower extremity edema  GI:   negative for nausea, vomiting, diarrhea, abdominal pain,melena  Endo:               negative for polyuria,polydipsia,polyphagia,heat intolerance  Genitourinary: negative for frequency, dysuria and hematuria  Integument:  negative for rash and pruritus  Hematologic:  negative for easy bruising and gum/nose bleeding  Musculoskel: negative for myalgias, arthralgias, back pain, muscle weakness, joint pain  Neurological:  negative for headaches, dizziness, vertigo, memory problems and gait   Behavl/Psych: negative for feelings of anxiety, depression, mood changes    Past Medical History:   Diagnosis Date    Arthritis     Diabetes ([address]     NIDDM    Fracture     HTN (hypertension)     Hyperlipemia     Menopause      Past Surgical History:   Procedure Laterality Date    HX CATARACT REMOVAL Bilateral 2012, 2017    HX CHOLECYSTECTOMY  2015    HX COLONOSCOPY      HX ENDOSCOPY      HX FRACTURE TX Left     HX ORTHOPAEDIC  1969    lumbar discectomy      Social History     Socioeconomic History    Marital status:    Tobacco Use    Smoking status: Never    Smokeless tobacco: Never   Vaping Use    Vaping Use: Never used   Substance and Sexual Activity    Alcohol use: No    Drug use: No     Family History   Problem Relation Age of Onset    Diabetes Father     Kidney Disease Father     Diabetes Brother     Cancer Brother     Stroke Mother     Kidney Disease Brother     Cancer Brother     Other Brother         kidney stones    Cancer Sister     Ovarian Cancer Sister     Cancer Sister         Neck/Head? Heart Disease Brother     Anesth Problems Neg Hx      Current Outpatient Medications   Medication Sig Dispense Refill    cyanocobalamin (Dodex) 1,000 mcg/mL injection ADMINISTER 1 ML IN THE MUSCLE 1 TIME EVERY 2 WEEKS 1 mL 6    pravastatin (PRAVACHOL) 40 mg tablet Take 1 Tablet by mouth daily. 90 Tablet 3    ferrous sulfate (FeroSuL) 325 mg (65 mg iron) tablet Take 1 Tablet by mouth daily.  30 Tablet 3    hydroCHLOROthiazide (HYDRODIURIL) 25 mg tablet Take 1 Tablet by mouth daily. 30 Tablet 12    cloNIDine HCL (CATAPRES) 0.1 mg tablet TAKE 1 TABLET BY MOUTH TWICE DAILY 180 Tablet 3    metoprolol tartrate (LOPRESSOR) 100 mg IR tablet TAKE 1 TABLET BY MOUTH TWICE DAILY 60 Tablet 2    glipiZIDE SR (GLUCOTROL XL) 5 mg CR tablet TAKE 1 TABLET BY MOUTH DAILY 90 Tablet 3    lancets (ReliOn Ultra Thin Plus Lancets) misc TEST BLOOD SUGAR ONCE DAILY E11.9/ DIABETES 50 Each 11    Insulin Syringe-Needle U-100 1 mL 30 gauge x 1/2\" syrg Use with b12 twice a month 6 Each 3    losartan (COZAAR) 50 mg tablet Take 1 Tablet by mouth in the morning. 90 Tablet 3    meloxicam (MOBIC) 15 mg tablet Take 1 Tablet by mouth in the morning. Indications: joint damage causing pain and loss of function 90 Tablet 3    latanoprost (XALATAN) 0.005 % ophthalmic solution       amLODIPine (NORVASC) 5 mg tablet Take 1 Tablet by mouth daily. 90 Tablet 3    metFORMIN (GLUCOPHAGE) 1,000 mg tablet Take 1 Tablet by mouth two (2) times a day. 180 Tablet 3    latanoprost (XALATAN) 0.005 % ophthalmic solution Administer 1 Drop to both eyes nightly. ReliOn Prime Test Strips strip USE TO CHECK BLOOD SUGAR ONCE DAILY 100 Strip 3    potassium chloride SR (K-TAB) 20 mEq tablet Take 40 mEq by mouth two (2) times a day. (Patient not taking: Reported on 8/30/2022)      dorzolamide HCl/timolol maleat (DORZOLAMIDE-TIMOLOL OP) Administer 1 Drop to both eyes two (2) times a day. ReliOn Prime Meter misc USE TO CHECK BLOOD SUGAR ONCE DAILY      aspirin 81 mg chewable tablet Take 81 mg by mouth every other day.        Allergies   Allergen Reactions    Enalapril Swelling       Objective:  Visit Vitals  /80 (BP 1 Location: Right arm, BP Patient Position: Sitting, BP Cuff Size: Adult)   Pulse 69   Temp 98.1 °F (36.7 °C) (Oral)   Resp 16   Ht 5' 4\" (1.626 m)   Wt 182 lb (82.6 kg)   SpO2 97%   BMI 31.24 kg/m²     Physical Exam:   General appearance - alert, well appearing, and in no distress  Mental status - alert, oriented to person, place, and time  EYE-CASEY, EOMI, corneas normal, no foreign bodies  ENT-ENT exam normal, no neck nodes or sinus tenderness  Nose - normal and patent, no erythema, discharge or polyps  Mouth - mucous membranes moist, pharynx normal without lesions  Neck - supple, no significant adenopathy   Chest - clear to auscultation, no wheezes, rales or rhonchi, symmetric air entry   Heart - normal rate, regular rhythm, normal S1, S2, no murmurs, rubs, clicks or gallops   Abdomen - soft, nontender, nondistended, no masses or organomegaly  Lymph- no adenopathy palpable  Ext-peripheral pulses normal, 2+ lower leg edema, no clubbing or cyanosis  Skin-Warm and dry. no hyperpigmentation, vitiligo, or suspicious lesions  Neuro -alert, oriented, normal speech, no focal findings or movement disorder noted  Neck-normal C-spine, no tenderness, full ROM without pain  Feet-no nail deformities or callus formation with good pulses noted      Results for orders placed or performed in visit on 02/07/23   AMB POC GLUCOSE BLOOD, BY GLUCOSE MONITORING DEVICE   Result Value Ref Range    Glucose  MG/DL   AMB POC HEMOGLOBIN A1C   Result Value Ref Range    Hemoglobin A1c (POC) 6.7 %   AMB POC LIPID PROFILE   Result Value Ref Range    Cholesterol (POC) 151     Triglycerides (POC) 92     HDL Cholesterol (POC) 57     LDL Cholesterol (POC) 75 MG/DL    Non-HDL Goal (POC) 94     TChol/HDL Ratio (POC) 2.6        Assessment/Plan:    ICD-10-CM ICD-9-CM    1. Primary hyperparathyroidism (HCC)  E21.0 252.01 REFERRAL TO ENT-OTOLARYNGOLOGY      2. Essential hypertension  I10 401.9       3. Type 2 diabetes mellitus with stage 2 chronic kidney disease, without long-term current use of insulin (HCC)  E11.22 250.40     N18.2 585.2       4.  Hypercholesteremia  E78.00 272.0           Orders Placed This Encounter    REFERRAL TO ENT-OTOLARYNGOLOGY     Referral Priority:   Routine     Referral Type:   Consultation     Referral Reason:   Specialty Services Required     Referred to Provider:   Mehran Barrera MD     Number of Visits Requested:   1    cyanocobalamin (Dodex) 1,000 mcg/mL injection     Sig: ADMINISTER 1 ML IN THE MUSCLE 1 TIME EVERY 2 WEEKS     Dispense:  1 mL     Refill:  6    pravastatin (PRAVACHOL) 40 mg tablet     Sig: Take 1 Tablet by mouth daily. Dispense:  90 Tablet     Refill:  3       lose weight, increase physical activity, follow low fat diet, follow low salt diet, continue present plan, call if any problems  There are no Patient Instructions on file for this visit. I have reviewed with the patient details of the assessment and plan and all questions were answered. Relevent patient education was performed    An After Visit Summary was printed and given to the patient.

## 2023-03-15 RX ORDER — CYANOCOBALAMIN 1000 UG/ML
INJECTION INTRAMUSCULAR; SUBCUTANEOUS
Qty: 1 ML | Refills: 6 | Status: SHIPPED | OUTPATIENT
Start: 2023-03-15 | End: 2023-03-16 | Stop reason: SDUPTHER

## 2023-03-16 RX ORDER — CYANOCOBALAMIN 1000 UG/ML
1000 INJECTION, SOLUTION INTRAMUSCULAR; SUBCUTANEOUS
Qty: 6 ML | Refills: 3
Start: 2023-03-16

## 2023-04-17 RX ORDER — METOPROLOL TARTRATE 100 MG/1
TABLET ORAL
Qty: 60 TABLET | Refills: 2 | Status: SHIPPED | OUTPATIENT
Start: 2023-04-17

## 2023-04-25 RX ORDER — METFORMIN HYDROCHLORIDE 1000 MG/1
TABLET ORAL
Qty: 180 TABLET | Refills: 3 | Status: SHIPPED | OUTPATIENT
Start: 2023-04-25

## 2023-05-05 RX ORDER — LOSARTAN POTASSIUM 50 MG/1
50 TABLET ORAL DAILY
Qty: 90 TABLET | Refills: 3 | Status: SHIPPED | OUTPATIENT
Start: 2023-05-05

## 2023-05-18 ENCOUNTER — OFFICE VISIT (OUTPATIENT)
Facility: CLINIC | Age: 73
End: 2023-05-18
Payer: MEDICARE

## 2023-05-18 VITALS
SYSTOLIC BLOOD PRESSURE: 172 MMHG | HEART RATE: 86 BPM | HEIGHT: 64 IN | BODY MASS INDEX: 31.24 KG/M2 | TEMPERATURE: 98 F | OXYGEN SATURATION: 94 % | WEIGHT: 183 LBS | RESPIRATION RATE: 16 BRPM | DIASTOLIC BLOOD PRESSURE: 96 MMHG

## 2023-05-18 DIAGNOSIS — Z12.11 SCREENING FOR COLON CANCER: ICD-10-CM

## 2023-05-18 DIAGNOSIS — Z11.59 ENCOUNTER FOR HEPATITIS C SCREENING TEST FOR LOW RISK PATIENT: ICD-10-CM

## 2023-05-18 DIAGNOSIS — E21.0 PRIMARY HYPERPARATHYROIDISM (HCC): ICD-10-CM

## 2023-05-18 DIAGNOSIS — E78.00 HYPERCHOLESTEREMIA: ICD-10-CM

## 2023-05-18 DIAGNOSIS — I10 ESSENTIAL (PRIMARY) HYPERTENSION: Primary | ICD-10-CM

## 2023-05-18 DIAGNOSIS — F32.9 REACTIVE DEPRESSION: ICD-10-CM

## 2023-05-18 PROCEDURE — G8417 CALC BMI ABV UP PARAM F/U: HCPCS | Performed by: INTERNAL MEDICINE

## 2023-05-18 PROCEDURE — 3077F SYST BP >= 140 MM HG: CPT | Performed by: INTERNAL MEDICINE

## 2023-05-18 PROCEDURE — 1123F ACP DISCUSS/DSCN MKR DOCD: CPT | Performed by: INTERNAL MEDICINE

## 2023-05-18 PROCEDURE — 3017F COLORECTAL CA SCREEN DOC REV: CPT | Performed by: INTERNAL MEDICINE

## 2023-05-18 PROCEDURE — 3080F DIAST BP >= 90 MM HG: CPT | Performed by: INTERNAL MEDICINE

## 2023-05-18 PROCEDURE — G8399 PT W/DXA RESULTS DOCUMENT: HCPCS | Performed by: INTERNAL MEDICINE

## 2023-05-18 PROCEDURE — 99214 OFFICE O/P EST MOD 30 MIN: CPT | Performed by: INTERNAL MEDICINE

## 2023-05-18 PROCEDURE — 1036F TOBACCO NON-USER: CPT | Performed by: INTERNAL MEDICINE

## 2023-05-18 PROCEDURE — G8427 DOCREV CUR MEDS BY ELIG CLIN: HCPCS | Performed by: INTERNAL MEDICINE

## 2023-05-18 PROCEDURE — 1090F PRES/ABSN URINE INCON ASSESS: CPT | Performed by: INTERNAL MEDICINE

## 2023-05-18 RX ORDER — SERTRALINE HYDROCHLORIDE 25 MG/1
TABLET, FILM COATED ORAL
Qty: 30 TABLET | Refills: 3 | Status: SHIPPED | OUTPATIENT
Start: 2023-05-18

## 2023-05-18 RX ORDER — CLONIDINE HYDROCHLORIDE 0.1 MG/1
1 TABLET ORAL 2 TIMES DAILY
COMMUNITY
Start: 2023-02-06

## 2023-05-18 RX ORDER — GLIPIZIDE 5 MG/1
TABLET, FILM COATED, EXTENDED RELEASE ORAL
COMMUNITY
Start: 2023-04-15

## 2023-05-18 RX ORDER — HYDROCHLOROTHIAZIDE 25 MG/1
25 TABLET ORAL DAILY
COMMUNITY
Start: 2023-02-07

## 2023-05-18 RX ORDER — AMLODIPINE BESYLATE 5 MG/1
5 TABLET ORAL DAILY
Qty: 30 TABLET | Status: CANCELLED | OUTPATIENT
Start: 2023-05-18

## 2023-05-18 RX ORDER — METOPROLOL TARTRATE 100 MG/1
100 TABLET ORAL 2 TIMES DAILY
Qty: 180 TABLET | Refills: 3 | Status: SHIPPED | OUTPATIENT
Start: 2023-05-18

## 2023-05-18 RX ORDER — METOPROLOL TARTRATE 100 MG/1
1 TABLET ORAL 2 TIMES DAILY
COMMUNITY
Start: 2021-01-03 | End: 2023-05-18 | Stop reason: SDUPTHER

## 2023-05-18 RX ORDER — PRAVASTATIN SODIUM 40 MG
40 TABLET ORAL DAILY
COMMUNITY
Start: 2020-12-22

## 2023-05-18 RX ORDER — LOSARTAN POTASSIUM 50 MG/1
50 TABLET ORAL DAILY
COMMUNITY
Start: 2020-05-06 | End: 2023-05-18 | Stop reason: SDUPTHER

## 2023-05-18 RX ORDER — AMLODIPINE BESYLATE 5 MG/1
5 TABLET ORAL DAILY
Qty: 90 TABLET | Refills: 3 | Status: SHIPPED | OUTPATIENT
Start: 2023-05-18

## 2023-05-18 RX ORDER — LOSARTAN POTASSIUM 50 MG/1
50 TABLET ORAL DAILY
Qty: 90 TABLET | Refills: 3 | Status: SHIPPED | OUTPATIENT
Start: 2023-05-18

## 2023-05-18 RX ORDER — MELOXICAM 15 MG/1
15 TABLET ORAL DAILY
Qty: 90 TABLET | Refills: 3 | Status: SHIPPED | OUTPATIENT
Start: 2023-05-18

## 2023-05-18 RX ORDER — AMLODIPINE BESYLATE 5 MG/1
5 TABLET ORAL DAILY
COMMUNITY
End: 2023-05-18

## 2023-05-18 RX ORDER — MELOXICAM 15 MG/1
TABLET ORAL
COMMUNITY
Start: 2023-04-15 | End: 2023-05-18 | Stop reason: SDUPTHER

## 2023-05-18 RX ORDER — FERROUS SULFATE 325(65) MG
325 TABLET ORAL DAILY
COMMUNITY
Start: 2023-02-07

## 2023-05-18 SDOH — ECONOMIC STABILITY: FOOD INSECURITY: WITHIN THE PAST 12 MONTHS, YOU WORRIED THAT YOUR FOOD WOULD RUN OUT BEFORE YOU GOT MONEY TO BUY MORE.: PATIENT DECLINED

## 2023-05-18 SDOH — ECONOMIC STABILITY: FOOD INSECURITY: WITHIN THE PAST 12 MONTHS, THE FOOD YOU BOUGHT JUST DIDN'T LAST AND YOU DIDN'T HAVE MONEY TO GET MORE.: PATIENT DECLINED

## 2023-05-18 SDOH — ECONOMIC STABILITY: HOUSING INSECURITY
IN THE LAST 12 MONTHS, WAS THERE A TIME WHEN YOU DID NOT HAVE A STEADY PLACE TO SLEEP OR SLEPT IN A SHELTER (INCLUDING NOW)?: NO

## 2023-05-18 SDOH — ECONOMIC STABILITY: INCOME INSECURITY: HOW HARD IS IT FOR YOU TO PAY FOR THE VERY BASICS LIKE FOOD, HOUSING, MEDICAL CARE, AND HEATING?: SOMEWHAT HARD

## 2023-05-18 ASSESSMENT — PATIENT HEALTH QUESTIONNAIRE - PHQ9
2. FEELING DOWN, DEPRESSED OR HOPELESS: 0
SUM OF ALL RESPONSES TO PHQ QUESTIONS 1-9: 0
SUM OF ALL RESPONSES TO PHQ9 QUESTIONS 1 & 2: 0
SUM OF ALL RESPONSES TO PHQ QUESTIONS 1-9: 0
1. LITTLE INTEREST OR PLEASURE IN DOING THINGS: 0

## 2023-05-18 NOTE — PATIENT INSTRUCTIONS
Thank you for enrolling in [address] Please follow the instructions below to securely access your online medical record. [facility] allows you to send messages to your doctor, view your test results, renew your prescriptions, schedule appointments, and more. How Do I Sign Up? In your Internet browser, go to [email or web address]  Click on the Sign Up Now link in the Sign In box. You will see the New Member Sign Up page. Enter your [employer] Access Code exactly as it appears below. You will not need to use this code after youve completed the sign-up process. If you do not sign up before the expiration date, you must request a new code. [facility] Access Code: Activation code not generated  Current [facility] Status: Patient Declined    Enter your Social Security Number ([id number]) and Date of Birth (mm/dd/yyyy) as indicated and click Submit. You will be taken to the next sign-up page. Create a [facility] ID. This will be your [facility] login ID and cannot be changed, so think of one that is secure and easy to remember. Create a [facility] password. You can change your password at any time. Enter your Password Reset Question and Answer. This can be used at a later time if you forget your password. Enter your e-mail address. You will receive e-mail notification when new information is available in [address] Click Sign Up. You can now view your medical record. Additional Information  If you have questions, please contact your physician practice where you receive care. Remember, [facility] is NOT to be used for urgent needs. For medical emergencies, dial 911.

## 2023-05-18 NOTE — PROGRESS NOTES
1. Have you been to the ER, urgent care clinic since your last visit? Hospitalized since your last visit? No    2. Have you seen or consulted any other health care providers outside of the [address] [place] [address] since your last visit? Include any pap smears or colon screening. No     Chief Complaint   Patient presents with    Pre-op Exam         PHQ-9 Total Score: 0 (5/18/2023  1:29 PM)  1. Have you been to the ER, urgent care clinic since your last visit? Hospitalized since your last visit? No    2. Have you seen or consulted any other health care providers outside of the [address] [place] [address] since your last visit? Include any pap smears or colon screening.  No     Chief Complaint   Patient presents with    Pre-op Exam         PHQ-9 Total Score: 0 (5/18/2023  1:29 PM)

## 2023-05-18 NOTE — PROGRESS NOTES
Tamar Pugh is a 68 y.o. female and presents with Pre-op Exam  .  Subjective:    She needs a pre-op evaluation prior to thyroid surgery    Depression Review:  Patient is seen for followup of depression. Ongoing depressed mood, difficulty concentrating, hopelessness, hypersomnia, and psychomotor agitation Treatment includes  none  and none. She denies recurrent thoughts of death and suicidal thoughts without plan. She experiences the following side effects from the treatment: none. The patient is not followed by psychiatry. Hypertension Review:  The patient has essential hypertension  Diet and Lifestyle: generally follows a  low sodium diet, exercises sporadically  Home BP Monitoring: is not measured at home. Pertinent ROS: taking medications as instructed, no medication side effects noted, no TIA's, no chest pain on exertion, no dyspnea on exertion, no swelling of ankles. Diabetes Mellitus Review:  She has diabetes mellitus. Diabetic ROS - medication compliance: compliant all of the time, diabetic diet compliance: compliant all of the time, home glucose monitoring: is performed. Known diabetic complications: none  Cardiovascular risk factors: family history, dyslipidemia, diabetes mellitus, obesity, hypertension  Current diabetic medications include oral agents/   Eye exam current (within one year): no  Weight trend: stable  Prior visit with dietician: no  Current diet: \"healthy\" diet  in general  Current exercise: walking  Current monitoring regimen: home blood tests - daily  Home blood sugar records: trend: stable  Any episodes of hypoglycemia? no  Is She on ACE inhibitor or angiotensin II receptor blocker? Yes       Dyslipidemia Review:  Patient presents for evaluation of lipids. Compliance with treatment thus far has been excellent. A repeat fasting lipid profile was not done.   The patient does not use medications that may worsen dyslipidemias (corticosteroids, progestins, anabolic

## 2023-05-19 LAB
ALBUMIN SERPL-MCNC: 4.1 G/DL (ref 3.5–5)
ALBUMIN/GLOB SERPL: 1.3 (ref 1.1–2.2)
ALP SERPL-CCNC: 67 U/L (ref 45–117)
ALT SERPL-CCNC: 16 U/L (ref 12–78)
ANION GAP SERPL CALC-SCNC: 8 MMOL/L (ref 5–15)
AST SERPL-CCNC: 13 U/L (ref 15–37)
BILIRUB SERPL-MCNC: 0.3 MG/DL (ref 0.2–1)
BUN SERPL-MCNC: 22 MG/DL (ref 6–20)
BUN/CREAT SERPL: 21 (ref 12–20)
CALCIUM SERPL-MCNC: 10.6 MG/DL (ref 8.5–10.1)
CHLORIDE SERPL-SCNC: 107 MMOL/L (ref 97–108)
CHOLEST SERPL-MCNC: 142 MG/DL
CO2 SERPL-SCNC: 27 MMOL/L (ref 21–32)
CREAT SERPL-MCNC: 1.07 MG/DL (ref 0.55–1.02)
ERYTHROCYTE [DISTWIDTH] IN BLOOD BY AUTOMATED COUNT: 13.3 % (ref 11.5–14.5)
GLOBULIN SER CALC-MCNC: 3.1 G/DL (ref 2–4)
GLUCOSE SERPL-MCNC: 91 MG/DL (ref 65–100)
HCT VFR BLD AUTO: 40.1 % (ref 35–47)
HCV AB SERPL QL IA: NONREACTIVE
HDLC SERPL-MCNC: 50 MG/DL
HDLC SERPL: 2.8 (ref 0–5)
HGB BLD-MCNC: 12.2 G/DL (ref 11.5–16)
LDLC SERPL CALC-MCNC: 69.4 MG/DL (ref 0–100)
MCH RBC QN AUTO: 28.4 PG (ref 26–34)
MCHC RBC AUTO-ENTMCNC: 30.4 G/DL (ref 30–36.5)
MCV RBC AUTO: 93.3 FL (ref 80–99)
NRBC # BLD: 0 K/UL (ref 0–0.01)
NRBC BLD-RTO: 0 PER 100 WBC
PLATELET # BLD AUTO: 234 K/UL (ref 150–400)
PMV BLD AUTO: 12.7 FL (ref 8.9–12.9)
POTASSIUM SERPL-SCNC: 3.6 MMOL/L (ref 3.5–5.1)
PROT SERPL-MCNC: 7.2 G/DL (ref 6.4–8.2)
RBC # BLD AUTO: 4.3 M/UL (ref 3.8–5.2)
SODIUM SERPL-SCNC: 142 MMOL/L (ref 136–145)
TRIGL SERPL-MCNC: 113 MG/DL
VLDLC SERPL CALC-MCNC: 22.6 MG/DL
WBC # BLD AUTO: 8.8 K/UL (ref 3.6–11)

## 2023-05-20 RX ORDER — CYANOCOBALAMIN 1000 UG/ML
1000 INJECTION, SOLUTION INTRAMUSCULAR; SUBCUTANEOUS
COMMUNITY
Start: 2023-03-16

## 2023-05-20 RX ORDER — GLIPIZIDE 5 MG/1
5 TABLET, FILM COATED, EXTENDED RELEASE ORAL DAILY
COMMUNITY
Start: 2023-01-15 | End: 2023-06-02

## 2023-05-20 RX ORDER — ASPIRIN 81 MG/1
81 TABLET, CHEWABLE ORAL EVERY OTHER DAY
Status: ON HOLD | COMMUNITY
End: 2023-06-09 | Stop reason: HOSPADM

## 2023-05-20 RX ORDER — AMLODIPINE BESYLATE 5 MG/1
5 TABLET ORAL DAILY
COMMUNITY
Start: 2022-06-28

## 2023-05-20 RX ORDER — LATANOPROST 50 UG/ML
1 SOLUTION/ DROPS OPHTHALMIC NIGHTLY
COMMUNITY
Start: 2017-03-29

## 2023-05-20 RX ORDER — MELOXICAM 15 MG/1
15 TABLET ORAL DAILY
COMMUNITY
Start: 2022-08-02

## 2023-05-25 ENCOUNTER — OFFICE VISIT (OUTPATIENT)
Facility: CLINIC | Age: 73
End: 2023-05-25
Payer: MEDICARE

## 2023-05-25 VITALS
HEART RATE: 86 BPM | OXYGEN SATURATION: 99 % | HEIGHT: 64 IN | SYSTOLIC BLOOD PRESSURE: 140 MMHG | RESPIRATION RATE: 16 BRPM | WEIGHT: 184 LBS | TEMPERATURE: 98.5 F | DIASTOLIC BLOOD PRESSURE: 80 MMHG | BODY MASS INDEX: 31.41 KG/M2

## 2023-05-25 DIAGNOSIS — E21.0 PRIMARY HYPERPARATHYROIDISM (HCC): ICD-10-CM

## 2023-05-25 DIAGNOSIS — E11.49 TYPE 2 DIABETES MELLITUS WITH OTHER DIABETIC NEUROLOGICAL COMPLICATION (HCC): ICD-10-CM

## 2023-05-25 DIAGNOSIS — E78.00 HYPERCHOLESTEREMIA: ICD-10-CM

## 2023-05-25 DIAGNOSIS — I10 ESSENTIAL (PRIMARY) HYPERTENSION: Primary | ICD-10-CM

## 2023-05-25 PROBLEM — F33.0 MAJOR DEPRESSIVE DISORDER, RECURRENT, MILD (HCC): Status: ACTIVE | Noted: 2023-05-25

## 2023-05-25 PROBLEM — F33.1 MAJOR DEPRESSIVE DISORDER, RECURRENT, MODERATE (HCC): Status: ACTIVE | Noted: 2023-05-25

## 2023-05-25 PROBLEM — F33.9 MAJOR DEPRESSIVE DISORDER, RECURRENT, UNSPECIFIED (HCC): Status: ACTIVE | Noted: 2023-05-25

## 2023-05-25 PROCEDURE — 1090F PRES/ABSN URINE INCON ASSESS: CPT | Performed by: INTERNAL MEDICINE

## 2023-05-25 PROCEDURE — 3079F DIAST BP 80-89 MM HG: CPT | Performed by: INTERNAL MEDICINE

## 2023-05-25 PROCEDURE — 1123F ACP DISCUSS/DSCN MKR DOCD: CPT | Performed by: INTERNAL MEDICINE

## 2023-05-25 PROCEDURE — 3075F SYST BP GE 130 - 139MM HG: CPT | Performed by: INTERNAL MEDICINE

## 2023-05-25 PROCEDURE — 2022F DILAT RTA XM EVC RTNOPTHY: CPT | Performed by: INTERNAL MEDICINE

## 2023-05-25 PROCEDURE — G8417 CALC BMI ABV UP PARAM F/U: HCPCS | Performed by: INTERNAL MEDICINE

## 2023-05-25 PROCEDURE — G8399 PT W/DXA RESULTS DOCUMENT: HCPCS | Performed by: INTERNAL MEDICINE

## 2023-05-25 PROCEDURE — 3046F HEMOGLOBIN A1C LEVEL >9.0%: CPT | Performed by: INTERNAL MEDICINE

## 2023-05-25 PROCEDURE — G8428 CUR MEDS NOT DOCUMENT: HCPCS | Performed by: INTERNAL MEDICINE

## 2023-05-25 PROCEDURE — 1036F TOBACCO NON-USER: CPT | Performed by: INTERNAL MEDICINE

## 2023-05-25 PROCEDURE — 99214 OFFICE O/P EST MOD 30 MIN: CPT | Performed by: INTERNAL MEDICINE

## 2023-05-25 PROCEDURE — 3017F COLORECTAL CA SCREEN DOC REV: CPT | Performed by: INTERNAL MEDICINE

## 2023-05-25 NOTE — PROGRESS NOTES
Cecy Varner is a 68 y.o. female and presents with Hypertension  . Subjective:    She needs a pre-op evaluation prior to thyroid surgery   her last bp was elevated     Hypertension Review:  The patient has essential hypertension  Diet and Lifestyle: generally follows a  low sodium diet, exercises sporadically  Home BP Monitoring: is not measured at home. Pertinent ROS: taking medications as instructed, no medication side effects noted, no TIA's, no chest pain on exertion, no dyspnea on exertion, no swelling of ankles. Diabetes Mellitus Review:  She has diabetes mellitus. Diabetic ROS - medication compliance: compliant all of the time, diabetic diet compliance: compliant all of the time, home glucose monitoring: is performed. Known diabetic complications: none  Cardiovascular risk factors: family history, dyslipidemia, diabetes mellitus, obesity, hypertension  Current diabetic medications include oral agents/   Eye exam current (within one year): no  Weight trend: stable  Prior visit with dietician: no  Current diet: \"healthy\" diet  in general  Current exercise: walking  Current monitoring regimen: home blood tests - daily  Home blood sugar records: trend: stable  Any episodes of hypoglycemia? no  Is She on ACE inhibitor or angiotensin II receptor blocker? Yes         Dyslipidemia Review:  Patient presents for evaluation of lipids. Compliance with treatment thus far has been excellent. A repeat fasting lipid profile was not done. The patient does not use medications that may worsen dyslipidemias (corticosteroids, progestins, anabolic steroids, amiodarone, cyclosporine, olanzapine). The patient exercises some    Review of Systems  Constitutional: negative for fevers, chills, anorexia and weight loss  Eyes:   negative for visual disturbance and irritation  ENT:   negative for tinnitus,sore throat,nasal congestion,ear pains. hoarseness  Respiratory:  negative for cough, hemoptysis,

## 2023-05-25 NOTE — PROGRESS NOTES
Chief Complaint   Patient presents with    Hypertension       1. Have you been to the ER, urgent care clinic since your last visit? Hospitalized since your last visit? No    2. Have you seen or consulted any other health care providers outside of the [address] [place] [address] since your last visit? Include any pap smears or colon screening.  No

## 2023-05-25 NOTE — PATIENT INSTRUCTIONS
Thank you for enrolling in [address] Please follow the instructions below to securely access your online medical record. [facility] allows you to send messages to your doctor, view your test results, renew your prescriptions, schedule appointments, and more. How Do I Sign Up? In your Internet browser, go to [email or web address]  Click on the Sign Up Now link in the Sign In box. You will see the New Member Sign Up page. Enter your [employer] Access Code exactly as it appears below. You will not need to use this code after youve completed the sign-up process. If you do not sign up before the expiration date, you must request a new code. [facility] Access Code: Activation code not generated  Current [facility] Status: Patient Declined    Enter your Social Security Number ([id number]) and Date of Birth (mm/dd/yyyy) as indicated and click Submit. You will be taken to the next sign-up page. Create a [facility] ID. This will be your [facility] login ID and cannot be changed, so think of one that is secure and easy to remember. Create a [facility] password. You can change your password at any time. Enter your Password Reset Question and Answer. This can be used at a later time if you forget your password. Enter your e-mail address. You will receive e-mail notification when new information is available in [address] Click Sign Up. You can now view your medical record. Additional Information  If you have questions, please contact your physician practice where you receive care. Remember, [facility] is NOT to be used for urgent needs. For medical emergencies, dial 911.

## 2023-05-31 LAB — HEMOCCULT STL QL IA: NEGATIVE

## 2023-06-02 ENCOUNTER — HOSPITAL ENCOUNTER (OUTPATIENT)
Facility: HOSPITAL | Age: 73
End: 2023-06-02
Payer: MEDICARE

## 2023-06-02 VITALS
WEIGHT: 183.8 LBS | OXYGEN SATURATION: 95 % | BODY MASS INDEX: 30.62 KG/M2 | SYSTOLIC BLOOD PRESSURE: 131 MMHG | HEIGHT: 65 IN | HEART RATE: 67 BPM | RESPIRATION RATE: 18 BRPM | DIASTOLIC BLOOD PRESSURE: 73 MMHG | TEMPERATURE: 98.2 F

## 2023-06-02 LAB
EKG ATRIAL RATE: 64 BPM
EKG DIAGNOSIS: NORMAL
EKG P AXIS: 42 DEGREES
EKG P-R INTERVAL: 166 MS
EKG Q-T INTERVAL: 418 MS
EKG QRS DURATION: 100 MS
EKG QTC CALCULATION (BAZETT): 431 MS
EKG R AXIS: -49 DEGREES
EKG T AXIS: -56 DEGREES
EKG VENTRICULAR RATE: 64 BPM
EST. AVERAGE GLUCOSE BLD GHB EST-MCNC: 146 MG/DL
HBA1C MFR BLD: 6.7 % (ref 4–5.6)

## 2023-06-02 PROCEDURE — 83036 HEMOGLOBIN GLYCOSYLATED A1C: CPT

## 2023-06-02 PROCEDURE — 36415 COLL VENOUS BLD VENIPUNCTURE: CPT

## 2023-06-02 RX ORDER — DORZOLAMIDE HCL 20 MG/ML
1 SOLUTION/ DROPS OPHTHALMIC 2 TIMES DAILY
COMMUNITY

## 2023-06-02 NOTE — PERIOP NOTE
Pre op and medication instructions printed and reviewed with patient and Son-Manolo . Two bottles of chg soap given. Surgical site infection sheet given. Opportunity for questions given and all questions were answered.     Consent signed and on chart for scheduled surgery on June 8, 2023 with Casandra Corrales

## 2023-06-02 NOTE — PERIOP NOTE
[address] INSTRUCTIONS    Surgery Date:   06-    Your surgeon's office or [facility] staff will call you between 4 PM- 8 PM the day before surgery with your arrival time. If your surgery is on a Monday, you will receive a call the preceding Friday. Please report to [facility] Patient Access/Admitting on the 1st floor. Bring your insurance card, photo identification, and any copayment ( if applicable). If you are going home the same day of your surgery, you must have a responsible adult to drive you home. You need to have a responsible adult to stay with you the first 24 hours after surgery and you should not drive a car for 24 hours following your surgery. Nothing to eat or drink after midnight the night before surgery. This includes no water, gum, mints, coffee, juice, etc.  Please note special instructions, if applicable, below for medications. Do NOT drink alcohol or smoke 24 hours before surgery. STOP smoking for 14 days prior as it helps with breathing and healing after surgery. If you are being admitted to the hospital, please leave personal belongings/luggage in your car until you have an assigned hospital room number. Please wear comfortable clothes. Wear your glasses instead of contacts. We ask that all money, jewelry and valuables be left at home. Wear no make up, particularly mascara, the day of surgery. All body piercings, rings, and jewelry need to be removed and left at home. Please remove any nail polish or artificial nails from your fingernails. Please wear your hair loose or down. Please no pony-tails, buns, or any metal hair accessories. If you shower the morning of surgery, please do not apply any lotions or powders afterwards. You may wear deodorant, unless having breast surgery. Do not shave any body area within 24 hours of your surgery. Please follow all instructions to avoid any potential surgical cancellation.   Should your physical condition

## 2023-06-08 ENCOUNTER — ANESTHESIA EVENT (OUTPATIENT)
Facility: HOSPITAL | Age: 73
End: 2023-06-08
Payer: MEDICARE

## 2023-06-08 ENCOUNTER — ANESTHESIA (OUTPATIENT)
Facility: HOSPITAL | Age: 73
End: 2023-06-08
Payer: MEDICARE

## 2023-06-08 ENCOUNTER — HOSPITAL ENCOUNTER (OUTPATIENT)
Facility: HOSPITAL | Age: 73
Setting detail: OBSERVATION
Discharge: HOME | End: 2023-06-09
Attending: OTOLARYNGOLOGY | Admitting: OTOLARYNGOLOGY
Payer: MEDICARE

## 2023-06-08 DIAGNOSIS — E21.0 PRIMARY HYPERPARATHYROIDISM (HCC): Primary | ICD-10-CM

## 2023-06-08 LAB
GLUCOSE BLD STRIP.AUTO-MCNC: 165 MG/DL (ref 65–117)
GLUCOSE BLD STRIP.AUTO-MCNC: 174 MG/DL (ref 65–117)
GLUCOSE BLD STRIP.AUTO-MCNC: 279 MG/DL (ref 65–117)
SERVICE CMNT-IMP: ABNORMAL

## 2023-06-08 PROCEDURE — 6370000000 HC RX 637 (ALT 250 FOR IP): Performed by: OTOLARYNGOLOGY

## 2023-06-08 PROCEDURE — 83970 ASSAY OF PARATHORMONE: CPT

## 2023-06-08 PROCEDURE — 3600000014 HC SURGERY LEVEL 4 ADDTL 15MIN: Performed by: OTOLARYNGOLOGY

## 2023-06-08 PROCEDURE — 6370000000 HC RX 637 (ALT 250 FOR IP): Performed by: STUDENT IN AN ORGANIZED HEALTH CARE EDUCATION/TRAINING PROGRAM

## 2023-06-08 PROCEDURE — 2580000003 HC RX 258: Performed by: OTOLARYNGOLOGY

## 2023-06-08 PROCEDURE — 2500000003 HC RX 250 WO HCPCS: Performed by: OTOLARYNGOLOGY

## 2023-06-08 PROCEDURE — G0378 HOSPITAL OBSERVATION PER HR: HCPCS

## 2023-06-08 PROCEDURE — 3700000000 HC ANESTHESIA ATTENDED CARE: Performed by: OTOLARYNGOLOGY

## 2023-06-08 PROCEDURE — 3700000001 HC ADD 15 MINUTES (ANESTHESIA): Performed by: OTOLARYNGOLOGY

## 2023-06-08 PROCEDURE — 6360000002 HC RX W HCPCS: Performed by: NURSE ANESTHETIST, CERTIFIED REGISTERED

## 2023-06-08 PROCEDURE — 36415 COLL VENOUS BLD VENIPUNCTURE: CPT

## 2023-06-08 PROCEDURE — 7100000000 HC PACU RECOVERY - FIRST 15 MIN: Performed by: OTOLARYNGOLOGY

## 2023-06-08 PROCEDURE — 2709999900 HC NON-CHARGEABLE SUPPLY: Performed by: OTOLARYNGOLOGY

## 2023-06-08 PROCEDURE — 82962 GLUCOSE BLOOD TEST: CPT

## 2023-06-08 PROCEDURE — 2580000003 HC RX 258: Performed by: STUDENT IN AN ORGANIZED HEALTH CARE EDUCATION/TRAINING PROGRAM

## 2023-06-08 PROCEDURE — 3600000004 HC SURGERY LEVEL 4 BASE: Performed by: OTOLARYNGOLOGY

## 2023-06-08 PROCEDURE — 2580000003 HC RX 258: Performed by: NURSE ANESTHETIST, CERTIFIED REGISTERED

## 2023-06-08 PROCEDURE — 7100000001 HC PACU RECOVERY - ADDTL 15 MIN: Performed by: OTOLARYNGOLOGY

## 2023-06-08 PROCEDURE — 2500000003 HC RX 250 WO HCPCS: Performed by: NURSE ANESTHETIST, CERTIFIED REGISTERED

## 2023-06-08 RX ORDER — DEXTROSE, SODIUM CHLORIDE, AND POTASSIUM CHLORIDE 5; .45; .15 G/100ML; G/100ML; G/100ML
INJECTION INTRAVENOUS
Status: DISCONTINUED
Start: 2023-06-08 | End: 2023-06-08 | Stop reason: WASHOUT

## 2023-06-08 RX ORDER — SODIUM CHLORIDE, SODIUM LACTATE, POTASSIUM CHLORIDE, CALCIUM CHLORIDE 600; 310; 30; 20 MG/100ML; MG/100ML; MG/100ML; MG/100ML
INJECTION, SOLUTION INTRAVENOUS CONTINUOUS
Status: DISCONTINUED | OUTPATIENT
Start: 2023-06-08 | End: 2023-06-08 | Stop reason: HOSPADM

## 2023-06-08 RX ORDER — DEXAMETHASONE SODIUM PHOSPHATE 4 MG/ML
INJECTION, SOLUTION INTRA-ARTICULAR; INTRALESIONAL; INTRAMUSCULAR; INTRAVENOUS; SOFT TISSUE PRN
Status: DISCONTINUED | OUTPATIENT
Start: 2023-06-08 | End: 2023-06-08 | Stop reason: SDUPTHER

## 2023-06-08 RX ORDER — FENTANYL CITRATE 50 UG/ML
INJECTION, SOLUTION INTRAMUSCULAR; INTRAVENOUS PRN
Status: DISCONTINUED | OUTPATIENT
Start: 2023-06-08 | End: 2023-06-08 | Stop reason: SDUPTHER

## 2023-06-08 RX ORDER — DORZOLAMIDE HCL 20 MG/ML
1 SOLUTION/ DROPS OPHTHALMIC 2 TIMES DAILY
Status: DISCONTINUED | OUTPATIENT
Start: 2023-06-08 | End: 2023-06-10 | Stop reason: HOSPADM

## 2023-06-08 RX ORDER — SODIUM CHLORIDE 9 MG/ML
INJECTION, SOLUTION INTRAVENOUS PRN
Status: DISCONTINUED | OUTPATIENT
Start: 2023-06-08 | End: 2023-06-08 | Stop reason: HOSPADM

## 2023-06-08 RX ORDER — SODIUM CHLORIDE 0.9 % (FLUSH) 0.9 %
5-40 SYRINGE (ML) INJECTION PRN
Status: DISCONTINUED | OUTPATIENT
Start: 2023-06-08 | End: 2023-06-10 | Stop reason: HOSPADM

## 2023-06-08 RX ORDER — OXYCODONE HYDROCHLORIDE 5 MG/1
10 TABLET ORAL EVERY 4 HOURS PRN
Status: DISCONTINUED | OUTPATIENT
Start: 2023-06-08 | End: 2023-06-10 | Stop reason: HOSPADM

## 2023-06-08 RX ORDER — ONDANSETRON 2 MG/ML
4 INJECTION INTRAMUSCULAR; INTRAVENOUS EVERY 6 HOURS PRN
Status: DISCONTINUED | OUTPATIENT
Start: 2023-06-08 | End: 2023-06-10 | Stop reason: HOSPADM

## 2023-06-08 RX ORDER — SODIUM CHLORIDE 0.9 % (FLUSH) 0.9 %
5-40 SYRINGE (ML) INJECTION EVERY 12 HOURS SCHEDULED
Status: DISCONTINUED | OUTPATIENT
Start: 2023-06-08 | End: 2023-06-10 | Stop reason: HOSPADM

## 2023-06-08 RX ORDER — ONDANSETRON 4 MG/1
4 TABLET, ORALLY DISINTEGRATING ORAL EVERY 8 HOURS PRN
Status: DISCONTINUED | OUTPATIENT
Start: 2023-06-08 | End: 2023-06-10 | Stop reason: HOSPADM

## 2023-06-08 RX ORDER — SODIUM CHLORIDE 0.9 % (FLUSH) 0.9 %
5-40 SYRINGE (ML) INJECTION PRN
Status: DISCONTINUED | OUTPATIENT
Start: 2023-06-08 | End: 2023-06-08 | Stop reason: HOSPADM

## 2023-06-08 RX ORDER — METOPROLOL TARTRATE 50 MG/1
100 TABLET, FILM COATED ORAL 2 TIMES DAILY
Status: DISCONTINUED | OUTPATIENT
Start: 2023-06-08 | End: 2023-06-10 | Stop reason: HOSPADM

## 2023-06-08 RX ORDER — SODIUM CHLORIDE 0.9 % (FLUSH) 0.9 %
5-40 SYRINGE (ML) INJECTION EVERY 12 HOURS SCHEDULED
Status: DISCONTINUED | OUTPATIENT
Start: 2023-06-08 | End: 2023-06-08 | Stop reason: HOSPADM

## 2023-06-08 RX ORDER — FENTANYL CITRATE 50 UG/ML
100 INJECTION, SOLUTION INTRAMUSCULAR; INTRAVENOUS
Status: DISCONTINUED | OUTPATIENT
Start: 2023-06-08 | End: 2023-06-08 | Stop reason: HOSPADM

## 2023-06-08 RX ORDER — LATANOPROST 50 UG/ML
1 SOLUTION/ DROPS OPHTHALMIC NIGHTLY
Status: DISCONTINUED | OUTPATIENT
Start: 2023-06-08 | End: 2023-06-10 | Stop reason: HOSPADM

## 2023-06-08 RX ORDER — MIDAZOLAM HYDROCHLORIDE 1 MG/ML
INJECTION INTRAMUSCULAR; INTRAVENOUS PRN
Status: DISCONTINUED | OUTPATIENT
Start: 2023-06-08 | End: 2023-06-08 | Stop reason: SDUPTHER

## 2023-06-08 RX ORDER — MIDAZOLAM HYDROCHLORIDE 2 MG/2ML
2 INJECTION, SOLUTION INTRAMUSCULAR; INTRAVENOUS
Status: DISCONTINUED | OUTPATIENT
Start: 2023-06-08 | End: 2023-06-08 | Stop reason: HOSPADM

## 2023-06-08 RX ORDER — PROCHLORPERAZINE EDISYLATE 5 MG/ML
5 INJECTION INTRAMUSCULAR; INTRAVENOUS
Status: DISCONTINUED | OUTPATIENT
Start: 2023-06-08 | End: 2023-06-08 | Stop reason: HOSPADM

## 2023-06-08 RX ORDER — LOSARTAN POTASSIUM 50 MG/1
50 TABLET ORAL DAILY
Status: DISCONTINUED | OUTPATIENT
Start: 2023-06-08 | End: 2023-06-10 | Stop reason: HOSPADM

## 2023-06-08 RX ORDER — FERROUS SULFATE 325(65) MG
325 TABLET ORAL DAILY
Status: DISCONTINUED | OUTPATIENT
Start: 2023-06-08 | End: 2023-06-10 | Stop reason: HOSPADM

## 2023-06-08 RX ORDER — AMLODIPINE BESYLATE 5 MG/1
5 TABLET ORAL DAILY
Status: DISCONTINUED | OUTPATIENT
Start: 2023-06-08 | End: 2023-06-10 | Stop reason: HOSPADM

## 2023-06-08 RX ORDER — GLIPIZIDE 5 MG/1
5 TABLET ORAL
Status: DISCONTINUED | OUTPATIENT
Start: 2023-06-09 | End: 2023-06-10 | Stop reason: HOSPADM

## 2023-06-08 RX ORDER — SUCCINYLCHOLINE/SOD CL,ISO/PF 200MG/10ML
SYRINGE (ML) INTRAVENOUS PRN
Status: DISCONTINUED | OUTPATIENT
Start: 2023-06-08 | End: 2023-06-08 | Stop reason: SDUPTHER

## 2023-06-08 RX ORDER — FENTANYL CITRATE 50 UG/ML
25 INJECTION, SOLUTION INTRAMUSCULAR; INTRAVENOUS EVERY 5 MIN PRN
Status: DISCONTINUED | OUTPATIENT
Start: 2023-06-08 | End: 2023-06-08 | Stop reason: HOSPADM

## 2023-06-08 RX ORDER — LIDOCAINE HYDROCHLORIDE 20 MG/ML
INJECTION, SOLUTION EPIDURAL; INFILTRATION; INTRACAUDAL; PERINEURAL PRN
Status: DISCONTINUED | OUTPATIENT
Start: 2023-06-08 | End: 2023-06-08 | Stop reason: SDUPTHER

## 2023-06-08 RX ORDER — HYDROCHLOROTHIAZIDE 25 MG/1
25 TABLET ORAL DAILY
Status: DISCONTINUED | OUTPATIENT
Start: 2023-06-08 | End: 2023-06-10 | Stop reason: HOSPADM

## 2023-06-08 RX ORDER — DEXTROSE, SODIUM CHLORIDE, AND POTASSIUM CHLORIDE 5; .45; .15 G/100ML; G/100ML; G/100ML
INJECTION INTRAVENOUS CONTINUOUS
Status: DISCONTINUED | OUTPATIENT
Start: 2023-06-08 | End: 2023-06-08 | Stop reason: HOSPADM

## 2023-06-08 RX ORDER — LIDOCAINE HYDROCHLORIDE AND EPINEPHRINE 10; 10 MG/ML; UG/ML
INJECTION, SOLUTION INFILTRATION; PERINEURAL PRN
Status: DISCONTINUED | OUTPATIENT
Start: 2023-06-08 | End: 2023-06-08 | Stop reason: HOSPADM

## 2023-06-08 RX ORDER — ACETAMINOPHEN 500 MG
1000 TABLET ORAL ONCE
Status: COMPLETED | OUTPATIENT
Start: 2023-06-08 | End: 2023-06-08

## 2023-06-08 RX ORDER — DEXTROSE MONOHYDRATE 100 MG/ML
INJECTION, SOLUTION INTRAVENOUS CONTINUOUS PRN
Status: DISCONTINUED | OUTPATIENT
Start: 2023-06-08 | End: 2023-06-10 | Stop reason: HOSPADM

## 2023-06-08 RX ORDER — HYDRALAZINE HYDROCHLORIDE 20 MG/ML
10 INJECTION INTRAMUSCULAR; INTRAVENOUS
Status: DISCONTINUED | OUTPATIENT
Start: 2023-06-08 | End: 2023-06-08 | Stop reason: HOSPADM

## 2023-06-08 RX ORDER — CYANOCOBALAMIN 1000 UG/ML
1000 INJECTION, SOLUTION INTRAMUSCULAR; SUBCUTANEOUS
Status: DISCONTINUED | OUTPATIENT
Start: 2023-06-09 | End: 2023-06-10 | Stop reason: HOSPADM

## 2023-06-08 RX ORDER — HYDROMORPHONE HYDROCHLORIDE 1 MG/ML
0.5 INJECTION, SOLUTION INTRAMUSCULAR; INTRAVENOUS; SUBCUTANEOUS EVERY 5 MIN PRN
Status: DISCONTINUED | OUTPATIENT
Start: 2023-06-08 | End: 2023-06-08 | Stop reason: HOSPADM

## 2023-06-08 RX ORDER — AMLODIPINE BESYLATE 5 MG/1
5 TABLET ORAL DAILY
Status: DISCONTINUED | OUTPATIENT
Start: 2023-06-08 | End: 2023-06-08 | Stop reason: SDUPTHER

## 2023-06-08 RX ORDER — ONDANSETRON 2 MG/ML
INJECTION INTRAMUSCULAR; INTRAVENOUS PRN
Status: DISCONTINUED | OUTPATIENT
Start: 2023-06-08 | End: 2023-06-08 | Stop reason: SDUPTHER

## 2023-06-08 RX ORDER — KETAMINE HCL IN NACL, ISO-OSM 100MG/10ML
SYRINGE (ML) INJECTION PRN
Status: DISCONTINUED | OUTPATIENT
Start: 2023-06-08 | End: 2023-06-08 | Stop reason: SDUPTHER

## 2023-06-08 RX ORDER — LIDOCAINE HYDROCHLORIDE 10 MG/ML
1 INJECTION, SOLUTION EPIDURAL; INFILTRATION; INTRACAUDAL; PERINEURAL
Status: DISCONTINUED | OUTPATIENT
Start: 2023-06-08 | End: 2023-06-08 | Stop reason: HOSPADM

## 2023-06-08 RX ORDER — SODIUM CHLORIDE 9 MG/ML
INJECTION, SOLUTION INTRAVENOUS PRN
Status: DISCONTINUED | OUTPATIENT
Start: 2023-06-08 | End: 2023-06-10 | Stop reason: HOSPADM

## 2023-06-08 RX ORDER — PRAVASTATIN SODIUM 40 MG
40 TABLET ORAL
Status: DISCONTINUED | OUTPATIENT
Start: 2023-06-08 | End: 2023-06-10 | Stop reason: HOSPADM

## 2023-06-08 RX ORDER — OXYCODONE HYDROCHLORIDE 5 MG/1
5 TABLET ORAL EVERY 4 HOURS PRN
Status: DISCONTINUED | OUTPATIENT
Start: 2023-06-08 | End: 2023-06-10 | Stop reason: HOSPADM

## 2023-06-08 RX ORDER — CEFAZOLIN SODIUM 1 G/3ML
INJECTION, POWDER, FOR SOLUTION INTRAMUSCULAR; INTRAVENOUS PRN
Status: DISCONTINUED | OUTPATIENT
Start: 2023-06-08 | End: 2023-06-08 | Stop reason: SDUPTHER

## 2023-06-08 RX ORDER — ONDANSETRON 2 MG/ML
4 INJECTION INTRAMUSCULAR; INTRAVENOUS
Status: DISCONTINUED | OUTPATIENT
Start: 2023-06-08 | End: 2023-06-08 | Stop reason: HOSPADM

## 2023-06-08 RX ORDER — PROPOFOL 10 MG/ML
INJECTION, EMULSION INTRAVENOUS PRN
Status: DISCONTINUED | OUTPATIENT
Start: 2023-06-08 | End: 2023-06-08 | Stop reason: SDUPTHER

## 2023-06-08 RX ORDER — CLONIDINE HYDROCHLORIDE 0.1 MG/1
0.1 TABLET ORAL 2 TIMES DAILY
Status: DISCONTINUED | OUTPATIENT
Start: 2023-06-08 | End: 2023-06-10 | Stop reason: HOSPADM

## 2023-06-08 RX ORDER — OXYCODONE HYDROCHLORIDE 5 MG/1
5 TABLET ORAL
Status: DISCONTINUED | OUTPATIENT
Start: 2023-06-08 | End: 2023-06-08 | Stop reason: HOSPADM

## 2023-06-08 RX ORDER — ROCURONIUM BROMIDE 10 MG/ML
INJECTION, SOLUTION INTRAVENOUS PRN
Status: DISCONTINUED | OUTPATIENT
Start: 2023-06-08 | End: 2023-06-08 | Stop reason: SDUPTHER

## 2023-06-08 RX ADMIN — CEFAZOLIN 2 G: 330 INJECTION, POWDER, FOR SOLUTION INTRAMUSCULAR; INTRAVENOUS at 07:58

## 2023-06-08 RX ADMIN — CLONIDINE HYDROCHLORIDE 0.1 MG: 0.1 TABLET ORAL at 21:13

## 2023-06-08 RX ADMIN — METFORMIN HYDROCHLORIDE 1000 MG: 500 TABLET ORAL at 13:28

## 2023-06-08 RX ADMIN — METOPROLOL TARTRATE 100 MG: 50 TABLET ORAL at 21:12

## 2023-06-08 RX ADMIN — LOSARTAN POTASSIUM 50 MG: 50 TABLET, FILM COATED ORAL at 13:28

## 2023-06-08 RX ADMIN — OXYCODONE HYDROCHLORIDE 10 MG: 5 TABLET ORAL at 21:12

## 2023-06-08 RX ADMIN — ACETAMINOPHEN 1000 MG: 500 TABLET ORAL at 06:31

## 2023-06-08 RX ADMIN — LIDOCAINE HYDROCHLORIDE 60 MG: 20 INJECTION, SOLUTION EPIDURAL; INFILTRATION; INTRACAUDAL; PERINEURAL at 07:52

## 2023-06-08 RX ADMIN — ROCURONIUM BROMIDE 5 MG: 10 SOLUTION INTRAVENOUS at 07:52

## 2023-06-08 RX ADMIN — METFORMIN HYDROCHLORIDE 1000 MG: 500 TABLET ORAL at 21:13

## 2023-06-08 RX ADMIN — MIDAZOLAM 1 MG: 1 INJECTION INTRAMUSCULAR; INTRAVENOUS at 07:42

## 2023-06-08 RX ADMIN — PHENYLEPHRINE HYDROCHLORIDE 80 MCG/MIN: 10 INJECTION INTRAVENOUS at 08:10

## 2023-06-08 RX ADMIN — Medication 20 MG: at 07:55

## 2023-06-08 RX ADMIN — FENTANYL CITRATE 25 MCG: 50 INJECTION, SOLUTION INTRAMUSCULAR; INTRAVENOUS at 08:07

## 2023-06-08 RX ADMIN — SODIUM CHLORIDE, PRESERVATIVE FREE 10 ML: 5 INJECTION INTRAVENOUS at 21:14

## 2023-06-08 RX ADMIN — DORZOLAMIDE HCL 1 DROP: 20 SOLUTION/ DROPS OPHTHALMIC at 21:14

## 2023-06-08 RX ADMIN — AMLODIPINE BESYLATE 5 MG: 5 TABLET ORAL at 13:28

## 2023-06-08 RX ADMIN — HYDROCHLOROTHIAZIDE 25 MG: 25 TABLET ORAL at 13:28

## 2023-06-08 RX ADMIN — SODIUM CHLORIDE 250 ML: 9 INJECTION, SOLUTION INTRAVENOUS at 06:43

## 2023-06-08 RX ADMIN — Medication 120 MG: at 07:52

## 2023-06-08 RX ADMIN — PHENYLEPHRINE HYDROCHLORIDE 80 MCG/MIN: 10 INJECTION INTRAVENOUS at 08:19

## 2023-06-08 RX ADMIN — PRAVASTATIN SODIUM 40 MG: 40 TABLET ORAL at 21:13

## 2023-06-08 RX ADMIN — DEXAMETHASONE SODIUM PHOSPHATE 4 MG: 4 INJECTION, SOLUTION INTRAMUSCULAR; INTRAVENOUS at 07:59

## 2023-06-08 RX ADMIN — DORZOLAMIDE HCL 1 DROP: 20 SOLUTION/ DROPS OPHTHALMIC at 13:28

## 2023-06-08 RX ADMIN — PROPOFOL 120 MG: 10 INJECTION, EMULSION INTRAVENOUS at 07:52

## 2023-06-08 RX ADMIN — LATANOPROST 1 DROP: 50 SOLUTION OPHTHALMIC at 22:32

## 2023-06-08 RX ADMIN — MIDAZOLAM 1 MG: 1 INJECTION INTRAMUSCULAR; INTRAVENOUS at 07:43

## 2023-06-08 RX ADMIN — ONDANSETRON HYDROCHLORIDE 4 MG: 2 INJECTION, SOLUTION INTRAMUSCULAR; INTRAVENOUS at 08:27

## 2023-06-08 ASSESSMENT — PAIN SCALES - GENERAL
PAINLEVEL_OUTOF10: 0
PAINLEVEL_OUTOF10: 0
PAINLEVEL_OUTOF10: 7
PAINLEVEL_OUTOF10: 0

## 2023-06-08 ASSESSMENT — PAIN - FUNCTIONAL ASSESSMENT
PAIN_FUNCTIONAL_ASSESSMENT: ACTIVITIES ARE NOT PREVENTED
PAIN_FUNCTIONAL_ASSESSMENT: 0-10

## 2023-06-08 ASSESSMENT — PAIN DESCRIPTION - DESCRIPTORS: DESCRIPTORS: ACHING

## 2023-06-08 ASSESSMENT — PAIN DESCRIPTION - ORIENTATION: ORIENTATION: MID

## 2023-06-08 ASSESSMENT — PAIN DESCRIPTION - LOCATION: LOCATION: ABDOMEN

## 2023-06-08 NOTE — FLOWSHEET NOTE
06/08/23 0857   Closed/Suction Drain Left; Anterior Neck Bulb   Placement Date/Time: 06/08/23 0846   Present on Admission/Arrival: No  Description (optional): 10 fr  Inserted by: Dr. Marcy Doe Number: 1  Orientation: Left; Anterior  Location: Neck  Drain Tube Type: Bulb  Size : 10 fr  Tube Shape: Round   Site Description Other (Comment)  (Large bandaid.  20 mls serosanguinous fluid)   Dressing Status Clean, dry & intact   Drainage Appearance Serosanguinous   Output (ml) 20 ml

## 2023-06-08 NOTE — FLOWSHEET NOTE
06/08/23 0818   Family Communication    Relationship to Patient Son    Phone Number Jodi Delgadillo   [phone]   Family/Significant Other Update Called   Delivery Origin Nurse   Message Disposition Family present - message delivered   Update Given Yes   Family Communication   Family Update Message Procedure started

## 2023-06-08 NOTE — PERIOP NOTE
Blood specimens sent at 08.24 and 0840 hrs respectively. Peripheral IV removed from left anterior foot per Murphy Rueda CRNA.  Faby Land RN

## 2023-06-08 NOTE — H&P
Ears/Nose/Throat History and Physical      History of Present Illness:   Patient is a 68 y.o.  female who is being admitted for elective surgery: has hypercalcemia related to hyperparathyroidism, imaging suggesting right lower mass    Past Medical History:   Diagnosis Date    Arthritis     Diabetes ([address]     NIDDM    Fracture     HTN (hypertension)     Hx of blood clots     Hyperlipemia     Menopause     Sepsis ([facility]       Family History   Problem Relation Age of Onset    Stroke Mother     Diabetes Father     Kidney Disease Father     Cancer Sister     Ovarian Cancer Sister     Cancer Sister         Neck/Head?     Diabetes Brother     Cancer Brother     Other Brother         kidney stones    Cancer Brother     Kidney Disease Brother     Heart Disease Brother     Obesity Son     Anesth Problems Neg Hx       Social History     Tobacco Use    Smoking status: Never     Passive exposure: Never    Smokeless tobacco: Never   Substance Use Topics    Alcohol use: No     Past Surgical History:   Procedure Laterality Date    CARPAL TUNNEL RELEASE Right     CATARACT REMOVAL Bilateral 2012, 2017    CHOLECYSTECTOMY  2015    COLONOSCOPY      FRACTURE SURGERY Right     ARM    LUMBAR DISCECTOMY  1969    UPPER GASTROINTESTINAL ENDOSCOPY        Current Facility-Administered Medications   Medication Dose Route Frequency    lidocaine PF 1 % injection 1 mL  1 mL IntraDERmal Once PRN    fentaNYL (SUBLIMAZE) injection 100 mcg  100 mcg IntraVENous Once PRN    lactated ringers IV soln infusion   IntraVENous Continuous    sodium chloride flush 0.9 % injection 5-40 mL  5-40 mL IntraVENous 2 times per day    sodium chloride flush 0.9 % injection 5-40 mL  5-40 mL IntraVENous PRN    0.9 % sodium chloride infusion   IntraVENous PRN    midazolam PF (VERSED) injection 2 mg  2 mg IntraVENous Once PRN      Allergies   Allergen Reactions    Enalapril Swelling            Objective:     Patient Vitals for the past 8 hrs:   BP Temp Temp src

## 2023-06-08 NOTE — PROGRESS NOTES
0802 peripheral IV access to left anterior foot for blood draw access during case. Inserted by Savage Milton CRNA.  Narda Soares RN

## 2023-06-08 NOTE — FLOWSHEET NOTE
06/08/23 0859   Incision 06/08/23 Throat Anterior   Date First Assessed: 06/08/23   Present on Hospital Admission: No  Location: Throat  Incision Location Orientation: Anterior   Dressing Status Clean;Dry   Dressing/Treatment Steri-strips   Drainage Amount None   Odor None

## 2023-06-08 NOTE — PERIOP NOTE
TRANSFER - OUT REPORT:    Verbal report given to [facility] RN(name) on Ester Grubbs  being transferred to [facility](unit) for post-op       Report consisted of patients Situation, Background, Assessment and   Recommendations(SBAR). Time Pre op antibiotic [id number]  Anesthesia Stop time: 0215    Information from the following report(s) SBAR was reviewed with the receiving nurse. Opportunity for questions and clarification was provided. Is the patient on 02? no       L/Min RA       Other     Is the patient on a monitor? no    Is the nurse transporting with the patient? no    Surgical Waiting Area notified of patient's transfer from PACU?  yes      The following personal items collected during your admission accompanied patient upon transfer:   Dental Appliance:    Vision:    Hearing Aid:    Jewelry:    Clothing:    Other Valuables:    Valuables sent to safe:

## 2023-06-08 NOTE — ANESTHESIA PRE PROCEDURE
Smoking status: Never     Passive exposure: Never    Smokeless tobacco: Never   Substance Use Topics    Alcohol use: No                                Counseling given: Not Answered      Vital Signs (Current):   Vitals:    06/08/23 0613   BP: 136/87   Pulse: 66   Resp: 15   Temp: 98.3 °F (36.8 °C)   TempSrc: Oral   SpO2: 98%   Weight: 83.4 kg (183 lb 13.8 oz)   Height: 1.638 m (5' 4.5\")                                              BP Readings from Last 3 Encounters:   06/08/23 136/87   06/02/23 131/73   05/25/23 (!) 140/80       NPO Status: Time of last liquid consumption: 2300                        Time of last solid consumption: 2300                        Date of last liquid consumption: 06/07/23                        Date of last solid food consumption: 06/07/23    BMI:   Wt Readings from Last 3 Encounters:   06/08/23 83.4 kg (183 lb 13.8 oz)   06/02/23 83.4 kg (183 lb 12.8 oz)   05/25/23 83.5 kg (184 lb)     Body mass index is 31.07 kg/m².     CBC:   Lab Results   Component Value Date/Time    WBC 8.8 05/18/2023 02:22 PM    RBC 4.30 05/18/2023 02:22 PM    HGB 12.2 05/18/2023 02:22 PM    HCT 40.1 05/18/2023 02:22 PM    MCV 93.3 05/18/2023 02:22 PM    RDW 13.3 05/18/2023 02:22 PM     05/18/2023 02:22 PM       CMP:   Lab Results   Component Value Date/Time     05/18/2023 02:22 PM    K 3.6 05/18/2023 02:22 PM     05/18/2023 02:22 PM    CO2 27 05/18/2023 02:22 PM    BUN 22 05/18/2023 02:22 PM    CREATININE 1.07 05/18/2023 02:22 PM    GFRAA >60 08/30/2022 11:21 AM    AGRATIO 1.3 08/30/2022 11:21 AM    LABGLOM 55 05/18/2023 02:22 PM    GLUCOSE 91 05/18/2023 02:22 PM    PROT 7.2 05/18/2023 02:22 PM    CALCIUM 10.6 05/18/2023 02:22 PM    BILITOT 0.3 05/18/2023 02:22 PM    ALKPHOS 67 05/18/2023 02:22 PM    ALKPHOS 94 08/30/2022 11:21 AM    AST 13 05/18/2023 02:22 PM    ALT 16 05/18/2023 02:22 PM       POC Tests:   Recent Labs     06/08/23  0641   POCGLU 174*       Coags: No results found for:

## 2023-06-08 NOTE — ANESTHESIA POSTPROCEDURE EVALUATION
Department of Anesthesiology  Postprocedure Note    Patient: Luc Moeller  MRN: [id number]  YOB: 1950  Date of evaluation: 6/8/2023      Procedure Summary     Date: 06/08/23 Room / Location: [facility] MAIN OR 03 / [facility] MAIN OR    Anesthesia Start: 6069 Anesthesia Stop: 6606    Procedure: PARATHYROIDECTOMY (Neck) Diagnosis:       Primary hyperparathyroidism ([address]      Parathyroid related hypercalcemia ([address]      (Primary hyperparathyroidism ([address] [E21.0])      (Parathyroid related hypercalcemia ([address] [E83.52, E21.5])    Providers: Connor Medley MD Responsible Provider: Conor Louise MD    Anesthesia Type: General ASA Status: 2          Anesthesia Type: General    Evangelist Phase I: Evangelist Score: 8    Evangelist Phase II:        Anesthesia Post Evaluation    Patient location during evaluation: PACU  Level of consciousness: awake  Airway patency: patent  Nausea & Vomiting: no nausea  Complications: no  Cardiovascular status: blood pressure returned to baseline  Respiratory status: acceptable  Hydration status: euvolemic  Comments: ---------------------------               06/08/23                      0914         ---------------------------   BP:                         Pulse:                      Resp:                       Temp:   97.7 °F (36.5 °C)   SpO2:                      ---------------------------

## 2023-06-09 VITALS
SYSTOLIC BLOOD PRESSURE: 145 MMHG | BODY MASS INDEX: 30.63 KG/M2 | HEART RATE: 67 BPM | RESPIRATION RATE: 17 BRPM | HEIGHT: 65 IN | DIASTOLIC BLOOD PRESSURE: 77 MMHG | WEIGHT: 183.86 LBS | TEMPERATURE: 98.5 F | OXYGEN SATURATION: 96 %

## 2023-06-09 LAB
GLUCOSE BLD STRIP.AUTO-MCNC: 122 MG/DL (ref 65–117)
GLUCOSE BLD STRIP.AUTO-MCNC: 129 MG/DL (ref 65–117)
GLUCOSE BLD STRIP.AUTO-MCNC: 144 MG/DL (ref 65–117)
SERVICE CMNT-IMP: ABNORMAL

## 2023-06-09 PROCEDURE — 6360000002 HC RX W HCPCS: Performed by: OTOLARYNGOLOGY

## 2023-06-09 PROCEDURE — G0378 HOSPITAL OBSERVATION PER HR: HCPCS

## 2023-06-09 PROCEDURE — 82962 GLUCOSE BLOOD TEST: CPT

## 2023-06-09 PROCEDURE — 2580000003 HC RX 258: Performed by: OTOLARYNGOLOGY

## 2023-06-09 PROCEDURE — 6370000000 HC RX 637 (ALT 250 FOR IP): Performed by: OTOLARYNGOLOGY

## 2023-06-09 RX ORDER — OXYCODONE HYDROCHLORIDE AND ACETAMINOPHEN 5; 325 MG/1; MG/1
1 TABLET ORAL EVERY 6 HOURS PRN
Qty: 28 TABLET | Refills: 0 | Status: SHIPPED | OUTPATIENT
Start: 2023-06-09 | End: 2023-06-16

## 2023-06-09 RX ADMIN — METFORMIN HYDROCHLORIDE 1000 MG: 500 TABLET ORAL at 09:29

## 2023-06-09 RX ADMIN — GLIPIZIDE 5 MG: 5 TABLET ORAL at 06:37

## 2023-06-09 RX ADMIN — FERROUS SULFATE TAB 325 MG (65 MG ELEMENTAL FE) 325 MG: 325 (65 FE) TAB at 09:24

## 2023-06-09 RX ADMIN — HYDROCHLOROTHIAZIDE 25 MG: 25 TABLET ORAL at 09:25

## 2023-06-09 RX ADMIN — CLONIDINE HYDROCHLORIDE 0.1 MG: 0.1 TABLET ORAL at 09:24

## 2023-06-09 RX ADMIN — SODIUM CHLORIDE, PRESERVATIVE FREE 10 ML: 5 INJECTION INTRAVENOUS at 09:00

## 2023-06-09 RX ADMIN — METOPROLOL TARTRATE 100 MG: 50 TABLET ORAL at 09:24

## 2023-06-09 RX ADMIN — AMLODIPINE BESYLATE 5 MG: 5 TABLET ORAL at 09:24

## 2023-06-09 RX ADMIN — DORZOLAMIDE HCL 1 DROP: 20 SOLUTION/ DROPS OPHTHALMIC at 09:25

## 2023-06-09 RX ADMIN — LOSARTAN POTASSIUM 50 MG: 50 TABLET, FILM COATED ORAL at 09:24

## 2023-06-09 RX ADMIN — OXYCODONE HYDROCHLORIDE 10 MG: 5 TABLET ORAL at 17:01

## 2023-06-09 RX ADMIN — CYANOCOBALAMIN 1000 MCG: 1000 INJECTION, SOLUTION INTRAMUSCULAR; SUBCUTANEOUS at 09:30

## 2023-06-09 ASSESSMENT — PAIN DESCRIPTION - ORIENTATION: ORIENTATION: ANTERIOR;MID

## 2023-06-09 ASSESSMENT — PAIN DESCRIPTION - DESCRIPTORS: DESCRIPTORS: SORE;ACHING

## 2023-06-09 ASSESSMENT — PAIN DESCRIPTION - LOCATION: LOCATION: THROAT

## 2023-06-09 ASSESSMENT — PAIN SCALES - GENERAL: PAINLEVEL_OUTOF10: 8

## 2023-06-09 NOTE — CARE COORDINATION
Care Management Initial Assessment       RUR: Observation   Readmission? No  1st IM letter given? N/A  1st  letter given: N/A       06/09/23 5464   Service Assessment   Patient Orientation Alert and Oriented;Person;Place;Situation;Self   Cognition Alert   History Provided By Patient   Primary Caregiver Self   Accompanied By/Relationship Son   Support Systems Spouse/Significant Other;Children   Patient's Healthcare Decision Maker is: Legal Next of Manisha 69   PCP Verified by CM Yes  (Dr. Hitesh Smith)   Last Visit to PCP Within last 3 months  (Seen within past week per patient/son)   Prior Functional Level Independent in ADLs/IADLs   Current Functional Level Independent in ADLs/IADLs   Can patient return to prior living arrangement Yes   Ability to make needs known: Good   Family able to assist with home care needs: Yes   Would you like for me to discuss the discharge plan with any other family members/significant others, and if so, who?  Yes  (Yes, patient's son; Tomas Cohn)   Financial Resources [employer] None   Social/Functional History   Lives With Spouse   Type of [address] One level   Home Equipment [place], [address] Help From Family  (Son provides assistance when needed)   ADL Assistance Independent   Homemaking Assistance Independent   Ambulation Assistance Independent   Transfer Assistance Independent   Discharge Planning   Type of [address] Medications No   Patient expects to be discharged to: [facility], [facility]   [phone]

## 2023-06-09 NOTE — PLAN OF CARE
Problem: Pain  Goal: Verbalizes/displays adequate comfort level or baseline comfort level  Outcome: Progressing  Flowsheets (Taken 6/8/2023 2112)  Verbalizes/displays adequate comfort level or baseline comfort level:   Encourage patient to monitor pain and request assistance   Assess pain using appropriate pain scale   Administer analgesics based on type and severity of pain and evaluate response   Implement non-pharmacological measures as appropriate and evaluate response     Problem: Safety - Adult  Goal: Free from fall injury  Outcome: Progressing

## 2023-06-09 NOTE — OP NOTE
[address]   OPERATIVE REPORT    Name:  Ava Angelucci  MR#:  [id number]  :  1950  ACCOUNT #:  [de-identified]  DATE OF SERVICE:  2023    PREOPERATIVE DIAGNOSES:  1. Primary hyperparathyroidism. 2.  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 POSTOPERATIVE DIAGNOSES:  1. Primary hyperparathyroidism. 2.  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 PROCEDURE PERFORMED:  Parathyroid exploration, bilateral.    SURGEON:  Maritza Ugarte MD.    ASSISTANT:  Surgical assistant. ANESTHESIA:  General endotracheal tube anesthesia. COMPLICATIONS:  No complications. SPECIMENS REMOVED:  Right inferior parathyroid mass. IMPLANTS:  No implant. ESTIMATED BLOOD LOSS:  10 mL. INDICATIONS AND FINDINGS:  The patient had primary hyperparathyroidism by metabolic testing, imaging suggesting a right inferior mass. Intraoperative parathyroid hormone testing dropped from approximately 140 to 40, suggesting complete excision of overactive parathyroid problem. Parathyroid was identified as a bilobed, dumbbell-shaped tumor, a lima bean size, being outside of the thyroid gland and an intrathyroidal portion, of a similar size and two parathyroid masses were excised, though these may have been connected in a dumbbell-type fashion. The parathyroid anatomy appeared normal.    PROCEDURE:  In the operating room, the patient was induced under general endotracheal tube anesthesia following which she was prepped and draped in a sterile fashion. Lidocaine 1% with 1:100,000 part epinephrine was used for local infiltration. A 4-cm incision was made in the lower cervical rhytid, elevating superior and inferior subplatysmal skin flaps retracted with silk sutures. Strap muscles were divided in the midline exposing the thyroid gland. An extracapsular dissection was performed on both sides to expose the thyroid gland itself.   The superior gland was identified at the deep superior pole that was

## 2023-06-09 NOTE — PROGRESS NOTES
While assessing patient this RN noticed purwick container was empty, RN asked patient did she urinate over night or does she need to urinate, patient response was \"no I don't feel like I need to go\". RN asked patient was she wet, patient replied \"no\". This RN decided to bladder scan patient, bladder scan showed 65 ml. RN requested for CNA to ambulate patient to see if it would help with getting things moving. While attempted to ambulate patient CNA noted that patient and her linens were wet. Patient had more than 1 incontinent episode and was unaware.

## 2023-06-09 NOTE — DISCHARGE INSTR - DIET

## 2023-06-10 NOTE — PROGRESS NOTES
[address] paged Dr. Jeremy Denny office to have provider complete the discharge medication reconciliation which needed to be completed in order for the AVS to be printed. MD On Call  responded to page and let this RN know that he did not have access to complete the discharge medication reconciliation. MD stated \" the patient is okay to go and Dr. Ferny Goldman will have to fix this when he gets back. \"  This RN and Mireya Bryan RN reviewed all orders. Patient discharged to home with OLAMIDE Drain  per the only active order of closed suction drain. Patient will follow up with Dr. Ferny Goldman in his office. Drain care instructions reviewed and demonstrated with patient. Copy of AVS provided as well as instructions attached regarding post thyroidectomy care. 2015-Patient alert and oriented x4 at time of discharge. IV discontinued. Discharge instructions and medications reviewed with patient and son. Patient and son verbalized understanding. Opportunity for questions and clarification provided. Patient discharged to home escorted to entrance in wheelchair by PCT.

## 2023-07-11 NOTE — TELEPHONE ENCOUNTER
Duplicate request: Cozaar 50 mg  #90 with 3 refills was sent to [address] [place] [address] on 05/05/2023. Last appointment: 05/25/2023 MD Ravinder Penny   Next appointment: 08/18/2023 MD Ravinder Penny   Previous refill encounter(s):   06/28/2022 Norvasc #90 with 3 refills,   02/07/2023 FeroSul #30 with 3 refills. For Pharmacy Admin Tracking Only    Program: Medication Refill  Intervention Detail: Discontinued Rx: 1, reason: Duplicate Therapy and New Rx: 2, reason: Patient Preference  Time Spent (min): 5      Requested Prescriptions     Pending Prescriptions Disp Refills    amLODIPine (NORVASC) 5 MG tablet 90 tablet 0     Sig: Take 1 tablet by mouth daily    ferrous sulfate (IRON 325) 325 (65 Fe) MG tablet 90 tablet 0     Sig: Take 1 tablet by mouth daily     ----- Message from Fixational Jack sent at 7/11/2023  1:24 PM EDT -----  Subject: Refill Request    QUESTIONS  Name of Medication? amLODIPine (NORVASC) 5 MG tablet  Patient-reported dosage and instructions? 5 mg once daily  How many days do you have left? 5  Preferred Pharmacy? [facility] phone number (if available)? [phone]  ---------------------------------------------------------------------------  --------------,  Name of Medication? ferrous sulfate (IRON 325) 325 (65 Fe) MG tablet  Patient-reported dosage and instructions? once daily  How many days do you have left? 3  Preferred Pharmacy? [facility] phone number (if available)? [phone]  ---------------------------------------------------------------------------  --------------,  Name of Medication? losartan (COZAAR) 50 MG tablet  Patient-reported dosage and instructions? once a day  How many days do you have left? 6  Preferred Pharmacy?  [facility]  Pharmacy phone number (if available)? [phone]  ---------------------------------------------------------------------------  --------------  CALL BACK INFO  What is the best way for the office to

## 2023-07-12 RX ORDER — AMLODIPINE BESYLATE 5 MG/1
5 TABLET ORAL DAILY
Qty: 90 TABLET | Refills: 0 | Status: SHIPPED | OUTPATIENT
Start: 2023-07-12

## 2023-07-12 RX ORDER — FERROUS SULFATE 325(65) MG
325 TABLET ORAL DAILY
Qty: 90 TABLET | Refills: 0 | Status: SHIPPED | OUTPATIENT
Start: 2023-07-12

## 2023-07-22 RX ORDER — MELOXICAM 15 MG/1
TABLET ORAL
Qty: 90 TABLET | Refills: 3 | Status: SHIPPED | OUTPATIENT
Start: 2023-07-22

## 2023-08-09 RX ORDER — AMLODIPINE BESYLATE 5 MG/1
TABLET ORAL
Qty: 90 TABLET | OUTPATIENT
Start: 2023-08-09

## 2023-08-09 RX ORDER — FERROUS SULFATE 325(65) MG
TABLET ORAL
Qty: 30 TABLET | OUTPATIENT
Start: 2023-08-09

## 2023-08-13 RX ORDER — METOPROLOL TARTRATE 100 MG/1
TABLET ORAL
Qty: 60 TABLET | Refills: 3 | Status: SHIPPED | OUTPATIENT
Start: 2023-08-13

## 2023-08-18 ENCOUNTER — OFFICE VISIT (OUTPATIENT)
Facility: CLINIC | Age: 73
End: 2023-08-18
Payer: MEDICARE

## 2023-08-18 VITALS
RESPIRATION RATE: 16 BRPM | HEART RATE: 67 BPM | HEIGHT: 65 IN | BODY MASS INDEX: 30.99 KG/M2 | OXYGEN SATURATION: 98 % | DIASTOLIC BLOOD PRESSURE: 84 MMHG | SYSTOLIC BLOOD PRESSURE: 132 MMHG | WEIGHT: 186 LBS | TEMPERATURE: 98 F

## 2023-08-18 DIAGNOSIS — E78.00 HYPERCHOLESTEREMIA: ICD-10-CM

## 2023-08-18 DIAGNOSIS — E21.0 PRIMARY HYPERPARATHYROIDISM (HCC): ICD-10-CM

## 2023-08-18 DIAGNOSIS — I10 ESSENTIAL (PRIMARY) HYPERTENSION: ICD-10-CM

## 2023-08-18 DIAGNOSIS — E11.49 TYPE 2 DIABETES MELLITUS WITH OTHER DIABETIC NEUROLOGICAL COMPLICATION (HCC): ICD-10-CM

## 2023-08-18 DIAGNOSIS — G56.01 CARPAL TUNNEL SYNDROME, RIGHT UPPER LIMB: Primary | ICD-10-CM

## 2023-08-18 PROCEDURE — G8417 CALC BMI ABV UP PARAM F/U: HCPCS | Performed by: INTERNAL MEDICINE

## 2023-08-18 PROCEDURE — 3017F COLORECTAL CA SCREEN DOC REV: CPT | Performed by: INTERNAL MEDICINE

## 2023-08-18 PROCEDURE — G8427 DOCREV CUR MEDS BY ELIG CLIN: HCPCS | Performed by: INTERNAL MEDICINE

## 2023-08-18 PROCEDURE — 1036F TOBACCO NON-USER: CPT | Performed by: INTERNAL MEDICINE

## 2023-08-18 PROCEDURE — 3044F HG A1C LEVEL LT 7.0%: CPT | Performed by: INTERNAL MEDICINE

## 2023-08-18 PROCEDURE — 2022F DILAT RTA XM EVC RTNOPTHY: CPT | Performed by: INTERNAL MEDICINE

## 2023-08-18 PROCEDURE — 1090F PRES/ABSN URINE INCON ASSESS: CPT | Performed by: INTERNAL MEDICINE

## 2023-08-18 PROCEDURE — 3075F SYST BP GE 130 - 139MM HG: CPT | Performed by: INTERNAL MEDICINE

## 2023-08-18 PROCEDURE — 3079F DIAST BP 80-89 MM HG: CPT | Performed by: INTERNAL MEDICINE

## 2023-08-18 PROCEDURE — 1123F ACP DISCUSS/DSCN MKR DOCD: CPT | Performed by: INTERNAL MEDICINE

## 2023-08-18 PROCEDURE — 99214 OFFICE O/P EST MOD 30 MIN: CPT | Performed by: INTERNAL MEDICINE

## 2023-08-18 PROCEDURE — G8399 PT W/DXA RESULTS DOCUMENT: HCPCS | Performed by: INTERNAL MEDICINE

## 2023-08-18 RX ORDER — FERROUS SULFATE 325(65) MG
325 TABLET ORAL DAILY
Qty: 90 TABLET | Refills: 0 | Status: SHIPPED | OUTPATIENT
Start: 2023-08-18

## 2023-08-18 RX ORDER — METOPROLOL TARTRATE 100 MG/1
100 TABLET ORAL 2 TIMES DAILY
Qty: 60 TABLET | Refills: 3 | Status: SHIPPED | OUTPATIENT
Start: 2023-08-18

## 2023-08-18 ASSESSMENT — PATIENT HEALTH QUESTIONNAIRE - PHQ9
SUM OF ALL RESPONSES TO PHQ QUESTIONS 1-9: 0
2. FEELING DOWN, DEPRESSED OR HOPELESS: 0
SUM OF ALL RESPONSES TO PHQ QUESTIONS 1-9: 0
1. LITTLE INTEREST OR PLEASURE IN DOING THINGS: 0
SUM OF ALL RESPONSES TO PHQ9 QUESTIONS 1 & 2: 0

## 2023-08-18 NOTE — PROGRESS NOTES
Aimee  is a 68 y.o. female and presents with Diabetes, Hypertension, and Cholesterol Problem  . Subjective:  Carpal Tunnel Syndrome Review:  Patient presents for evaluation of pain in  rt.hand(s), hand paresthesias of the first three fingers. Onset of the symptoms was months ago. Current symptoms include tingling and weakness involving the medial aspect of the bilateral hand(s): severity: moderate. Patient's overall course: gradually worsening. Patient has had no prior elbow problems. Previous visits for this problem: none. Evaluation to date: none. Treatment to date: steroid injection    Hypertension Review:  The patient has essential hypertension  Diet and Lifestyle: generally follows a  low sodium diet, exercises sporadically  Home BP Monitoring: is not measured at home. Pertinent ROS: taking medications as instructed, no medication side effects noted, no TIA's, no chest pain on exertion, no dyspnea on exertion, no swelling of ankles. Dyslipidemia Review:  Patient presents for evaluation of lipids. Compliance with treatment thus far has been excellent. A repeat fasting lipid profile was not done. The patient does not use medications that may worsen dyslipidemias (corticosteroids, progestins, anabolic steroids, amiodarone, cyclosporine, olanzapine). The patient exercises some    Diabetes Mellitus Review:  She has diabetes mellitus. Diabetic ROS - medication compliance: compliant all of the time, diabetic diet compliance: compliant all of the time, home glucose monitoring: is performed.    Known diabetic complications: none  Cardiovascular risk factors: family history, dyslipidemia, diabetes mellitus, obesity, hypertension  Current diabetic medications include oral agents   Eye exam current (within one year): no  Weight trend: stable  Prior visit with dietician: no  Current diet: \"healthy\" diet  in general  Current exercise: walking  Current monitoring regimen: home blood tests - daily  Home

## 2023-10-09 RX ORDER — AMLODIPINE BESYLATE 5 MG/1
5 TABLET ORAL DAILY
Qty: 90 TABLET | Refills: 0 | Status: SHIPPED | OUTPATIENT
Start: 2023-10-09

## 2023-10-09 NOTE — TELEPHONE ENCOUNTER
Last appointment: 08/18/2023 MD Seamus Holt   Next appointment: 11/20/2023 MD Seamus Holt   Previous refill encounter(s):   07/12/2023 Norvasc #90     For Pharmacy Admin Tracking Only    Program: Medication Refill  Intervention Detail: New Rx: 1, reason: Patient Preference  Time Spent (min): 5      Requested Prescriptions     Pending Prescriptions Disp Refills    amLODIPine (NORVASC) 5 MG tablet [Pharmacy Med Name: AMLODIPINE BESYLATE 5MG TABLETS] 90 tablet 0     Sig: TAKE 1 TABLET BY MOUTH DAILY

## 2023-10-13 RX ORDER — FERROUS SULFATE 325(65) MG
1 TABLET ORAL DAILY
Qty: 90 TABLET | Refills: 0 | OUTPATIENT
Start: 2023-10-13

## 2023-11-20 ENCOUNTER — OFFICE VISIT (OUTPATIENT)
Facility: CLINIC | Age: 73
End: 2023-11-20
Payer: MEDICARE

## 2023-11-20 VITALS
WEIGHT: 178 LBS | OXYGEN SATURATION: 98 % | HEART RATE: 65 BPM | TEMPERATURE: 98 F | DIASTOLIC BLOOD PRESSURE: 86 MMHG | BODY MASS INDEX: 29.66 KG/M2 | HEIGHT: 65 IN | SYSTOLIC BLOOD PRESSURE: 150 MMHG | RESPIRATION RATE: 16 BRPM

## 2023-11-20 DIAGNOSIS — Z23 NEEDS FLU SHOT: Primary | ICD-10-CM

## 2023-11-20 DIAGNOSIS — I10 ESSENTIAL (PRIMARY) HYPERTENSION: ICD-10-CM

## 2023-11-20 DIAGNOSIS — E78.00 HYPERCHOLESTEREMIA: ICD-10-CM

## 2023-11-20 DIAGNOSIS — E11.49 TYPE 2 DIABETES MELLITUS WITH OTHER DIABETIC NEUROLOGICAL COMPLICATION (HCC): ICD-10-CM

## 2023-11-20 LAB
ALBUMIN SERPL-MCNC: 3.8 G/DL (ref 3.5–5)
ALBUMIN/GLOB SERPL: 1.2 (ref 1.1–2.2)
ALP SERPL-CCNC: 63 U/L (ref 45–117)
ALT SERPL-CCNC: 14 U/L (ref 12–78)
ANION GAP SERPL CALC-SCNC: 7 MMOL/L (ref 5–15)
AST SERPL-CCNC: 12 U/L (ref 15–37)
BILIRUB SERPL-MCNC: 0.3 MG/DL (ref 0.2–1)
BUN SERPL-MCNC: 35 MG/DL (ref 6–20)
BUN/CREAT SERPL: 28 (ref 12–20)
CALCIUM SERPL-MCNC: 9.3 MG/DL (ref 8.5–10.1)
CHLORIDE SERPL-SCNC: 107 MMOL/L (ref 97–108)
CHOLEST SERPL-MCNC: 142 MG/DL
CO2 SERPL-SCNC: 28 MMOL/L (ref 21–32)
CREAT SERPL-MCNC: 1.27 MG/DL (ref 0.55–1.02)
ERYTHROCYTE [DISTWIDTH] IN BLOOD BY AUTOMATED COUNT: 13.5 % (ref 11.5–14.5)
GLOBULIN SER CALC-MCNC: 3.3 G/DL (ref 2–4)
GLUCOSE SERPL-MCNC: 73 MG/DL (ref 65–100)
HCT VFR BLD AUTO: 37 % (ref 35–47)
HDLC SERPL-MCNC: 49 MG/DL
HDLC SERPL: 2.9 (ref 0–5)
HGB BLD-MCNC: 11.5 G/DL (ref 11.5–16)
LDLC SERPL CALC-MCNC: 75.2 MG/DL (ref 0–100)
MCH RBC QN AUTO: 28.5 PG (ref 26–34)
MCHC RBC AUTO-ENTMCNC: 31.1 G/DL (ref 30–36.5)
MCV RBC AUTO: 91.6 FL (ref 80–99)
NRBC # BLD: 0 K/UL (ref 0–0.01)
NRBC BLD-RTO: 0 PER 100 WBC
PLATELET # BLD AUTO: 225 K/UL (ref 150–400)
PMV BLD AUTO: 12.1 FL (ref 8.9–12.9)
POTASSIUM SERPL-SCNC: 3.9 MMOL/L (ref 3.5–5.1)
PROT SERPL-MCNC: 7.1 G/DL (ref 6.4–8.2)
RBC # BLD AUTO: 4.04 M/UL (ref 3.8–5.2)
SODIUM SERPL-SCNC: 142 MMOL/L (ref 136–145)
TRIGL SERPL-MCNC: 89 MG/DL
VLDLC SERPL CALC-MCNC: 17.8 MG/DL
WBC # BLD AUTO: 7.6 K/UL (ref 3.6–11)

## 2023-11-20 PROCEDURE — G8428 CUR MEDS NOT DOCUMENT: HCPCS | Performed by: INTERNAL MEDICINE

## 2023-11-20 PROCEDURE — 1036F TOBACCO NON-USER: CPT | Performed by: INTERNAL MEDICINE

## 2023-11-20 PROCEDURE — G8482 FLU IMMUNIZE ORDER/ADMIN: HCPCS | Performed by: INTERNAL MEDICINE

## 2023-11-20 PROCEDURE — 90674 CCIIV4 VAC NO PRSV 0.5 ML IM: CPT | Performed by: INTERNAL MEDICINE

## 2023-11-20 PROCEDURE — 99214 OFFICE O/P EST MOD 30 MIN: CPT | Performed by: INTERNAL MEDICINE

## 2023-11-20 PROCEDURE — G8399 PT W/DXA RESULTS DOCUMENT: HCPCS | Performed by: INTERNAL MEDICINE

## 2023-11-20 PROCEDURE — 1090F PRES/ABSN URINE INCON ASSESS: CPT | Performed by: INTERNAL MEDICINE

## 2023-11-20 PROCEDURE — 1123F ACP DISCUSS/DSCN MKR DOCD: CPT | Performed by: INTERNAL MEDICINE

## 2023-11-20 PROCEDURE — 3017F COLORECTAL CA SCREEN DOC REV: CPT | Performed by: INTERNAL MEDICINE

## 2023-11-20 PROCEDURE — G8417 CALC BMI ABV UP PARAM F/U: HCPCS | Performed by: INTERNAL MEDICINE

## 2023-11-20 PROCEDURE — G0008 ADMIN INFLUENZA VIRUS VAC: HCPCS | Performed by: INTERNAL MEDICINE

## 2023-11-20 RX ORDER — GLIPIZIDE 5 MG/1
5 TABLET, FILM COATED, EXTENDED RELEASE ORAL DAILY
Qty: 30 TABLET | Refills: 13 | Status: SHIPPED | OUTPATIENT
Start: 2023-11-20

## 2023-11-20 RX ORDER — AMLODIPINE BESYLATE 5 MG/1
5 TABLET ORAL DAILY
Qty: 90 TABLET | Refills: 0 | Status: SHIPPED | OUTPATIENT
Start: 2023-11-20

## 2023-11-20 RX ORDER — FERROUS SULFATE 325(65) MG
325 TABLET ORAL DAILY
Qty: 90 TABLET | Refills: 0 | Status: SHIPPED | OUTPATIENT
Start: 2023-11-20

## 2023-11-20 SDOH — ECONOMIC STABILITY: TRANSPORTATION INSECURITY
IN THE PAST 12 MONTHS, HAS THE LACK OF TRANSPORTATION KEPT YOU FROM MEDICAL APPOINTMENTS OR FROM GETTING MEDICATIONS?: NO

## 2023-11-20 SDOH — ECONOMIC STABILITY: INCOME INSECURITY: IN THE LAST 12 MONTHS, WAS THERE A TIME WHEN YOU WERE NOT ABLE TO PAY THE MORTGAGE OR RENT ON TIME?: NO

## 2023-11-20 SDOH — ECONOMIC STABILITY: INCOME INSECURITY: HOW HARD IS IT FOR YOU TO PAY FOR THE VERY BASICS LIKE FOOD, HOUSING, MEDICAL CARE, AND HEATING?: NOT HARD AT ALL

## 2023-11-20 SDOH — ECONOMIC STABILITY: TRANSPORTATION INSECURITY
IN THE PAST 12 MONTHS, HAS LACK OF TRANSPORTATION KEPT YOU FROM MEETINGS, WORK, OR FROM GETTING THINGS NEEDED FOR DAILY LIVING?: NO

## 2023-12-19 RX ORDER — SERTRALINE HYDROCHLORIDE 25 MG/1
TABLET, FILM COATED ORAL
Qty: 30 TABLET | Refills: 3 | OUTPATIENT
Start: 2023-12-19

## 2023-12-26 RX ORDER — SERTRALINE HYDROCHLORIDE 25 MG/1
TABLET, FILM COATED ORAL
Qty: 30 TABLET | Refills: 3 | OUTPATIENT
Start: 2023-12-26

## 2024-01-09 NOTE — TELEPHONE ENCOUNTER
Requested Prescriptions     Pending Prescriptions Disp Refills    amLODIPine (NORVASC) 5 MG tablet [Pharmacy Med Name: AMLODIPINE BESYLATE 5MG TABLETS] 90 tablet 0     Sig: TAKE 1 TABLET BY MOUTH DAILY

## 2024-01-10 RX ORDER — AMLODIPINE BESYLATE 5 MG/1
5 TABLET ORAL DAILY
Qty: 90 TABLET | Refills: 0 | Status: SHIPPED | OUTPATIENT
Start: 2024-01-10

## 2024-02-02 RX ORDER — CLONIDINE HYDROCHLORIDE 0.1 MG/1
0.1 TABLET ORAL 2 TIMES DAILY
Qty: 180 TABLET | Refills: 0 | Status: SHIPPED | OUTPATIENT
Start: 2024-02-02

## 2024-02-02 NOTE — TELEPHONE ENCOUNTER
Last appointment: 11/20/2023 MD Van   Next appointment: 02/22/2024 MD Van   Previous refill encounter(s):   02/06/2023 Catapres #180 with 3 refills.     For Pharmacy Admin Tracking Only    Program: Medication Refill  Intervention Detail: New Rx: 1, reason: Patient Preference  Time Spent (min): 5    Requested Prescriptions     Pending Prescriptions Disp Refills    cloNIDine (CATAPRES) 0.1 MG tablet [Pharmacy Med Name: CLONIDINE 0.1MG TABLETS] 180 tablet 0     Sig: TAKE 1 TABLET BY MOUTH TWICE DAILY

## 2024-02-09 RX ORDER — PRAVASTATIN SODIUM 40 MG
40 TABLET ORAL DAILY
Qty: 90 TABLET | Refills: 1 | Status: SHIPPED | OUTPATIENT
Start: 2024-02-09

## 2024-02-09 RX ORDER — LANOLIN ALCOHOL/MO/W.PET/CERES
1 CREAM (GRAM) TOPICAL DAILY
Qty: 90 TABLET | Refills: 0 | Status: SHIPPED | OUTPATIENT
Start: 2024-02-09

## 2024-02-22 ENCOUNTER — OFFICE VISIT (OUTPATIENT)
Facility: CLINIC | Age: 74
End: 2024-02-22
Payer: MEDICARE

## 2024-02-22 VITALS
RESPIRATION RATE: 16 BRPM | TEMPERATURE: 98 F | BODY MASS INDEX: 30.16 KG/M2 | SYSTOLIC BLOOD PRESSURE: 150 MMHG | DIASTOLIC BLOOD PRESSURE: 86 MMHG | HEIGHT: 65 IN | OXYGEN SATURATION: 98 % | HEART RATE: 68 BPM | WEIGHT: 181 LBS

## 2024-02-22 DIAGNOSIS — E11.49 TYPE 2 DIABETES MELLITUS WITH OTHER DIABETIC NEUROLOGICAL COMPLICATION (HCC): ICD-10-CM

## 2024-02-22 DIAGNOSIS — Z00.00 MEDICARE ANNUAL WELLNESS VISIT, SUBSEQUENT: ICD-10-CM

## 2024-02-22 DIAGNOSIS — I10 ESSENTIAL (PRIMARY) HYPERTENSION: Primary | ICD-10-CM

## 2024-02-22 DIAGNOSIS — E78.00 HYPERCHOLESTEREMIA: ICD-10-CM

## 2024-02-22 PROCEDURE — 3017F COLORECTAL CA SCREEN DOC REV: CPT | Performed by: INTERNAL MEDICINE

## 2024-02-22 PROCEDURE — 1123F ACP DISCUSS/DSCN MKR DOCD: CPT | Performed by: INTERNAL MEDICINE

## 2024-02-22 PROCEDURE — 1036F TOBACCO NON-USER: CPT | Performed by: INTERNAL MEDICINE

## 2024-02-22 PROCEDURE — 2022F DILAT RTA XM EVC RTNOPTHY: CPT | Performed by: INTERNAL MEDICINE

## 2024-02-22 PROCEDURE — G8399 PT W/DXA RESULTS DOCUMENT: HCPCS | Performed by: INTERNAL MEDICINE

## 2024-02-22 PROCEDURE — G0439 PPPS, SUBSEQ VISIT: HCPCS | Performed by: INTERNAL MEDICINE

## 2024-02-22 PROCEDURE — 99214 OFFICE O/P EST MOD 30 MIN: CPT | Performed by: INTERNAL MEDICINE

## 2024-02-22 PROCEDURE — G8428 CUR MEDS NOT DOCUMENT: HCPCS | Performed by: INTERNAL MEDICINE

## 2024-02-22 PROCEDURE — G8482 FLU IMMUNIZE ORDER/ADMIN: HCPCS | Performed by: INTERNAL MEDICINE

## 2024-02-22 PROCEDURE — 3077F SYST BP >= 140 MM HG: CPT | Performed by: INTERNAL MEDICINE

## 2024-02-22 PROCEDURE — 1090F PRES/ABSN URINE INCON ASSESS: CPT | Performed by: INTERNAL MEDICINE

## 2024-02-22 PROCEDURE — 3079F DIAST BP 80-89 MM HG: CPT | Performed by: INTERNAL MEDICINE

## 2024-02-22 PROCEDURE — 3046F HEMOGLOBIN A1C LEVEL >9.0%: CPT | Performed by: INTERNAL MEDICINE

## 2024-02-22 PROCEDURE — G8417 CALC BMI ABV UP PARAM F/U: HCPCS | Performed by: INTERNAL MEDICINE

## 2024-02-22 RX ORDER — SERTRALINE HYDROCHLORIDE 25 MG/1
25 TABLET, FILM COATED ORAL DAILY
Qty: 30 TABLET | Refills: 3 | Status: SHIPPED | OUTPATIENT
Start: 2024-02-22

## 2024-02-22 ASSESSMENT — PATIENT HEALTH QUESTIONNAIRE - PHQ9
2. FEELING DOWN, DEPRESSED OR HOPELESS: 0
SUM OF ALL RESPONSES TO PHQ QUESTIONS 1-9: 0
1. LITTLE INTEREST OR PLEASURE IN DOING THINGS: 0
SUM OF ALL RESPONSES TO PHQ9 QUESTIONS 1 & 2: 0

## 2024-02-22 ASSESSMENT — LIFESTYLE VARIABLES
HOW MANY STANDARD DRINKS CONTAINING ALCOHOL DO YOU HAVE ON A TYPICAL DAY: PATIENT DOES NOT DRINK
HOW OFTEN DO YOU HAVE A DRINK CONTAINING ALCOHOL: NEVER

## 2024-02-22 NOTE — PATIENT INSTRUCTIONS
Learning About Being Active as an Older Adult  Why is being active important as you get older?     Being active is one of the best things you can do for your health. And it's never too late to start. Being active--or getting active, if you aren't already--has definite benefits. It can:  Give you more energy,  Keep your mind sharp.  Improve balance to reduce your risk of falls.  Help you manage chronic illness with fewer medicines.  No matter how old you are, how fit you are, or what health problems you have, there is a form of activity that will work for you. And the more physical activity you can do, the better your overall health will be.  What kinds of activity can help you stay healthy?  Being more active will make your daily activities easier. Physical activity includes planned exercise and things you do in daily life. There are four types of activity:  Aerobic.  Doing aerobic activity makes your heart and lungs strong.  Includes walking, dancing, and gardening.  Aim for at least 2½ hours spread throughout the week.  It improves your energy and can help you sleep better.  Muscle-strengthening.  This type of activity can help maintain muscle and strengthen bones.  Includes climbing stairs, using resistance bands, and lifting or carrying heavy loads.  Aim for at least twice a week.  It can help protect the knees and other joints.  Stretching.  Stretching gives you better range of motion in joints and muscles.  Includes upper arm stretches, calf stretches, and gentle yoga.  Aim for at least twice a week, preferably after your muscles are warmed up from other activities.  It can help you function better in daily life.  Balancing.  This helps you stay coordinated and have good posture.  Includes heel-to-toe walking, valentín chi, and certain types of yoga.  Aim for at least 3 days a week.  It can reduce your risk of falling.  Even if you have a hard time meeting the recommendations, it's better to be more active

## 2024-02-22 NOTE — PROGRESS NOTES
1. Have you been to the ER, urgent care clinic since your last visit?  Hospitalized since your last visit?No    2. Have you seen or consulted any other health care providers outside of the [facility] since your last visit?  Include any pap smears or colon screening. No     Chief Complaint   Patient presents with    Diabetes    Medicare AWV    Hypertension    Cholesterol Problem         PHQ-9 Total Score: 0 (2/22/2024 10:35 AM)

## 2024-02-22 NOTE — PROGRESS NOTES
Medicare Annual Wellness Visit    Piedad Fabian is here for Diabetes, Medicare AWV, Hypertension, and Cholesterol Problem    Assessment & Plan   Essential (primary) hypertension  Hypercholesteremia  Type 2 diabetes mellitus with other diabetic neurological complication (HCC)    Recommendations for Preventive Services Due: see orders and patient instructions/AVS.  Recommended screening schedule for the next 5-10 years is provided to the patient in written form: see Patient Instructions/AVS.     No follow-ups on file.     Subjective   Hypertension Review:  The patient has hypertension .  Diet and Lifestyle: generally follows a low sodium diet, exercises sporadically  Home BP Monitoring: is not measured at home.  Pertinent ROS: taking medications as instructed, no medication side effects noted, no TIA's, no chest pain on exertion, no dyspnea on exertion, no swelling of ankles.    Dyslipidemia Review:  Patient presents for evaluation of lipids.  Compliance with treatment thus far has been excellent.  A repeat fasting lipid profile was not done.  The patient does not use medications that may worsen dyslipidemias . The patient exercises sporadically.      Diabetes Mellitus Review:  She has diabetes mellitus.  Diabetic ROS - medication compliance: compliant all of the time, diabetic diet compliance: compliant all of the time, home glucose monitoring: is performed.   Known diabetic complications: none  Cardiovascular risk factors: family history, dyslipidemia, diabetes mellitus, obesity, hypertension  Current diabetic medications include oral agents/  Eye exam current (within one year): no  Weight trend: stable  Prior visit with dietician: no  Current diet: \"healthy\" diet  in general  Current exercise: walking  Current monitoring regimen: home blood tests - daily  Home blood sugar records: trend: stable  Any episodes of hypoglycemia? no  Lab review: orders written for new lab studies as appropriate; see orders.

## 2024-02-23 LAB
ALBUMIN SERPL-MCNC: 3.7 G/DL (ref 3.5–5)
ALBUMIN/GLOB SERPL: 1.2 (ref 1.1–2.2)
ALP SERPL-CCNC: 79 U/L (ref 45–117)
ALT SERPL-CCNC: 13 U/L (ref 12–78)
ANION GAP SERPL CALC-SCNC: 4 MMOL/L (ref 5–15)
AST SERPL-CCNC: 8 U/L (ref 15–37)
BILIRUB SERPL-MCNC: 0.3 MG/DL (ref 0.2–1)
BUN SERPL-MCNC: 24 MG/DL (ref 6–20)
BUN/CREAT SERPL: 22 (ref 12–20)
CALCIUM SERPL-MCNC: 9.7 MG/DL (ref 8.5–10.1)
CHLORIDE SERPL-SCNC: 109 MMOL/L (ref 97–108)
CO2 SERPL-SCNC: 30 MMOL/L (ref 21–32)
CREAT SERPL-MCNC: 1.07 MG/DL (ref 0.55–1.02)
ERYTHROCYTE [DISTWIDTH] IN BLOOD BY AUTOMATED COUNT: 13.2 % (ref 11.5–14.5)
GLOBULIN SER CALC-MCNC: 3.1 G/DL (ref 2–4)
GLUCOSE SERPL-MCNC: 153 MG/DL (ref 65–100)
HCT VFR BLD AUTO: 36.8 % (ref 35–47)
HGB BLD-MCNC: 11.9 G/DL (ref 11.5–16)
MCH RBC QN AUTO: 29.1 PG (ref 26–34)
MCHC RBC AUTO-ENTMCNC: 32.3 G/DL (ref 30–36.5)
MCV RBC AUTO: 90 FL (ref 80–99)
NRBC # BLD: 0 K/UL (ref 0–0.01)
NRBC BLD-RTO: 0 PER 100 WBC
PLATELET # BLD AUTO: 220 K/UL (ref 150–400)
PMV BLD AUTO: 12 FL (ref 8.9–12.9)
POTASSIUM SERPL-SCNC: 4.1 MMOL/L (ref 3.5–5.1)
PROT SERPL-MCNC: 6.8 G/DL (ref 6.4–8.2)
RBC # BLD AUTO: 4.09 M/UL (ref 3.8–5.2)
SODIUM SERPL-SCNC: 143 MMOL/L (ref 136–145)
WBC # BLD AUTO: 7 K/UL (ref 3.6–11)

## 2024-03-11 RX ORDER — HYDROCHLOROTHIAZIDE 25 MG/1
25 TABLET ORAL DAILY
Qty: 90 TABLET | Refills: 3 | Status: SHIPPED | OUTPATIENT
Start: 2024-03-11

## 2024-04-29 RX ORDER — CLONIDINE HYDROCHLORIDE 0.1 MG/1
0.1 TABLET ORAL 2 TIMES DAILY
Qty: 180 TABLET | Refills: 0 | Status: SHIPPED | OUTPATIENT
Start: 2024-04-29

## 2024-04-29 NOTE — TELEPHONE ENCOUNTER
Last appointment: 02/22/2024 MD Van   Next appointment: 05/22/2024 MD Van   Previous refill encounter(s):   02/02/2024 Catapres #180     For Pharmacy Admin Tracking Only    Program: Medication Refill  Intervention Detail: New Rx: 1, reason: Patient Preference  Time Spent (min): 5    Requested Prescriptions     Pending Prescriptions Disp Refills    cloNIDine (CATAPRES) 0.1 MG tablet [Pharmacy Med Name: CLONIDINE 0.1MG TABLETS] 180 tablet 0     Sig: TAKE 1 TABLET BY MOUTH TWICE DAILY

## 2024-05-08 RX ORDER — UREA 10 %
1 LOTION (ML) TOPICAL DAILY
Qty: 90 TABLET | Refills: 0 | Status: SHIPPED | OUTPATIENT
Start: 2024-05-08

## 2024-05-08 NOTE — TELEPHONE ENCOUNTER
Last appointment: 02/22/2024 MD Van   Next appointment: 05/22/2024 MD Van   Previous refill encounter(s):   04/25/2023 Glucophage #180 with 3 refills,   02/09/2024 Ferrous Sulfate #90.     For Pharmacy Admin Tracking Only    Program: Medication Refill  Intervention Detail: New Rx: 2, reason: Patient Preference  Time Spent (min): 5    Requested Prescriptions     Pending Prescriptions Disp Refills    metFORMIN (GLUCOPHAGE) 1000 MG tablet [Pharmacy Med Name: METFORMIN 1000MG TABLETS] 180 tablet 0     Sig: TAKE 1 TABLET BY MOUTH TWICE DAILY    ferrous sulfate (FE TABS 325) 325 (65 Fe) MG EC tablet [Pharmacy Med Name: FERROUS SULFATE 325MG EC TABS RED] 90 tablet 0     Sig: TAKE 1 TABLET BY MOUTH DAILY

## 2024-05-22 ENCOUNTER — OFFICE VISIT (OUTPATIENT)
Facility: CLINIC | Age: 74
End: 2024-05-22
Payer: MEDICARE

## 2024-05-22 VITALS
DIASTOLIC BLOOD PRESSURE: 80 MMHG | RESPIRATION RATE: 19 BRPM | OXYGEN SATURATION: 99 % | WEIGHT: 189 LBS | BODY MASS INDEX: 31.49 KG/M2 | SYSTOLIC BLOOD PRESSURE: 150 MMHG | HEART RATE: 72 BPM | HEIGHT: 65 IN | TEMPERATURE: 98 F

## 2024-05-22 DIAGNOSIS — E21.0 PRIMARY HYPERPARATHYROIDISM (HCC): ICD-10-CM

## 2024-05-22 DIAGNOSIS — F33.1 MAJOR DEPRESSIVE DISORDER, RECURRENT, MODERATE (HCC): ICD-10-CM

## 2024-05-22 DIAGNOSIS — E11.49 TYPE 2 DIABETES MELLITUS WITH OTHER DIABETIC NEUROLOGICAL COMPLICATION (HCC): ICD-10-CM

## 2024-05-22 DIAGNOSIS — I10 ESSENTIAL (PRIMARY) HYPERTENSION: Primary | ICD-10-CM

## 2024-05-22 DIAGNOSIS — Z12.11 SCREENING FOR COLON CANCER: ICD-10-CM

## 2024-05-22 DIAGNOSIS — E78.00 HYPERCHOLESTEREMIA: ICD-10-CM

## 2024-05-22 DIAGNOSIS — F33.42 RECURRENT MAJOR DEPRESSIVE DISORDER, IN FULL REMISSION (HCC): ICD-10-CM

## 2024-05-22 LAB
ALBUMIN SERPL-MCNC: 3.8 G/DL (ref 3.5–5)
ALBUMIN/GLOB SERPL: 1.2 (ref 1.1–2.2)
ALP SERPL-CCNC: 72 U/L (ref 45–117)
ALT SERPL-CCNC: 15 U/L (ref 12–78)
ANION GAP SERPL CALC-SCNC: 4 MMOL/L (ref 5–15)
AST SERPL-CCNC: 13 U/L (ref 15–37)
BILIRUB SERPL-MCNC: 0.4 MG/DL (ref 0.2–1)
BUN SERPL-MCNC: 37 MG/DL (ref 6–20)
BUN/CREAT SERPL: 32 (ref 12–20)
CALCIUM SERPL-MCNC: 9.9 MG/DL (ref 8.5–10.1)
CHLORIDE SERPL-SCNC: 108 MMOL/L (ref 97–108)
CO2 SERPL-SCNC: 29 MMOL/L (ref 21–32)
CREAT SERPL-MCNC: 1.16 MG/DL (ref 0.55–1.02)
CREAT UR-MCNC: 116 MG/DL
ERYTHROCYTE [DISTWIDTH] IN BLOOD BY AUTOMATED COUNT: 12.9 % (ref 11.5–14.5)
GLOBULIN SER CALC-MCNC: 3.1 G/DL (ref 2–4)
GLUCOSE SERPL-MCNC: 106 MG/DL (ref 65–100)
GLUCOSE, POC: 121 MG/DL
HBA1C MFR BLD: 7.3 %
HCT VFR BLD AUTO: 35.9 % (ref 35–47)
HGB BLD-MCNC: 11.5 G/DL (ref 11.5–16)
MCH RBC QN AUTO: 28.3 PG (ref 26–34)
MCHC RBC AUTO-ENTMCNC: 32 G/DL (ref 30–36.5)
MCV RBC AUTO: 88.4 FL (ref 80–99)
MICROALBUMIN UR-MCNC: 1.96 MG/DL
MICROALBUMIN/CREAT UR-RTO: 17 MG/G (ref 0–30)
NRBC # BLD: 0 K/UL (ref 0–0.01)
NRBC BLD-RTO: 0 PER 100 WBC
PLATELET # BLD AUTO: 236 K/UL (ref 150–400)
PMV BLD AUTO: 12.1 FL (ref 8.9–12.9)
POTASSIUM SERPL-SCNC: 3.9 MMOL/L (ref 3.5–5.1)
PROT SERPL-MCNC: 6.9 G/DL (ref 6.4–8.2)
RBC # BLD AUTO: 4.06 M/UL (ref 3.8–5.2)
SODIUM SERPL-SCNC: 141 MMOL/L (ref 136–145)
WBC # BLD AUTO: 8.3 K/UL (ref 3.6–11)

## 2024-05-22 PROCEDURE — 1090F PRES/ABSN URINE INCON ASSESS: CPT | Performed by: INTERNAL MEDICINE

## 2024-05-22 PROCEDURE — 3017F COLORECTAL CA SCREEN DOC REV: CPT | Performed by: INTERNAL MEDICINE

## 2024-05-22 PROCEDURE — 1123F ACP DISCUSS/DSCN MKR DOCD: CPT | Performed by: INTERNAL MEDICINE

## 2024-05-22 PROCEDURE — G8417 CALC BMI ABV UP PARAM F/U: HCPCS | Performed by: INTERNAL MEDICINE

## 2024-05-22 PROCEDURE — 83036 HEMOGLOBIN GLYCOSYLATED A1C: CPT | Performed by: INTERNAL MEDICINE

## 2024-05-22 PROCEDURE — 82962 GLUCOSE BLOOD TEST: CPT | Performed by: INTERNAL MEDICINE

## 2024-05-22 PROCEDURE — 3079F DIAST BP 80-89 MM HG: CPT | Performed by: INTERNAL MEDICINE

## 2024-05-22 PROCEDURE — 2022F DILAT RTA XM EVC RTNOPTHY: CPT | Performed by: INTERNAL MEDICINE

## 2024-05-22 PROCEDURE — G9899 SCRN MAM PERF RSLTS DOC: HCPCS | Performed by: INTERNAL MEDICINE

## 2024-05-22 PROCEDURE — 3046F HEMOGLOBIN A1C LEVEL >9.0%: CPT | Performed by: INTERNAL MEDICINE

## 2024-05-22 PROCEDURE — G8427 DOCREV CUR MEDS BY ELIG CLIN: HCPCS | Performed by: INTERNAL MEDICINE

## 2024-05-22 PROCEDURE — G8399 PT W/DXA RESULTS DOCUMENT: HCPCS | Performed by: INTERNAL MEDICINE

## 2024-05-22 PROCEDURE — 99214 OFFICE O/P EST MOD 30 MIN: CPT | Performed by: INTERNAL MEDICINE

## 2024-05-22 PROCEDURE — 3077F SYST BP >= 140 MM HG: CPT | Performed by: INTERNAL MEDICINE

## 2024-05-22 PROCEDURE — 1036F TOBACCO NON-USER: CPT | Performed by: INTERNAL MEDICINE

## 2024-05-22 RX ORDER — AMLODIPINE BESYLATE 5 MG/1
5 TABLET ORAL DAILY
Qty: 90 TABLET | Refills: 3 | Status: SHIPPED | OUTPATIENT
Start: 2024-05-22

## 2024-05-22 SDOH — ECONOMIC STABILITY: FOOD INSECURITY: WITHIN THE PAST 12 MONTHS, THE FOOD YOU BOUGHT JUST DIDN'T LAST AND YOU DIDN'T HAVE MONEY TO GET MORE.: NEVER TRUE

## 2024-05-22 SDOH — ECONOMIC STABILITY: FOOD INSECURITY: WITHIN THE PAST 12 MONTHS, YOU WORRIED THAT YOUR FOOD WOULD RUN OUT BEFORE YOU GOT MONEY TO BUY MORE.: NEVER TRUE

## 2024-05-22 SDOH — ECONOMIC STABILITY: INCOME INSECURITY: HOW HARD IS IT FOR YOU TO PAY FOR THE VERY BASICS LIKE FOOD, HOUSING, MEDICAL CARE, AND HEATING?: NOT VERY HARD

## 2024-05-22 ASSESSMENT — PATIENT HEALTH QUESTIONNAIRE - PHQ9
5. POOR APPETITE OR OVEREATING: NOT AT ALL
3. TROUBLE FALLING OR STAYING ASLEEP: NOT AT ALL
7. TROUBLE CONCENTRATING ON THINGS, SUCH AS READING THE NEWSPAPER OR WATCHING TELEVISION: NOT AT ALL
SUM OF ALL RESPONSES TO PHQ QUESTIONS 1-9: 0
2. FEELING DOWN, DEPRESSED OR HOPELESS: NOT AT ALL
9. THOUGHTS THAT YOU WOULD BE BETTER OFF DEAD, OR OF HURTING YOURSELF: NOT AT ALL
SUM OF ALL RESPONSES TO PHQ QUESTIONS 1-9: 0
4. FEELING TIRED OR HAVING LITTLE ENERGY: NOT AT ALL
1. LITTLE INTEREST OR PLEASURE IN DOING THINGS: NOT AT ALL
SUM OF ALL RESPONSES TO PHQ QUESTIONS 1-9: 0
10. IF YOU CHECKED OFF ANY PROBLEMS, HOW DIFFICULT HAVE THESE PROBLEMS MADE IT FOR YOU TO DO YOUR WORK, TAKE CARE OF THINGS AT HOME, OR GET ALONG WITH OTHER PEOPLE: NOT DIFFICULT AT ALL
SUM OF ALL RESPONSES TO PHQ QUESTIONS 1-9: 0
SUM OF ALL RESPONSES TO PHQ9 QUESTIONS 1 & 2: 0
6. FEELING BAD ABOUT YOURSELF - OR THAT YOU ARE A FAILURE OR HAVE LET YOURSELF OR YOUR FAMILY DOWN: NOT AT ALL
8. MOVING OR SPEAKING SO SLOWLY THAT OTHER PEOPLE COULD HAVE NOTICED. OR THE OPPOSITE, BEING SO FIGETY OR RESTLESS THAT YOU HAVE BEEN MOVING AROUND A LOT MORE THAN USUAL: NOT AT ALL

## 2024-05-22 ASSESSMENT — ANXIETY QUESTIONNAIRES
5. BEING SO RESTLESS THAT IT IS HARD TO SIT STILL: NOT AT ALL
IF YOU CHECKED OFF ANY PROBLEMS ON THIS QUESTIONNAIRE, HOW DIFFICULT HAVE THESE PROBLEMS MADE IT FOR YOU TO DO YOUR WORK, TAKE CARE OF THINGS AT HOME, OR GET ALONG WITH OTHER PEOPLE: NOT DIFFICULT AT ALL
3. WORRYING TOO MUCH ABOUT DIFFERENT THINGS: NOT AT ALL
2. NOT BEING ABLE TO STOP OR CONTROL WORRYING: NOT AT ALL
7. FEELING AFRAID AS IF SOMETHING AWFUL MIGHT HAPPEN: NOT AT ALL
4. TROUBLE RELAXING: NOT AT ALL
6. BECOMING EASILY ANNOYED OR IRRITABLE: NOT AT ALL
1. FEELING NERVOUS, ANXIOUS, OR ON EDGE: NOT AT ALL
GAD7 TOTAL SCORE: 0

## 2024-05-22 NOTE — PROGRESS NOTES
Piedad Fabian is a 74 y.o. female and presents with Hypertension and Diabetes  .  Subjective:    Hypertension Review:  The patient has hypertension .  Diet and Lifestyle: generally follows a low sodium diet, exercises sporadically  Home BP Monitoring: is not measured at home.  Pertinent ROS: taking medications as instructed, no medication side effects noted, no TIA's, no chest pain on exertion, no dyspnea on exertion, no swelling of ankles.    Dyslipidemia Review:  Patient presents for evaluation of lipids.  Compliance with treatment thus far has been excellent.  A repeat fasting lipid profile was not done.  The patient does not use medications that may worsen dyslipidemias . The patient exercises sporadically.      Diabetes Mellitus Review:  She has diabetes mellitus.  Diabetic ROS - medication compliance: compliant all of the time, diabetic diet compliance: compliant all of the time, home glucose monitoring: is performed.   Known diabetic complications: none  Cardiovascular risk factors: family history, dyslipidemia, diabetes mellitus, obesity, hypertension  Current diabetic medications include oral agents/i  Eye exam current (within one year): no  Weight trend: stable  Prior visit with dietician: no  Current diet: \"healthy\" diet  in general  Current exercise: walking  Current monitoring regimen: home blood tests - daily  Home blood sugar records: trend: stable  Any episodes of hypoglycemia? no  Lab review: orders written for new lab studies as appropriate; see orders.       She states her depression/anxiety has resolved    Anemia  Patient presents for  evaluation of anemia. Anemia was found by routine CBC.  It has been present for several months.  Associated signs & symptoms: fatigue        Review of Systems  Constitutional: negative for fevers, chills, anorexia and weight loss  Eyes:   negative for visual disturbance and irritation  ENT:   negative for tinnitus,sore throat,nasal congestion,ear

## 2024-05-22 NOTE — PATIENT INSTRUCTIONS
Thank you for enrolling in [facility]. Please follow the instructions below to securely access your online medical record. [facility] allows you to send messages to your doctor, view your test results, renew your prescriptions, schedule appointments, and more.     How Do I Sign Up?  In your Internet browser, go to [email or web address]  Click on the Sign Up Now link in the Sign In box. You will see the New Member Sign Up page.  Enter your [facility] Access Code exactly as it appears below. You will not need to use this code after you’ve completed the sign-up process. If you do not sign up before the expiration date, you must request a new code.  [facility] Access Code: Activation code not generated  Current [facility] Status: Patient Declined    Enter your Social Security Number ([id number]) and Date of Birth (mm/dd/yyyy) as indicated and click Submit. You will be taken to the next sign-up page.  Create a [facility] ID. This will be your [facility] login ID and cannot be changed, so think of one that is secure and easy to remember.  Create a [facility] password. You can change your password at any time.  Enter your Password Reset Question and Answer. This can be used at a later time if you forget your password.   Enter your e-mail address. You will receive e-mail notification when new information is available in [facility].  Click Sign Up. You can now view your medical record.     Additional Information  If you have questions, please contact your physician practice where you receive care. Remember, [facility] is NOT to be used for urgent needs. For medical emergencies, dial 911.

## 2024-06-05 LAB — HEMOCCULT STL QL IA: NEGATIVE

## 2024-07-16 RX ORDER — LOSARTAN POTASSIUM 50 MG/1
50 TABLET ORAL DAILY
Qty: 90 TABLET | Refills: 3 | Status: SHIPPED | OUTPATIENT
Start: 2024-07-16

## 2024-08-07 RX ORDER — CLONIDINE HYDROCHLORIDE 0.1 MG/1
0.1 TABLET ORAL 2 TIMES DAILY
Qty: 180 TABLET | Refills: 0 | Status: SHIPPED | OUTPATIENT
Start: 2024-08-07

## 2024-08-07 RX ORDER — MELOXICAM 15 MG/1
15 TABLET ORAL DAILY
Qty: 90 TABLET | Refills: 3 | Status: SHIPPED | OUTPATIENT
Start: 2024-08-07

## 2024-08-07 RX ORDER — PRAVASTATIN SODIUM 40 MG
40 TABLET ORAL DAILY
Qty: 90 TABLET | Refills: 1 | Status: SHIPPED | OUTPATIENT
Start: 2024-08-07

## 2024-08-13 ENCOUNTER — OFFICE VISIT (OUTPATIENT)
Facility: CLINIC | Age: 74
End: 2024-08-13
Payer: MEDICARE

## 2024-08-13 VITALS
OXYGEN SATURATION: 99 % | BODY MASS INDEX: 30.99 KG/M2 | TEMPERATURE: 97.4 F | DIASTOLIC BLOOD PRESSURE: 70 MMHG | HEART RATE: 63 BPM | SYSTOLIC BLOOD PRESSURE: 138 MMHG | HEIGHT: 65 IN | RESPIRATION RATE: 16 BRPM | WEIGHT: 186 LBS

## 2024-08-13 DIAGNOSIS — K21.9 GASTROESOPHAGEAL REFLUX DISEASE, UNSPECIFIED WHETHER ESOPHAGITIS PRESENT: ICD-10-CM

## 2024-08-13 DIAGNOSIS — E11.49 TYPE 2 DIABETES MELLITUS WITH OTHER DIABETIC NEUROLOGICAL COMPLICATION (HCC): ICD-10-CM

## 2024-08-13 DIAGNOSIS — R05.2 SUBACUTE COUGH: ICD-10-CM

## 2024-08-13 DIAGNOSIS — E78.00 HYPERCHOLESTEREMIA: ICD-10-CM

## 2024-08-13 DIAGNOSIS — B37.2 CANDIDAL INTERTRIGO: Primary | ICD-10-CM

## 2024-08-13 DIAGNOSIS — I10 ESSENTIAL (PRIMARY) HYPERTENSION: ICD-10-CM

## 2024-08-13 LAB
EXP DATE SOLUTION: NORMAL
EXP DATE SWAB: NORMAL
EXPIRATION DATE: NORMAL
GLUCOSE, POC: 113 MG/DL
LOT NUMBER POC: NORMAL
LOT NUMBER SOLUTION: NORMAL
LOT NUMBER SWAB: NORMAL
SARS-COV-2 RNA, POC: NEGATIVE

## 2024-08-13 PROCEDURE — 2022F DILAT RTA XM EVC RTNOPTHY: CPT | Performed by: INTERNAL MEDICINE

## 2024-08-13 PROCEDURE — 3046F HEMOGLOBIN A1C LEVEL >9.0%: CPT | Performed by: INTERNAL MEDICINE

## 2024-08-13 PROCEDURE — 1036F TOBACCO NON-USER: CPT | Performed by: INTERNAL MEDICINE

## 2024-08-13 PROCEDURE — G8427 DOCREV CUR MEDS BY ELIG CLIN: HCPCS | Performed by: INTERNAL MEDICINE

## 2024-08-13 PROCEDURE — G9899 SCRN MAM PERF RSLTS DOC: HCPCS | Performed by: INTERNAL MEDICINE

## 2024-08-13 PROCEDURE — 1090F PRES/ABSN URINE INCON ASSESS: CPT | Performed by: INTERNAL MEDICINE

## 2024-08-13 PROCEDURE — 3075F SYST BP GE 130 - 139MM HG: CPT | Performed by: INTERNAL MEDICINE

## 2024-08-13 PROCEDURE — G8417 CALC BMI ABV UP PARAM F/U: HCPCS | Performed by: INTERNAL MEDICINE

## 2024-08-13 PROCEDURE — 87635 SARS-COV-2 COVID-19 AMP PRB: CPT | Performed by: INTERNAL MEDICINE

## 2024-08-13 PROCEDURE — 3078F DIAST BP <80 MM HG: CPT | Performed by: INTERNAL MEDICINE

## 2024-08-13 PROCEDURE — 3017F COLORECTAL CA SCREEN DOC REV: CPT | Performed by: INTERNAL MEDICINE

## 2024-08-13 PROCEDURE — 99214 OFFICE O/P EST MOD 30 MIN: CPT | Performed by: INTERNAL MEDICINE

## 2024-08-13 PROCEDURE — G8399 PT W/DXA RESULTS DOCUMENT: HCPCS | Performed by: INTERNAL MEDICINE

## 2024-08-13 PROCEDURE — 1123F ACP DISCUSS/DSCN MKR DOCD: CPT | Performed by: INTERNAL MEDICINE

## 2024-08-13 PROCEDURE — 82962 GLUCOSE BLOOD TEST: CPT | Performed by: INTERNAL MEDICINE

## 2024-08-13 RX ORDER — CLOTRIMAZOLE AND BETAMETHASONE DIPROPIONATE 10; .64 MG/G; MG/G
CREAM TOPICAL
Qty: 45 G | Refills: 3 | Status: SHIPPED | OUTPATIENT
Start: 2024-08-13

## 2024-08-13 RX ORDER — FLUCONAZOLE 100 MG/1
100 TABLET ORAL DAILY
Qty: 14 TABLET | Refills: 0 | Status: SHIPPED | OUTPATIENT
Start: 2024-08-13 | End: 2024-08-27

## 2024-08-13 SDOH — ECONOMIC STABILITY: INCOME INSECURITY: HOW HARD IS IT FOR YOU TO PAY FOR THE VERY BASICS LIKE FOOD, HOUSING, MEDICAL CARE, AND HEATING?: SOMEWHAT HARD

## 2024-08-13 SDOH — ECONOMIC STABILITY: FOOD INSECURITY: WITHIN THE PAST 12 MONTHS, YOU WORRIED THAT YOUR FOOD WOULD RUN OUT BEFORE YOU GOT MONEY TO BUY MORE.: NEVER TRUE

## 2024-08-13 SDOH — ECONOMIC STABILITY: FOOD INSECURITY: WITHIN THE PAST 12 MONTHS, THE FOOD YOU BOUGHT JUST DIDN'T LAST AND YOU DIDN'T HAVE MONEY TO GET MORE.: NEVER TRUE

## 2024-08-13 ASSESSMENT — ANXIETY QUESTIONNAIRES
4. TROUBLE RELAXING: NOT AT ALL
1. FEELING NERVOUS, ANXIOUS, OR ON EDGE: NOT AT ALL
3. WORRYING TOO MUCH ABOUT DIFFERENT THINGS: NOT AT ALL
2. NOT BEING ABLE TO STOP OR CONTROL WORRYING: NOT AT ALL
GAD7 TOTAL SCORE: 0
IF YOU CHECKED OFF ANY PROBLEMS ON THIS QUESTIONNAIRE, HOW DIFFICULT HAVE THESE PROBLEMS MADE IT FOR YOU TO DO YOUR WORK, TAKE CARE OF THINGS AT HOME, OR GET ALONG WITH OTHER PEOPLE: NOT DIFFICULT AT ALL
6. BECOMING EASILY ANNOYED OR IRRITABLE: NOT AT ALL
5. BEING SO RESTLESS THAT IT IS HARD TO SIT STILL: NOT AT ALL

## 2024-08-13 ASSESSMENT — PATIENT HEALTH QUESTIONNAIRE - PHQ9
5. POOR APPETITE OR OVEREATING: NOT AT ALL
SUM OF ALL RESPONSES TO PHQ QUESTIONS 1-9: 0
9. THOUGHTS THAT YOU WOULD BE BETTER OFF DEAD, OR OF HURTING YOURSELF: NOT AT ALL
3. TROUBLE FALLING OR STAYING ASLEEP: NOT AT ALL
8. MOVING OR SPEAKING SO SLOWLY THAT OTHER PEOPLE COULD HAVE NOTICED. OR THE OPPOSITE, BEING SO FIGETY OR RESTLESS THAT YOU HAVE BEEN MOVING AROUND A LOT MORE THAN USUAL: NOT AT ALL
1. LITTLE INTEREST OR PLEASURE IN DOING THINGS: NOT AT ALL
SUM OF ALL RESPONSES TO PHQ QUESTIONS 1-9: 0
SUM OF ALL RESPONSES TO PHQ9 QUESTIONS 1 & 2: 0
4. FEELING TIRED OR HAVING LITTLE ENERGY: NOT AT ALL
2. FEELING DOWN, DEPRESSED OR HOPELESS: NOT AT ALL
10. IF YOU CHECKED OFF ANY PROBLEMS, HOW DIFFICULT HAVE THESE PROBLEMS MADE IT FOR YOU TO DO YOUR WORK, TAKE CARE OF THINGS AT HOME, OR GET ALONG WITH OTHER PEOPLE: NOT DIFFICULT AT ALL
SUM OF ALL RESPONSES TO PHQ QUESTIONS 1-9: 0
6. FEELING BAD ABOUT YOURSELF - OR THAT YOU ARE A FAILURE OR HAVE LET YOURSELF OR YOUR FAMILY DOWN: NOT AT ALL
7. TROUBLE CONCENTRATING ON THINGS, SUCH AS READING THE NEWSPAPER OR WATCHING TELEVISION: NOT AT ALL
SUM OF ALL RESPONSES TO PHQ QUESTIONS 1-9: 0

## 2024-08-13 NOTE — PROGRESS NOTES
Piedad Fabian is a 74 y.o. female and presents with Rash and Breast Problem (itching)  .  Subjective:    She has a diffuse rash and itching under both breasts    GERD Review:   Patient has a history of gastroesophageal reflux with heartburn. Symptoms have been present for a few months.  She denies dysphagia.  She  has not lost weight.  She denies melena, hematochezia, hematemesis, and coffee ground emesis.  This has been associated with fullness after meals.  She denies abdominal bloating and none.  Medical therapy in the past has included proton pump inhibitor      Hypertension Review:  The patient has hypertension .  Diet and Lifestyle: generally follows a low sodium diet, exercises sporadically  Home BP Monitoring: is not measured at home.  Pertinent ROS: taking medications as instructed, no medication side effects noted, no TIA's, no chest pain on exertion, no dyspnea on exertion, no swelling of ankles.    Dyslipidemia Review:  Patient presents for evaluation of lipids.  Compliance with treatment thus far has been excellent.  A repeat fasting lipid profile was not done.  The patient does not use medications that may worsen dyslipidemias . The patient exercises sporadically.      Diabetes Mellitus Review:  She has diabetes mellitus.  Diabetic ROS - medication compliance: compliant all of the time, diabetic diet compliance: compliant all of the time, home glucose monitoring: is performed.   Known diabetic complications: none  Cardiovascular risk factors: family history, dyslipidemia, diabetes mellitus, obesity, hypertension  Current diabetic medications include oral agents/  Eye exam current (within one year): no  Weight trend: stable  Prior visit with dietician: no  Current diet: \"healthy\" diet  in general  Current exercise: walking  Current monitoring regimen: home blood tests - daily  Home blood sugar records: trend: stable  Any episodes of hypoglycemia? no  Lab review: orders written for new lab studies as

## 2024-08-14 LAB
ALBUMIN SERPL-MCNC: 3.7 G/DL (ref 3.5–5)
ALBUMIN/GLOB SERPL: 1.2 (ref 1.1–2.2)
ALP SERPL-CCNC: 83 U/L (ref 45–117)
ALT SERPL-CCNC: 14 U/L (ref 12–78)
ANION GAP SERPL CALC-SCNC: 6 MMOL/L (ref 5–15)
AST SERPL-CCNC: 12 U/L (ref 15–37)
BILIRUB SERPL-MCNC: 0.4 MG/DL (ref 0.2–1)
BUN SERPL-MCNC: 24 MG/DL (ref 6–20)
BUN/CREAT SERPL: 20 (ref 12–20)
CALCIUM SERPL-MCNC: 9.9 MG/DL (ref 8.5–10.1)
CHLORIDE SERPL-SCNC: 108 MMOL/L (ref 97–108)
CHOLEST SERPL-MCNC: 150 MG/DL
CO2 SERPL-SCNC: 27 MMOL/L (ref 21–32)
CREAT SERPL-MCNC: 1.22 MG/DL (ref 0.55–1.02)
ERYTHROCYTE [DISTWIDTH] IN BLOOD BY AUTOMATED COUNT: 13 % (ref 11.5–14.5)
GLOBULIN SER CALC-MCNC: 3.1 G/DL (ref 2–4)
GLUCOSE SERPL-MCNC: 113 MG/DL (ref 65–100)
HCT VFR BLD AUTO: 35.8 % (ref 35–47)
HDLC SERPL-MCNC: 46 MG/DL
HDLC SERPL: 3.3 (ref 0–5)
HGB BLD-MCNC: 11.4 G/DL (ref 11.5–16)
LDLC SERPL CALC-MCNC: 81.2 MG/DL (ref 0–100)
MCH RBC QN AUTO: 28.7 PG (ref 26–34)
MCHC RBC AUTO-ENTMCNC: 31.8 G/DL (ref 30–36.5)
MCV RBC AUTO: 90.2 FL (ref 80–99)
NRBC # BLD: 0 K/UL (ref 0–0.01)
NRBC BLD-RTO: 0 PER 100 WBC
PLATELET # BLD AUTO: 235 K/UL (ref 150–400)
PMV BLD AUTO: 12.3 FL (ref 8.9–12.9)
POTASSIUM SERPL-SCNC: 4.8 MMOL/L (ref 3.5–5.1)
PROT SERPL-MCNC: 6.8 G/DL (ref 6.4–8.2)
RBC # BLD AUTO: 3.97 M/UL (ref 3.8–5.2)
SODIUM SERPL-SCNC: 141 MMOL/L (ref 136–145)
TRIGL SERPL-MCNC: 114 MG/DL
VLDLC SERPL CALC-MCNC: 22.8 MG/DL
WBC # BLD AUTO: 10.7 K/UL (ref 3.6–11)

## 2024-09-05 RX ORDER — METOPROLOL TARTRATE 100 MG
100 TABLET ORAL 2 TIMES DAILY
Qty: 180 TABLET | Refills: 0 | Status: SHIPPED | OUTPATIENT
Start: 2024-09-05

## 2024-09-05 NOTE — TELEPHONE ENCOUNTER
Last appointment: 08/13/2024 MD Van   Next appointment: 09/10/2024 MD Van   Previous refill encounter(s):   08/18/2023 Lopressor #60 with 3 refills.     For Pharmacy Admin Tracking Only    Program: Medication Refill  Intervention Detail: New Rx: 1, reason: Patient Preference  Time Spent (min): 5  Requested Prescriptions     Pending Prescriptions Disp Refills    metoprolol (LOPRESSOR) 100 MG tablet [Pharmacy Med Name: METOPROLOL TARTRATE 100MG TABLETS] 180 tablet 0     Sig: TAKE 1 TABLET BY MOUTH TWICE DAILY

## 2024-09-10 ENCOUNTER — OFFICE VISIT (OUTPATIENT)
Facility: CLINIC | Age: 74
End: 2024-09-10
Payer: MEDICARE

## 2024-09-10 VITALS
SYSTOLIC BLOOD PRESSURE: 130 MMHG | DIASTOLIC BLOOD PRESSURE: 70 MMHG | WEIGHT: 184 LBS | TEMPERATURE: 98.4 F | RESPIRATION RATE: 20 BRPM | BODY MASS INDEX: 30.66 KG/M2 | OXYGEN SATURATION: 99 % | HEART RATE: 68 BPM | HEIGHT: 65 IN

## 2024-09-10 DIAGNOSIS — Z23 NEEDS FLU SHOT: ICD-10-CM

## 2024-09-10 DIAGNOSIS — E78.00 HYPERCHOLESTEREMIA: ICD-10-CM

## 2024-09-10 DIAGNOSIS — I10 ESSENTIAL (PRIMARY) HYPERTENSION: ICD-10-CM

## 2024-09-10 DIAGNOSIS — B37.2 CANDIDAL INTERTRIGO: Primary | ICD-10-CM

## 2024-09-10 DIAGNOSIS — E11.49 TYPE 2 DIABETES MELLITUS WITH OTHER DIABETIC NEUROLOGICAL COMPLICATION (HCC): ICD-10-CM

## 2024-09-10 PROCEDURE — G9899 SCRN MAM PERF RSLTS DOC: HCPCS | Performed by: INTERNAL MEDICINE

## 2024-09-10 PROCEDURE — 99214 OFFICE O/P EST MOD 30 MIN: CPT | Performed by: INTERNAL MEDICINE

## 2024-09-10 PROCEDURE — G8417 CALC BMI ABV UP PARAM F/U: HCPCS | Performed by: INTERNAL MEDICINE

## 2024-09-10 PROCEDURE — G8399 PT W/DXA RESULTS DOCUMENT: HCPCS | Performed by: INTERNAL MEDICINE

## 2024-09-10 PROCEDURE — 2022F DILAT RTA XM EVC RTNOPTHY: CPT | Performed by: INTERNAL MEDICINE

## 2024-09-10 PROCEDURE — 1036F TOBACCO NON-USER: CPT | Performed by: INTERNAL MEDICINE

## 2024-09-10 PROCEDURE — 90653 IIV ADJUVANT VACCINE IM: CPT | Performed by: INTERNAL MEDICINE

## 2024-09-10 PROCEDURE — 1123F ACP DISCUSS/DSCN MKR DOCD: CPT | Performed by: INTERNAL MEDICINE

## 2024-09-10 PROCEDURE — 3078F DIAST BP <80 MM HG: CPT | Performed by: INTERNAL MEDICINE

## 2024-09-10 PROCEDURE — 3046F HEMOGLOBIN A1C LEVEL >9.0%: CPT | Performed by: INTERNAL MEDICINE

## 2024-09-10 PROCEDURE — 3017F COLORECTAL CA SCREEN DOC REV: CPT | Performed by: INTERNAL MEDICINE

## 2024-09-10 PROCEDURE — G0008 ADMIN INFLUENZA VIRUS VAC: HCPCS | Performed by: INTERNAL MEDICINE

## 2024-09-10 PROCEDURE — 3075F SYST BP GE 130 - 139MM HG: CPT | Performed by: INTERNAL MEDICINE

## 2024-09-10 PROCEDURE — G8427 DOCREV CUR MEDS BY ELIG CLIN: HCPCS | Performed by: INTERNAL MEDICINE

## 2024-09-10 PROCEDURE — 1090F PRES/ABSN URINE INCON ASSESS: CPT | Performed by: INTERNAL MEDICINE

## 2024-09-10 RX ORDER — FERROUS SULFATE 325(65) MG
1 TABLET, DELAYED RELEASE (ENTERIC COATED) ORAL DAILY
Qty: 90 TABLET | Refills: 0 | Status: CANCELLED | OUTPATIENT
Start: 2024-09-10

## 2024-09-10 RX ORDER — FLUCONAZOLE 100 MG/1
100 TABLET ORAL DAILY
Qty: 14 TABLET | Refills: 0 | Status: SHIPPED | OUTPATIENT
Start: 2024-09-10 | End: 2024-09-24

## 2024-09-10 RX ORDER — FERROUS SULFATE 325(65) MG
1 TABLET, DELAYED RELEASE (ENTERIC COATED) ORAL DAILY
Qty: 90 TABLET | Refills: 3 | Status: SHIPPED | OUTPATIENT
Start: 2024-09-10

## 2024-09-10 SDOH — ECONOMIC STABILITY: FOOD INSECURITY: WITHIN THE PAST 12 MONTHS, YOU WORRIED THAT YOUR FOOD WOULD RUN OUT BEFORE YOU GOT MONEY TO BUY MORE.: NEVER TRUE

## 2024-09-10 SDOH — ECONOMIC STABILITY: FOOD INSECURITY: WITHIN THE PAST 12 MONTHS, THE FOOD YOU BOUGHT JUST DIDN'T LAST AND YOU DIDN'T HAVE MONEY TO GET MORE.: NEVER TRUE

## 2024-09-10 SDOH — ECONOMIC STABILITY: INCOME INSECURITY: HOW HARD IS IT FOR YOU TO PAY FOR THE VERY BASICS LIKE FOOD, HOUSING, MEDICAL CARE, AND HEATING?: SOMEWHAT HARD

## 2024-09-10 ASSESSMENT — PATIENT HEALTH QUESTIONNAIRE - PHQ9
SUM OF ALL RESPONSES TO PHQ9 QUESTIONS 1 & 2: 0
10. IF YOU CHECKED OFF ANY PROBLEMS, HOW DIFFICULT HAVE THESE PROBLEMS MADE IT FOR YOU TO DO YOUR WORK, TAKE CARE OF THINGS AT HOME, OR GET ALONG WITH OTHER PEOPLE: NOT DIFFICULT AT ALL
9. THOUGHTS THAT YOU WOULD BE BETTER OFF DEAD, OR OF HURTING YOURSELF: NOT AT ALL
SUM OF ALL RESPONSES TO PHQ QUESTIONS 1-9: 0
2. FEELING DOWN, DEPRESSED OR HOPELESS: NOT AT ALL
8. MOVING OR SPEAKING SO SLOWLY THAT OTHER PEOPLE COULD HAVE NOTICED. OR THE OPPOSITE, BEING SO FIGETY OR RESTLESS THAT YOU HAVE BEEN MOVING AROUND A LOT MORE THAN USUAL: NOT AT ALL
5. POOR APPETITE OR OVEREATING: NOT AT ALL
SUM OF ALL RESPONSES TO PHQ QUESTIONS 1-9: 0
1. LITTLE INTEREST OR PLEASURE IN DOING THINGS: NOT AT ALL
9. THOUGHTS THAT YOU WOULD BE BETTER OFF DEAD, OR OF HURTING YOURSELF: NOT AT ALL
SUM OF ALL RESPONSES TO PHQ QUESTIONS 1-9: 0
6. FEELING BAD ABOUT YOURSELF - OR THAT YOU ARE A FAILURE OR HAVE LET YOURSELF OR YOUR FAMILY DOWN: NOT AT ALL
6. FEELING BAD ABOUT YOURSELF - OR THAT YOU ARE A FAILURE OR HAVE LET YOURSELF OR YOUR FAMILY DOWN: NOT AT ALL
10. IF YOU CHECKED OFF ANY PROBLEMS, HOW DIFFICULT HAVE THESE PROBLEMS MADE IT FOR YOU TO DO YOUR WORK, TAKE CARE OF THINGS AT HOME, OR GET ALONG WITH OTHER PEOPLE: NOT DIFFICULT AT ALL
3. TROUBLE FALLING OR STAYING ASLEEP: NOT AT ALL
SUM OF ALL RESPONSES TO PHQ QUESTIONS 1-9: 0
5. POOR APPETITE OR OVEREATING: NOT AT ALL
SUM OF ALL RESPONSES TO PHQ QUESTIONS 1-9: 0
4. FEELING TIRED OR HAVING LITTLE ENERGY: NOT AT ALL
1. LITTLE INTEREST OR PLEASURE IN DOING THINGS: NOT AT ALL
SUM OF ALL RESPONSES TO PHQ QUESTIONS 1-9: 0
2. FEELING DOWN, DEPRESSED OR HOPELESS: NOT AT ALL
8. MOVING OR SPEAKING SO SLOWLY THAT OTHER PEOPLE COULD HAVE NOTICED. OR THE OPPOSITE, BEING SO FIGETY OR RESTLESS THAT YOU HAVE BEEN MOVING AROUND A LOT MORE THAN USUAL: NOT AT ALL
4. FEELING TIRED OR HAVING LITTLE ENERGY: NOT AT ALL
SUM OF ALL RESPONSES TO PHQ9 QUESTIONS 1 & 2: 0
3. TROUBLE FALLING OR STAYING ASLEEP: NOT AT ALL
7. TROUBLE CONCENTRATING ON THINGS, SUCH AS READING THE NEWSPAPER OR WATCHING TELEVISION: NOT AT ALL
7. TROUBLE CONCENTRATING ON THINGS, SUCH AS READING THE NEWSPAPER OR WATCHING TELEVISION: NOT AT ALL

## 2024-09-10 ASSESSMENT — ANXIETY QUESTIONNAIRES
7. FEELING AFRAID AS IF SOMETHING AWFUL MIGHT HAPPEN: NOT AT ALL
GAD7 TOTAL SCORE: 0
4. TROUBLE RELAXING: NOT AT ALL
2. NOT BEING ABLE TO STOP OR CONTROL WORRYING: NOT AT ALL
1. FEELING NERVOUS, ANXIOUS, OR ON EDGE: NOT AT ALL
3. WORRYING TOO MUCH ABOUT DIFFERENT THINGS: NOT AT ALL
IF YOU CHECKED OFF ANY PROBLEMS ON THIS QUESTIONNAIRE, HOW DIFFICULT HAVE THESE PROBLEMS MADE IT FOR YOU TO DO YOUR WORK, TAKE CARE OF THINGS AT HOME, OR GET ALONG WITH OTHER PEOPLE: NOT DIFFICULT AT ALL
6. BECOMING EASILY ANNOYED OR IRRITABLE: NOT AT ALL
5. BEING SO RESTLESS THAT IT IS HARD TO SIT STILL: NOT AT ALL

## 2024-11-05 RX ORDER — CLONIDINE HYDROCHLORIDE 0.1 MG/1
0.1 TABLET ORAL 2 TIMES DAILY
Qty: 180 TABLET | Refills: 0 | Status: SHIPPED | OUTPATIENT
Start: 2024-11-05

## 2024-12-03 RX ORDER — GLIPIZIDE 5 MG/1
5 TABLET, FILM COATED, EXTENDED RELEASE ORAL DAILY
Qty: 90 TABLET | Refills: 0 | Status: SHIPPED | OUTPATIENT
Start: 2024-12-03 | End: 2024-12-03 | Stop reason: SDUPTHER

## 2024-12-03 NOTE — TELEPHONE ENCOUNTER
Last appointment: 09/10/2024 MD Van   Next appointment: 12/10/2024 MD Van   Previous refill encounter(s):   11/20/2023 Glucotrol-XL #30 with 13 refills.     For Pharmacy Admin Tracking Only    Program: Medication Refill  Intervention Detail: New Rx: 1, reason: Patient Preference  Time Spent (min): 5    Requested Prescriptions     Pending Prescriptions Disp Refills    glipiZIDE (GLUCOTROL XL) 5 MG extended release tablet [Pharmacy Med Name: GLIPIZIDE ER 5MG TABLETS] 90 tablet 0     Sig: TAKE 1 TABLET BY MOUTH DAILY

## 2024-12-04 RX ORDER — METOPROLOL TARTRATE 100 MG/1
100 TABLET ORAL 2 TIMES DAILY
Qty: 180 TABLET | Refills: 3 | Status: SHIPPED | OUTPATIENT
Start: 2024-12-04

## 2024-12-04 RX ORDER — CLONIDINE HYDROCHLORIDE 0.1 MG/1
0.1 TABLET ORAL 2 TIMES DAILY
Qty: 180 TABLET | Refills: 3 | Status: SHIPPED | OUTPATIENT
Start: 2024-12-04

## 2024-12-04 RX ORDER — GLIPIZIDE 5 MG/1
5 TABLET, FILM COATED, EXTENDED RELEASE ORAL DAILY
Qty: 90 TABLET | Refills: 3 | Status: SHIPPED | OUTPATIENT
Start: 2024-12-04

## 2024-12-10 ENCOUNTER — OFFICE VISIT (OUTPATIENT)
Facility: CLINIC | Age: 74
End: 2024-12-10
Payer: MEDICARE

## 2024-12-10 ENCOUNTER — TELEPHONE (OUTPATIENT)
Facility: CLINIC | Age: 74
End: 2024-12-10

## 2024-12-10 VITALS
SYSTOLIC BLOOD PRESSURE: 138 MMHG | OXYGEN SATURATION: 97 % | HEIGHT: 65 IN | BODY MASS INDEX: 30.32 KG/M2 | HEART RATE: 72 BPM | WEIGHT: 182 LBS | TEMPERATURE: 97.9 F | DIASTOLIC BLOOD PRESSURE: 70 MMHG | RESPIRATION RATE: 16 BRPM

## 2024-12-10 DIAGNOSIS — I73.00 RAYNAUD'S DISEASE WITHOUT GANGRENE: Primary | ICD-10-CM

## 2024-12-10 DIAGNOSIS — I10 ESSENTIAL (PRIMARY) HYPERTENSION: ICD-10-CM

## 2024-12-10 DIAGNOSIS — E11.49 TYPE 2 DIABETES MELLITUS WITH OTHER DIABETIC NEUROLOGICAL COMPLICATION (HCC): ICD-10-CM

## 2024-12-10 DIAGNOSIS — K21.9 GASTROESOPHAGEAL REFLUX DISEASE, UNSPECIFIED WHETHER ESOPHAGITIS PRESENT: ICD-10-CM

## 2024-12-10 LAB — GLUCOSE, POC: 135 MG/DL

## 2024-12-10 PROCEDURE — 3075F SYST BP GE 130 - 139MM HG: CPT | Performed by: INTERNAL MEDICINE

## 2024-12-10 PROCEDURE — 1126F AMNT PAIN NOTED NONE PRSNT: CPT | Performed by: INTERNAL MEDICINE

## 2024-12-10 PROCEDURE — G9899 SCRN MAM PERF RSLTS DOC: HCPCS | Performed by: INTERNAL MEDICINE

## 2024-12-10 PROCEDURE — 2022F DILAT RTA XM EVC RTNOPTHY: CPT | Performed by: INTERNAL MEDICINE

## 2024-12-10 PROCEDURE — G8427 DOCREV CUR MEDS BY ELIG CLIN: HCPCS | Performed by: INTERNAL MEDICINE

## 2024-12-10 PROCEDURE — G8399 PT W/DXA RESULTS DOCUMENT: HCPCS | Performed by: INTERNAL MEDICINE

## 2024-12-10 PROCEDURE — G8417 CALC BMI ABV UP PARAM F/U: HCPCS | Performed by: INTERNAL MEDICINE

## 2024-12-10 PROCEDURE — 1036F TOBACCO NON-USER: CPT | Performed by: INTERNAL MEDICINE

## 2024-12-10 PROCEDURE — 1159F MED LIST DOCD IN RCRD: CPT | Performed by: INTERNAL MEDICINE

## 2024-12-10 PROCEDURE — 1123F ACP DISCUSS/DSCN MKR DOCD: CPT | Performed by: INTERNAL MEDICINE

## 2024-12-10 PROCEDURE — 99214 OFFICE O/P EST MOD 30 MIN: CPT | Performed by: INTERNAL MEDICINE

## 2024-12-10 PROCEDURE — 82962 GLUCOSE BLOOD TEST: CPT | Performed by: INTERNAL MEDICINE

## 2024-12-10 PROCEDURE — 3078F DIAST BP <80 MM HG: CPT | Performed by: INTERNAL MEDICINE

## 2024-12-10 PROCEDURE — 3017F COLORECTAL CA SCREEN DOC REV: CPT | Performed by: INTERNAL MEDICINE

## 2024-12-10 PROCEDURE — 1090F PRES/ABSN URINE INCON ASSESS: CPT | Performed by: INTERNAL MEDICINE

## 2024-12-10 PROCEDURE — G8482 FLU IMMUNIZE ORDER/ADMIN: HCPCS | Performed by: INTERNAL MEDICINE

## 2024-12-10 PROCEDURE — 3046F HEMOGLOBIN A1C LEVEL >9.0%: CPT | Performed by: INTERNAL MEDICINE

## 2024-12-10 RX ORDER — SERTRALINE HYDROCHLORIDE 25 MG/1
25 TABLET, FILM COATED ORAL DAILY
COMMUNITY
Start: 2024-11-25

## 2024-12-10 SDOH — ECONOMIC STABILITY: FOOD INSECURITY: WITHIN THE PAST 12 MONTHS, THE FOOD YOU BOUGHT JUST DIDN'T LAST AND YOU DIDN'T HAVE MONEY TO GET MORE.: NEVER TRUE

## 2024-12-10 SDOH — ECONOMIC STABILITY: INCOME INSECURITY: HOW HARD IS IT FOR YOU TO PAY FOR THE VERY BASICS LIKE FOOD, HOUSING, MEDICAL CARE, AND HEATING?: SOMEWHAT HARD

## 2024-12-10 SDOH — ECONOMIC STABILITY: FOOD INSECURITY: WITHIN THE PAST 12 MONTHS, YOU WORRIED THAT YOUR FOOD WOULD RUN OUT BEFORE YOU GOT MONEY TO BUY MORE.: NEVER TRUE

## 2024-12-10 ASSESSMENT — ANXIETY QUESTIONNAIRES
2. NOT BEING ABLE TO STOP OR CONTROL WORRYING: NOT AT ALL
4. TROUBLE RELAXING: NOT AT ALL
5. BEING SO RESTLESS THAT IT IS HARD TO SIT STILL: NOT AT ALL
7. FEELING AFRAID AS IF SOMETHING AWFUL MIGHT HAPPEN: NOT AT ALL
IF YOU CHECKED OFF ANY PROBLEMS ON THIS QUESTIONNAIRE, HOW DIFFICULT HAVE THESE PROBLEMS MADE IT FOR YOU TO DO YOUR WORK, TAKE CARE OF THINGS AT HOME, OR GET ALONG WITH OTHER PEOPLE: NOT DIFFICULT AT ALL
3. WORRYING TOO MUCH ABOUT DIFFERENT THINGS: NOT AT ALL
6. BECOMING EASILY ANNOYED OR IRRITABLE: NOT AT ALL
1. FEELING NERVOUS, ANXIOUS, OR ON EDGE: NOT AT ALL
GAD7 TOTAL SCORE: 0

## 2024-12-10 ASSESSMENT — PATIENT HEALTH QUESTIONNAIRE - PHQ9
2. FEELING DOWN, DEPRESSED OR HOPELESS: NOT AT ALL
10. IF YOU CHECKED OFF ANY PROBLEMS, HOW DIFFICULT HAVE THESE PROBLEMS MADE IT FOR YOU TO DO YOUR WORK, TAKE CARE OF THINGS AT HOME, OR GET ALONG WITH OTHER PEOPLE: NOT DIFFICULT AT ALL
3. TROUBLE FALLING OR STAYING ASLEEP: NOT AT ALL
SUM OF ALL RESPONSES TO PHQ QUESTIONS 1-9: 0
9. THOUGHTS THAT YOU WOULD BE BETTER OFF DEAD, OR OF HURTING YOURSELF: NOT AT ALL
8. MOVING OR SPEAKING SO SLOWLY THAT OTHER PEOPLE COULD HAVE NOTICED. OR THE OPPOSITE, BEING SO FIGETY OR RESTLESS THAT YOU HAVE BEEN MOVING AROUND A LOT MORE THAN USUAL: NOT AT ALL
4. FEELING TIRED OR HAVING LITTLE ENERGY: NOT AT ALL
SUM OF ALL RESPONSES TO PHQ9 QUESTIONS 1 & 2: 0
1. LITTLE INTEREST OR PLEASURE IN DOING THINGS: NOT AT ALL
SUM OF ALL RESPONSES TO PHQ QUESTIONS 1-9: 0
SUM OF ALL RESPONSES TO PHQ QUESTIONS 1-9: 0
5. POOR APPETITE OR OVEREATING: NOT AT ALL
SUM OF ALL RESPONSES TO PHQ QUESTIONS 1-9: 0
7. TROUBLE CONCENTRATING ON THINGS, SUCH AS READING THE NEWSPAPER OR WATCHING TELEVISION: NOT AT ALL
6. FEELING BAD ABOUT YOURSELF - OR THAT YOU ARE A FAILURE OR HAVE LET YOURSELF OR YOUR FAMILY DOWN: NOT AT ALL

## 2024-12-10 NOTE — TELEPHONE ENCOUNTER
Pt's son, Manolo Multani called and stated that Mrs. Fabian was told by Rehan to call the office and he would discuss with her son, how her visit went today.  Manolo can be reached @ [phone].

## 2024-12-10 NOTE — PROGRESS NOTES
Piedad Fabian is a 74 y.o. female and presents with Numbness (RIGHT HAND NUMBNESS AND COLD ), Follow-up (SHOULDER PAIN EMERGENCY ROOM FOLLOW UP), and Diabetes  .  Subjective:  She has recurrent pains and numbness that increase during cold weathet  She states she has to run hot water to improve of the rt.hand and finger tips       Hypertension Review:  The patient has hypertension .  Diet and Lifestyle: generally follows a low sodium diet, exercises sporadically  Home BP Monitoring: is not measured at home.  Pertinent ROS: taking medications as instructed, no medication side effects noted, no TIA's, no chest pain on exertion, no dyspnea on exertion, no swelling of ankles.    Dyslipidemia Review:  Patient presents for evaluation of lipids.  Compliance with treatment thus far has been excellent.  A repeat fasting lipid profile was not done.  The patient does not use medications that may worsen dyslipidemias . The patient exercises sporadically.      Diabetes Mellitus Review:  She has diabetes mellitus.  Diabetic ROS - medication compliance: compliant all of the time, diabetic diet compliance: compliant all of the time, home glucose monitoring: is performed.   Known diabetic complications: none  Cardiovascular risk factors: family history, dyslipidemia, diabetes mellitus, obesity, hypertension  Current diabetic medications include oral agents/  Eye exam current (within one year): no  Weight trend: stable  Prior visit with dietician: no  Current diet: \"healthy\" diet  in general  Current exercise: walking  Current monitoring regimen: home blood tests - daily  Home blood sugar records: trend: stable  Any episodes of hypoglycemia? no  Lab review: orders written for new lab studies as appropriate; see orders.         Review of Systems  Constitutional: negative for fevers, chills, anorexia and weight loss  Eyes:   negative for visual disturbance and irritation  ENT:   negative for tinnitus,sore throat,nasal congestion,ear

## 2024-12-10 NOTE — PROGRESS NOTES
\"Have you been to the ER, urgent care clinic since your last visit?  Hospitalized since your last visit?\"    YES    “Have you seen or consulted any other health care providers outside our system since your last visit?”    YES      “Have you had a diabetic eye exam?”    NO     No diabetic eye exam on file

## 2025-01-21 ENCOUNTER — OFFICE VISIT (OUTPATIENT)
Facility: CLINIC | Age: 75
End: 2025-01-21
Payer: MEDICARE

## 2025-01-21 VITALS
HEART RATE: 48 BPM | TEMPERATURE: 98 F | OXYGEN SATURATION: 98 % | RESPIRATION RATE: 19 BRPM | HEIGHT: 65 IN | BODY MASS INDEX: 29.32 KG/M2 | WEIGHT: 176 LBS | SYSTOLIC BLOOD PRESSURE: 130 MMHG | DIASTOLIC BLOOD PRESSURE: 80 MMHG

## 2025-01-21 DIAGNOSIS — E11.49 TYPE 2 DIABETES MELLITUS WITH OTHER DIABETIC NEUROLOGICAL COMPLICATION (HCC): ICD-10-CM

## 2025-01-21 DIAGNOSIS — I10 ESSENTIAL (PRIMARY) HYPERTENSION: ICD-10-CM

## 2025-01-21 DIAGNOSIS — K21.9 GASTROESOPHAGEAL REFLUX DISEASE, UNSPECIFIED WHETHER ESOPHAGITIS PRESENT: ICD-10-CM

## 2025-01-21 DIAGNOSIS — I73.00 RAYNAUD'S DISEASE WITHOUT GANGRENE: Primary | ICD-10-CM

## 2025-01-21 DIAGNOSIS — E78.00 HYPERCHOLESTEREMIA: ICD-10-CM

## 2025-01-21 LAB
ALBUMIN SERPL-MCNC: 3.8 G/DL (ref 3.5–5)
ALBUMIN/GLOB SERPL: 1.3 (ref 1.1–2.2)
ALP SERPL-CCNC: 75 U/L (ref 45–117)
ALT SERPL-CCNC: 12 U/L (ref 12–78)
ANION GAP SERPL CALC-SCNC: 6 MMOL/L (ref 2–12)
AST SERPL-CCNC: 10 U/L (ref 15–37)
BILIRUB SERPL-MCNC: 0.3 MG/DL (ref 0.2–1)
BUN SERPL-MCNC: 21 MG/DL (ref 6–20)
BUN/CREAT SERPL: 18 (ref 12–20)
CALCIUM SERPL-MCNC: 9.6 MG/DL (ref 8.5–10.1)
CHLORIDE SERPL-SCNC: 108 MMOL/L (ref 97–108)
CHOLEST SERPL-MCNC: 142 MG/DL
CO2 SERPL-SCNC: 28 MMOL/L (ref 21–32)
CREAT SERPL-MCNC: 1.18 MG/DL (ref 0.55–1.02)
ERYTHROCYTE [DISTWIDTH] IN BLOOD BY AUTOMATED COUNT: 13.1 % (ref 11.5–14.5)
GLOBULIN SER CALC-MCNC: 2.9 G/DL (ref 2–4)
GLUCOSE SERPL-MCNC: 134 MG/DL (ref 65–100)
HCT VFR BLD AUTO: 35.6 % (ref 35–47)
HDLC SERPL-MCNC: 46 MG/DL
HDLC SERPL: 3.1 (ref 0–5)
HGB BLD-MCNC: 11.4 G/DL (ref 11.5–16)
LDLC SERPL CALC-MCNC: 68.8 MG/DL (ref 0–100)
MCH RBC QN AUTO: 28.2 PG (ref 26–34)
MCHC RBC AUTO-ENTMCNC: 32 G/DL (ref 30–36.5)
MCV RBC AUTO: 88.1 FL (ref 80–99)
NRBC # BLD: 0 K/UL (ref 0–0.01)
NRBC BLD-RTO: 0 PER 100 WBC
PLATELET # BLD AUTO: 216 K/UL (ref 150–400)
PMV BLD AUTO: 12.6 FL (ref 8.9–12.9)
POTASSIUM SERPL-SCNC: 4.2 MMOL/L (ref 3.5–5.1)
PROT SERPL-MCNC: 6.7 G/DL (ref 6.4–8.2)
RBC # BLD AUTO: 4.04 M/UL (ref 3.8–5.2)
SODIUM SERPL-SCNC: 142 MMOL/L (ref 136–145)
TRIGL SERPL-MCNC: 136 MG/DL
VLDLC SERPL CALC-MCNC: 27.2 MG/DL
WBC # BLD AUTO: 9 K/UL (ref 3.6–11)

## 2025-01-21 PROCEDURE — G8427 DOCREV CUR MEDS BY ELIG CLIN: HCPCS | Performed by: INTERNAL MEDICINE

## 2025-01-21 PROCEDURE — 1090F PRES/ABSN URINE INCON ASSESS: CPT | Performed by: INTERNAL MEDICINE

## 2025-01-21 PROCEDURE — 3079F DIAST BP 80-89 MM HG: CPT | Performed by: INTERNAL MEDICINE

## 2025-01-21 PROCEDURE — G9899 SCRN MAM PERF RSLTS DOC: HCPCS | Performed by: INTERNAL MEDICINE

## 2025-01-21 PROCEDURE — 1036F TOBACCO NON-USER: CPT | Performed by: INTERNAL MEDICINE

## 2025-01-21 PROCEDURE — G8417 CALC BMI ABV UP PARAM F/U: HCPCS | Performed by: INTERNAL MEDICINE

## 2025-01-21 PROCEDURE — 3017F COLORECTAL CA SCREEN DOC REV: CPT | Performed by: INTERNAL MEDICINE

## 2025-01-21 PROCEDURE — 3075F SYST BP GE 130 - 139MM HG: CPT | Performed by: INTERNAL MEDICINE

## 2025-01-21 PROCEDURE — 99214 OFFICE O/P EST MOD 30 MIN: CPT | Performed by: INTERNAL MEDICINE

## 2025-01-21 PROCEDURE — 1126F AMNT PAIN NOTED NONE PRSNT: CPT | Performed by: INTERNAL MEDICINE

## 2025-01-21 PROCEDURE — 1123F ACP DISCUSS/DSCN MKR DOCD: CPT | Performed by: INTERNAL MEDICINE

## 2025-01-21 PROCEDURE — 2022F DILAT RTA XM EVC RTNOPTHY: CPT | Performed by: INTERNAL MEDICINE

## 2025-01-21 PROCEDURE — G8399 PT W/DXA RESULTS DOCUMENT: HCPCS | Performed by: INTERNAL MEDICINE

## 2025-01-21 PROCEDURE — 3046F HEMOGLOBIN A1C LEVEL >9.0%: CPT | Performed by: INTERNAL MEDICINE

## 2025-01-21 PROCEDURE — 1159F MED LIST DOCD IN RCRD: CPT | Performed by: INTERNAL MEDICINE

## 2025-01-21 RX ORDER — CLONIDINE HYDROCHLORIDE 0.1 MG/1
0.1 TABLET ORAL 2 TIMES DAILY
Qty: 180 TABLET | Refills: 3 | Status: SHIPPED | OUTPATIENT
Start: 2025-01-21

## 2025-01-21 RX ORDER — GLIPIZIDE 5 MG/1
5 TABLET, FILM COATED, EXTENDED RELEASE ORAL DAILY
Qty: 90 TABLET | Refills: 3 | Status: SHIPPED | OUTPATIENT
Start: 2025-01-21

## 2025-01-21 RX ORDER — LOSARTAN POTASSIUM 50 MG/1
50 TABLET ORAL DAILY
Qty: 90 TABLET | Refills: 3 | Status: SHIPPED | OUTPATIENT
Start: 2025-01-21

## 2025-01-21 RX ORDER — PRAVASTATIN SODIUM 40 MG
40 TABLET ORAL DAILY
Qty: 90 TABLET | Refills: 1 | Status: SHIPPED | OUTPATIENT
Start: 2025-01-21

## 2025-01-21 SDOH — ECONOMIC STABILITY: FOOD INSECURITY: WITHIN THE PAST 12 MONTHS, YOU WORRIED THAT YOUR FOOD WOULD RUN OUT BEFORE YOU GOT MONEY TO BUY MORE.: NEVER TRUE

## 2025-01-21 SDOH — ECONOMIC STABILITY: FOOD INSECURITY: WITHIN THE PAST 12 MONTHS, THE FOOD YOU BOUGHT JUST DIDN'T LAST AND YOU DIDN'T HAVE MONEY TO GET MORE.: NEVER TRUE

## 2025-01-21 ASSESSMENT — PATIENT HEALTH QUESTIONNAIRE - PHQ9

## 2025-01-21 ASSESSMENT — ANXIETY QUESTIONNAIRES
5. BEING SO RESTLESS THAT IT IS HARD TO SIT STILL: NOT AT ALL
7. FEELING AFRAID AS IF SOMETHING AWFUL MIGHT HAPPEN: NOT AT ALL
1. FEELING NERVOUS, ANXIOUS, OR ON EDGE: NOT AT ALL
2. NOT BEING ABLE TO STOP OR CONTROL WORRYING: NOT AT ALL
4. TROUBLE RELAXING: NOT AT ALL
3. WORRYING TOO MUCH ABOUT DIFFERENT THINGS: NOT AT ALL
IF YOU CHECKED OFF ANY PROBLEMS ON THIS QUESTIONNAIRE, HOW DIFFICULT HAVE THESE PROBLEMS MADE IT FOR YOU TO DO YOUR WORK, TAKE CARE OF THINGS AT HOME, OR GET ALONG WITH OTHER PEOPLE: NOT DIFFICULT AT ALL
GAD7 TOTAL SCORE: 0
6. BECOMING EASILY ANNOYED OR IRRITABLE: NOT AT ALL

## 2025-01-21 NOTE — PROGRESS NOTES
Piedad Fabian is a 74 y.o. female and presents with Hand Pain (Follow up)  .  Subjective:  She states she has had some improvements with her nitro ointment  She has [place]    Hypertension Review:  The patient has hypertension .  Diet and Lifestyle: generally follows a low sodium diet, exercises sporadically  Home BP Monitoring: is not measured at home.  Pertinent ROS: taking medications as instructed, no medication side effects noted, no TIA's, no chest pain on exertion, no dyspnea on exertion, no swelling of ankles.    Dyslipidemia Review:  Patient presents for evaluation of lipids.  Compliance with treatment thus far has been excellent.  A repeat fasting lipid profile was not done.  The patient does not use medications that may worsen dyslipidemias . The patient exercises sporadically.      Diabetes Mellitus Review:  She has diabetes mellitus.  Diabetic ROS - medication compliance: compliant all of the time, diabetic diet compliance: compliant all of the time, home glucose monitoring: is performed.   Known diabetic complications: none  Cardiovascular risk factors: family history, dyslipidemia, diabetes mellitus, obesity, hypertension  Current diabetic medications include oral agents/insulin   Eye exam current (within one year): no  Weight trend: stable  Prior visit with dietician: no  Current diet: \"healthy\" diet  in general  Current exercise: walking  Current monitoring regimen: home blood tests - daily  Home blood sugar records: trend: stable  Any episodes of hypoglycemia? no  Lab review: orders written for new lab studies as appropriate; see orders.           Review of Systems  Constitutional: negative for fevers, chills, anorexia and weight loss  Eyes:   negative for visual disturbance and irritation  ENT:   negative for tinnitus,sore throat,nasal congestion,ear pains.hoarseness  Respiratory:  negative for cough, hemoptysis, dyspnea,wheezing  CV:   negative for chest pain, palpitations, lower extremity

## 2025-01-21 NOTE — PROGRESS NOTES
\"Have you been to the ER, urgent care clinic since your last visit?  Hospitalized since your last visit?\"    no    “Have you seen or consulted any other health care providers outside our system since your last visit?”    no      “Have you had a diabetic eye exam?”    no     No diabetic eye exam on file

## 2025-01-29 NOTE — TELEPHONE ENCOUNTER
Duplicate request: 51/07/8273 Ferrous Sulfate 325 #90 was sent to [address] [place] [address] Road [id number].      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  The skin was cleansed with alcohol.  The skin was infiltrated with 1 mL of 1% lidocaine with 1-100,000 epinephrine.  After approximately 10 minutes, a Dermablade was used to perform the shave biopsy. The specimen was lifted off the defect and sent in formalin for permanent section pathology. Pressure was applied to the wound, followed by silver nitrate. The patient tolerated the procedure with minimal discomfort.      Ian Richardson MD  01/29/25  14:40 CST

## 2025-03-01 RX ORDER — HYDROCHLOROTHIAZIDE 25 MG/1
25 TABLET ORAL DAILY
Qty: 90 TABLET | Refills: 3 | Status: SHIPPED | OUTPATIENT
Start: 2025-03-01

## 2025-03-01 RX ORDER — SERTRALINE HYDROCHLORIDE 25 MG/1
25 TABLET, FILM COATED ORAL DAILY
Qty: 30 TABLET | Refills: 3 | Status: SHIPPED | OUTPATIENT
Start: 2025-03-01

## 2025-04-21 ENCOUNTER — OFFICE VISIT (OUTPATIENT)
Facility: CLINIC | Age: 75
End: 2025-04-21
Payer: MEDICARE

## 2025-04-21 VITALS
RESPIRATION RATE: 17 BRPM | HEIGHT: 65 IN | TEMPERATURE: 97.8 F | WEIGHT: 181 LBS | HEART RATE: 83 BPM | OXYGEN SATURATION: 99 % | BODY MASS INDEX: 30.16 KG/M2 | DIASTOLIC BLOOD PRESSURE: 70 MMHG | SYSTOLIC BLOOD PRESSURE: 130 MMHG

## 2025-04-21 DIAGNOSIS — Z00.00 MEDICARE ANNUAL WELLNESS VISIT, SUBSEQUENT: Primary | ICD-10-CM

## 2025-04-21 DIAGNOSIS — I73.00 RAYNAUD'S DISEASE WITHOUT GANGRENE: ICD-10-CM

## 2025-04-21 DIAGNOSIS — F33.1 MAJOR DEPRESSIVE DISORDER, RECURRENT, MODERATE (HCC): ICD-10-CM

## 2025-04-21 DIAGNOSIS — I10 ESSENTIAL (PRIMARY) HYPERTENSION: ICD-10-CM

## 2025-04-21 DIAGNOSIS — E78.00 HYPERCHOLESTEREMIA: ICD-10-CM

## 2025-04-21 DIAGNOSIS — E11.49 TYPE 2 DIABETES MELLITUS WITH OTHER DIABETIC NEUROLOGICAL COMPLICATION (HCC): ICD-10-CM

## 2025-04-21 LAB
ALBUMIN SERPL-MCNC: 3.8 G/DL (ref 3.5–5)
ALBUMIN/GLOB SERPL: 1.3 (ref 1.1–2.2)
ALP SERPL-CCNC: 63 U/L (ref 45–117)
ALT SERPL-CCNC: 18 U/L (ref 12–78)
ANION GAP SERPL CALC-SCNC: 4 MMOL/L (ref 2–12)
AST SERPL-CCNC: 18 U/L (ref 15–37)
BILIRUB SERPL-MCNC: 0.3 MG/DL (ref 0.2–1)
BUN SERPL-MCNC: 30 MG/DL (ref 6–20)
BUN/CREAT SERPL: 23 (ref 12–20)
CALCIUM SERPL-MCNC: 10 MG/DL (ref 8.5–10.1)
CHLORIDE SERPL-SCNC: 108 MMOL/L (ref 97–108)
CHOLESTEROL, POC: 131 MG/DL
CO2 SERPL-SCNC: 29 MMOL/L (ref 21–32)
CREAT SERPL-MCNC: 1.32 MG/DL (ref 0.55–1.02)
ERYTHROCYTE [DISTWIDTH] IN BLOOD BY AUTOMATED COUNT: 13.5 % (ref 11.5–14.5)
GLOBULIN SER CALC-MCNC: 3 G/DL (ref 2–4)
GLUCOSE SERPL-MCNC: 126 MG/DL (ref 65–100)
GLUCOSE, POC: 138 MG/DL
HBA1C MFR BLD: 6.5 %
HCT VFR BLD AUTO: 33 % (ref 35–47)
HDL CHOLESTEROL, POC: 41 MG/DL
HGB BLD-MCNC: 10.6 G/DL (ref 11.5–16)
LDL CHOLESTEROL, POC: 71 MG/DL
MCH RBC QN AUTO: 28.7 PG (ref 26–34)
MCHC RBC AUTO-ENTMCNC: 32.1 G/DL (ref 30–36.5)
MCV RBC AUTO: 89.4 FL (ref 80–99)
NON-HDL, POC: 90
NRBC # BLD: 0 K/UL (ref 0–0.01)
NRBC BLD-RTO: 0 PER 100 WBC
PLATELET # BLD AUTO: 201 K/UL (ref 150–400)
PMV BLD AUTO: 12 FL (ref 8.9–12.9)
POTASSIUM SERPL-SCNC: 4.6 MMOL/L (ref 3.5–5.1)
PROT SERPL-MCNC: 6.8 G/DL (ref 6.4–8.2)
RBC # BLD AUTO: 3.69 M/UL (ref 3.8–5.2)
SODIUM SERPL-SCNC: 141 MMOL/L (ref 136–145)
TCHOL/HDL RATIO, POC: 3.2
TRIGLYCERIDES, POC: 94 MG/DL
WBC # BLD AUTO: 7.5 K/UL (ref 3.6–11)

## 2025-04-21 PROCEDURE — 3017F COLORECTAL CA SCREEN DOC REV: CPT | Performed by: INTERNAL MEDICINE

## 2025-04-21 PROCEDURE — 1090F PRES/ABSN URINE INCON ASSESS: CPT | Performed by: INTERNAL MEDICINE

## 2025-04-21 PROCEDURE — 3078F DIAST BP <80 MM HG: CPT | Performed by: INTERNAL MEDICINE

## 2025-04-21 PROCEDURE — 82947 ASSAY GLUCOSE BLOOD QUANT: CPT | Performed by: INTERNAL MEDICINE

## 2025-04-21 PROCEDURE — 1123F ACP DISCUSS/DSCN MKR DOCD: CPT | Performed by: INTERNAL MEDICINE

## 2025-04-21 PROCEDURE — 80061 LIPID PANEL: CPT | Performed by: INTERNAL MEDICINE

## 2025-04-21 PROCEDURE — G8417 CALC BMI ABV UP PARAM F/U: HCPCS | Performed by: INTERNAL MEDICINE

## 2025-04-21 PROCEDURE — 1159F MED LIST DOCD IN RCRD: CPT | Performed by: INTERNAL MEDICINE

## 2025-04-21 PROCEDURE — G8427 DOCREV CUR MEDS BY ELIG CLIN: HCPCS | Performed by: INTERNAL MEDICINE

## 2025-04-21 PROCEDURE — 1036F TOBACCO NON-USER: CPT | Performed by: INTERNAL MEDICINE

## 2025-04-21 PROCEDURE — G8399 PT W/DXA RESULTS DOCUMENT: HCPCS | Performed by: INTERNAL MEDICINE

## 2025-04-21 PROCEDURE — G0439 PPPS, SUBSEQ VISIT: HCPCS | Performed by: INTERNAL MEDICINE

## 2025-04-21 PROCEDURE — 2022F DILAT RTA XM EVC RTNOPTHY: CPT | Performed by: INTERNAL MEDICINE

## 2025-04-21 PROCEDURE — 3075F SYST BP GE 130 - 139MM HG: CPT | Performed by: INTERNAL MEDICINE

## 2025-04-21 PROCEDURE — 3046F HEMOGLOBIN A1C LEVEL >9.0%: CPT | Performed by: INTERNAL MEDICINE

## 2025-04-21 PROCEDURE — 99214 OFFICE O/P EST MOD 30 MIN: CPT | Performed by: INTERNAL MEDICINE

## 2025-04-21 PROCEDURE — 83036 HEMOGLOBIN GLYCOSYLATED A1C: CPT | Performed by: INTERNAL MEDICINE

## 2025-04-21 ASSESSMENT — PATIENT HEALTH QUESTIONNAIRE - PHQ9
5. POOR APPETITE OR OVEREATING: NOT AT ALL
2. FEELING DOWN, DEPRESSED OR HOPELESS: NOT AT ALL
SUM OF ALL RESPONSES TO PHQ QUESTIONS 1-9: 0
6. FEELING BAD ABOUT YOURSELF - OR THAT YOU ARE A FAILURE OR HAVE LET YOURSELF OR YOUR FAMILY DOWN: NOT AT ALL
10. IF YOU CHECKED OFF ANY PROBLEMS, HOW DIFFICULT HAVE THESE PROBLEMS MADE IT FOR YOU TO DO YOUR WORK, TAKE CARE OF THINGS AT HOME, OR GET ALONG WITH OTHER PEOPLE: NOT DIFFICULT AT ALL
SUM OF ALL RESPONSES TO PHQ QUESTIONS 1-9: 0
8. MOVING OR SPEAKING SO SLOWLY THAT OTHER PEOPLE COULD HAVE NOTICED. OR THE OPPOSITE, BEING SO FIGETY OR RESTLESS THAT YOU HAVE BEEN MOVING AROUND A LOT MORE THAN USUAL: NOT AT ALL
1. LITTLE INTEREST OR PLEASURE IN DOING THINGS: NOT AT ALL
3. TROUBLE FALLING OR STAYING ASLEEP: NOT AT ALL
SUM OF ALL RESPONSES TO PHQ QUESTIONS 1-9: 0
9. THOUGHTS THAT YOU WOULD BE BETTER OFF DEAD, OR OF HURTING YOURSELF: NOT AT ALL
7. TROUBLE CONCENTRATING ON THINGS, SUCH AS READING THE NEWSPAPER OR WATCHING TELEVISION: NOT AT ALL
SUM OF ALL RESPONSES TO PHQ QUESTIONS 1-9: 0
4. FEELING TIRED OR HAVING LITTLE ENERGY: NOT AT ALL

## 2025-04-21 ASSESSMENT — LIFESTYLE VARIABLES
HOW OFTEN DO YOU HAVE A DRINK CONTAINING ALCOHOL: NEVER
HOW MANY STANDARD DRINKS CONTAINING ALCOHOL DO YOU HAVE ON A TYPICAL DAY: PATIENT DOES NOT DRINK

## 2025-04-21 NOTE — ADDENDUM NOTE
Addended by: J LUIS WERNER JR. on: 4/21/2025 10:49 AM     Modules accepted: Orders, Level of Service

## 2025-04-21 NOTE — PROGRESS NOTES
\"Have you been to the ER, urgent care clinic since your last visit?  Hospitalized since your last visit?\"    no  “Have you seen or consulted any other health care providers outside our system since your last visit?”    no      “Have you had a diabetic eye exam?”    no     No diabetic eye exam on file            
trend: stable  Any episodes of hypoglycemia? no  Lab review: orders written for new lab studies as appropriate; see orders.       Anemia  Patient presents for presents evaluation of anemia. Anemia was found in the past by routine CBC.  It has been present for several months.  Associated signs & symptoms: fatigue,there has been no recent episode of active bleeding reported      Patient's complete Health Risk Assessment and screening values have been reviewed and are found in Flowsheets. The following problems were reviewed today and where indicated follow up appointments were made and/or referrals ordered.    Positive Risk Factor Screenings with Interventions:              Inactivity:  On average, how many days per week do you engage in moderate to strenuous exercise (like a brisk walk)?: 0 days (!) Abnormal  On average, how many minutes do you engage in exercise at this level?: 0 min  Interventions:  Patient declined any further interventions or treatment     Abnormal BMI (obese):  Body mass index is 30.59 kg/m². (!) Abnormal  Interventions:  Patient advised to follow-up in this office for further evaluation and treatment             Advanced Directives:  Do you have a Living Will?: (!) No    Intervention:  has NO advanced directive - not interested in additional information                     Objective   Vitals:    04/21/25 1016   BP: 130/70   BP Site: Right Upper Arm   Patient Position: Sitting   BP Cuff Size: Large Adult   Pulse: 83   Resp: 17   Temp: 97.8 °F (36.6 °C)   TempSrc: Oral   SpO2: 99%   Weight: 82.1 kg (181 lb)   Height: 1.638 m (5' 4.5\")      Body mass index is 30.59 kg/m².                    Allergies   Allergen Reactions    Enalapril Swelling     Prior to Visit Medications    Medication Sig Taking? Authorizing Provider   sertraline (ZOLOFT) 25 MG tablet TAKE 1 TABLET BY MOUTH DAILY Yes Vipul Van Jr., MD   hydroCHLOROthiazide (HYDRODIURIL) 25 MG tablet TAKE 1 TABLET BY MOUTH DAILY Yes Rehan

## 2025-05-20 RX ORDER — FERROUS SULFATE 325(65) MG
1 TABLET, DELAYED RELEASE (ENTERIC COATED) ORAL DAILY
Qty: 90 TABLET | Refills: 3 | Status: SHIPPED | OUTPATIENT
Start: 2025-05-20

## 2025-08-04 RX ORDER — SERTRALINE HYDROCHLORIDE 25 MG/1
25 TABLET, FILM COATED ORAL DAILY
Qty: 30 TABLET | Refills: 3 | Status: SHIPPED | OUTPATIENT
Start: 2025-08-04

## 2025-08-04 RX ORDER — AMLODIPINE BESYLATE 5 MG/1
5 TABLET ORAL DAILY
Qty: 90 TABLET | Refills: 0 | Status: SHIPPED | OUTPATIENT
Start: 2025-08-04

## 2025-08-11 ENCOUNTER — OFFICE VISIT (OUTPATIENT)
Facility: CLINIC | Age: 75
End: 2025-08-11
Payer: MEDICARE

## 2025-08-11 VITALS
HEART RATE: 77 BPM | TEMPERATURE: 97.7 F | WEIGHT: 177 LBS | RESPIRATION RATE: 20 BRPM | HEIGHT: 65 IN | BODY MASS INDEX: 29.49 KG/M2 | OXYGEN SATURATION: 97 % | DIASTOLIC BLOOD PRESSURE: 60 MMHG | SYSTOLIC BLOOD PRESSURE: 108 MMHG

## 2025-08-11 DIAGNOSIS — R79.89 PRERENAL AZOTEMIA: ICD-10-CM

## 2025-08-11 DIAGNOSIS — I10 ESSENTIAL (PRIMARY) HYPERTENSION: Primary | ICD-10-CM

## 2025-08-11 DIAGNOSIS — K21.9 GASTROESOPHAGEAL REFLUX DISEASE, UNSPECIFIED WHETHER ESOPHAGITIS PRESENT: ICD-10-CM

## 2025-08-11 DIAGNOSIS — E11.49 TYPE 2 DIABETES MELLITUS WITH OTHER DIABETIC NEUROLOGICAL COMPLICATION (HCC): ICD-10-CM

## 2025-08-11 DIAGNOSIS — E78.00 HYPERCHOLESTEREMIA: ICD-10-CM

## 2025-08-11 DIAGNOSIS — D50.9 IRON DEFICIENCY ANEMIA, UNSPECIFIED IRON DEFICIENCY ANEMIA TYPE: ICD-10-CM

## 2025-08-11 LAB
ALBUMIN SERPL-MCNC: 3.8 G/DL (ref 3.5–5.2)
ALBUMIN/GLOB SERPL: 1.5 (ref 1.1–2.2)
ALP SERPL-CCNC: 65 U/L (ref 35–104)
ALT SERPL-CCNC: 12 U/L (ref 10–35)
ANION GAP SERPL CALC-SCNC: 11 MMOL/L (ref 2–14)
AST SERPL-CCNC: 19 U/L (ref 10–35)
BILIRUB SERPL-MCNC: 0.3 MG/DL (ref 0–1.2)
BUN SERPL-MCNC: 36 MG/DL (ref 8–23)
BUN/CREAT SERPL: 17 (ref 12–20)
CALCIUM SERPL-MCNC: 9.7 MG/DL (ref 8.8–10.2)
CHLORIDE SERPL-SCNC: 104 MMOL/L (ref 98–107)
CO2 SERPL-SCNC: 26 MMOL/L (ref 20–29)
CREAT SERPL-MCNC: 2.12 MG/DL (ref 0.6–1)
ERYTHROCYTE [DISTWIDTH] IN BLOOD BY AUTOMATED COUNT: 13.4 % (ref 11.5–14.5)
FERRITIN SERPL-MCNC: 217 NG/ML (ref 13–252)
GLOBULIN SER CALC-MCNC: 2.6 G/DL (ref 2–4)
GLUCOSE SERPL-MCNC: 137 MG/DL (ref 65–100)
GLUCOSE, POC: 164 MG/DL
HCT VFR BLD AUTO: 33.8 % (ref 35–47)
HGB BLD-MCNC: 10.4 G/DL (ref 11.5–16)
IRON SATN MFR SERPL: 22 %
IRON SERPL-MCNC: 65 UG/DL (ref 37–145)
MCH RBC QN AUTO: 28.3 PG (ref 26–34)
MCHC RBC AUTO-ENTMCNC: 30.8 G/DL (ref 30–36.5)
MCV RBC AUTO: 92.1 FL (ref 80–99)
NRBC # BLD: 0 K/UL (ref 0–0.01)
NRBC BLD-RTO: 0 PER 100 WBC
PLATELET # BLD AUTO: 203 K/UL (ref 150–400)
PMV BLD AUTO: 12.4 FL (ref 8.9–12.9)
POTASSIUM SERPL-SCNC: 5.1 MMOL/L (ref 3.5–5.1)
PROT SERPL-MCNC: 6.4 G/DL (ref 6.4–8.3)
RBC # BLD AUTO: 3.67 M/UL (ref 3.8–5.2)
SODIUM SERPL-SCNC: 142 MMOL/L (ref 136–145)
TIBC SERPL-MCNC: 293 UG/DL (ref 250–450)
UIBC SERPL-MCNC: 228 UG/DL (ref 112–347)
WBC # BLD AUTO: 7.4 K/UL (ref 3.6–11)

## 2025-08-11 PROCEDURE — 3046F HEMOGLOBIN A1C LEVEL >9.0%: CPT | Performed by: INTERNAL MEDICINE

## 2025-08-11 PROCEDURE — 1090F PRES/ABSN URINE INCON ASSESS: CPT | Performed by: INTERNAL MEDICINE

## 2025-08-11 PROCEDURE — 82962 GLUCOSE BLOOD TEST: CPT | Performed by: INTERNAL MEDICINE

## 2025-08-11 PROCEDURE — 1123F ACP DISCUSS/DSCN MKR DOCD: CPT | Performed by: INTERNAL MEDICINE

## 2025-08-11 PROCEDURE — 3078F DIAST BP <80 MM HG: CPT | Performed by: INTERNAL MEDICINE

## 2025-08-11 PROCEDURE — 1159F MED LIST DOCD IN RCRD: CPT | Performed by: INTERNAL MEDICINE

## 2025-08-11 PROCEDURE — 3017F COLORECTAL CA SCREEN DOC REV: CPT | Performed by: INTERNAL MEDICINE

## 2025-08-11 PROCEDURE — G8399 PT W/DXA RESULTS DOCUMENT: HCPCS | Performed by: INTERNAL MEDICINE

## 2025-08-11 PROCEDURE — G8427 DOCREV CUR MEDS BY ELIG CLIN: HCPCS | Performed by: INTERNAL MEDICINE

## 2025-08-11 PROCEDURE — G8417 CALC BMI ABV UP PARAM F/U: HCPCS | Performed by: INTERNAL MEDICINE

## 2025-08-11 PROCEDURE — 3074F SYST BP LT 130 MM HG: CPT | Performed by: INTERNAL MEDICINE

## 2025-08-11 PROCEDURE — 99214 OFFICE O/P EST MOD 30 MIN: CPT | Performed by: INTERNAL MEDICINE

## 2025-08-11 PROCEDURE — 3044F HG A1C LEVEL LT 7.0%: CPT | Performed by: INTERNAL MEDICINE

## 2025-08-11 PROCEDURE — 1126F AMNT PAIN NOTED NONE PRSNT: CPT | Performed by: INTERNAL MEDICINE

## 2025-08-11 PROCEDURE — 2022F DILAT RTA XM EVC RTNOPTHY: CPT | Performed by: INTERNAL MEDICINE

## 2025-08-11 PROCEDURE — 1036F TOBACCO NON-USER: CPT | Performed by: INTERNAL MEDICINE

## 2025-08-11 RX ORDER — PRAVASTATIN SODIUM 40 MG
40 TABLET ORAL DAILY
Qty: 90 TABLET | Refills: 3 | Status: SHIPPED | OUTPATIENT
Start: 2025-08-11

## 2025-08-11 RX ORDER — AMLODIPINE BESYLATE 5 MG/1
5 TABLET ORAL DAILY
Qty: 90 TABLET | Refills: 3 | Status: SHIPPED | OUTPATIENT
Start: 2025-08-11

## 2025-08-11 RX ORDER — HYDROCHLOROTHIAZIDE 25 MG/1
12.5 TABLET ORAL DAILY
Qty: 90 TABLET | Refills: 3 | Status: SHIPPED
Start: 2025-08-11

## 2025-08-11 RX ORDER — MELOXICAM 15 MG/1
15 TABLET ORAL DAILY
Qty: 90 TABLET | Refills: 3 | Status: SHIPPED | OUTPATIENT
Start: 2025-08-11

## (undated) DEVICE — SUTURE MCRYL SZ 5-0 L18IN ABSRB UD L13MM P-3 3/8 CIR PRIM Y493G

## (undated) DEVICE — SOLUTION IRRIG 1000ML STRL H2O USP PLAS POUR BTL

## (undated) DEVICE — SOLUTION IRRIG 1000ML 0.9% SOD CHL USP POUR PLAS BTL

## (undated) DEVICE — BASIC SINGLE BASIN BTC-LF: Brand: MEDLINE INDUSTRIES, INC.

## (undated) DEVICE — MASTISOL ADHESIVE LIQ 2/3ML

## (undated) DEVICE — ELECTRO LUBE IS A SINGLE PATIENT USE DEVICE THAT IS INTENDED TO BE USED ON ELECTROSURGICAL ELECTRODES TO REDUCE STICKING.: Brand: KEY SURGICAL ELECTRO LUBE

## (undated) DEVICE — SUTURE VCRL SZ 3-0 L18IN ABSRB UD L26MM SH 1/2 CIR J864D

## (undated) DEVICE — SPONGE: SPECIALTY PEANUT XR 100/CS: Brand: MEDICAL ACTION INDUSTRIES

## (undated) DEVICE — SUTURE PERMAHAND SZ 2-0 L18IN NONABSORBABLE BLK L26MM PS 1588H

## (undated) DEVICE — ELECTRODE PT RET AD L9FT HI MOIST COND ADH HYDRGEL CORDED

## (undated) DEVICE — SUTURE NONABSORBABLE MONOFILAMENT 3-0 PS-1 18 IN BLK ETHILON 1663H

## (undated) DEVICE — PENCIL SMK EVAC ALL IN 1 DSGN ENH VISIBILITY IMPROVED AIR

## (undated) DEVICE — PREMIUM WET SKIN PREP TRAY: Brand: MEDLINE INDUSTRIES, INC.

## (undated) DEVICE — SUTURE COAT VCRL SZ 4-0 L18IN ABSRB UD L19MM PS-2 1/2 CIR J496G

## (undated) DEVICE — STRIP,CLOSURE,WOUND,MEDI-STRIP,1/2X4: Brand: MEDLINE

## (undated) DEVICE — BLADE,CARBON-STEEL,15,STRL,DISPOSABLE,TB: Brand: MEDLINE

## (undated) DEVICE — GLOVE SURG SZ 75 CRM LTX FREE POLYISOPRENE POLYMER BEAD ANTI

## (undated) DEVICE — SUTURE VCRL SZ 3-0 L27IN ABSRB UD L26MM SH 1/2 CIR J416H

## (undated) DEVICE — GOWN,AURORA,NON-REINFORCED,2XL: Brand: MEDLINE

## (undated) DEVICE — GARMENT,MEDLINE,DVT,INT,CALF,MED, GEN2: Brand: MEDLINE

## (undated) DEVICE — BLADE ES ELASTOMERIC COAT INSUL DURABLE BEND UPTO 90DEG

## (undated) DEVICE — CORD ES L12FT BPLR FRCP

## (undated) DEVICE — ENT-SMH: Brand: MEDLINE INDUSTRIES, INC.